# Patient Record
Sex: FEMALE | Race: BLACK OR AFRICAN AMERICAN | NOT HISPANIC OR LATINO | URBAN - METROPOLITAN AREA
[De-identification: names, ages, dates, MRNs, and addresses within clinical notes are randomized per-mention and may not be internally consistent; named-entity substitution may affect disease eponyms.]

---

## 2023-10-06 ENCOUNTER — INPATIENT (INPATIENT)
Facility: HOSPITAL | Age: 53
LOS: 11 days | Discharge: HOME | End: 2023-10-18
Attending: INTERNAL MEDICINE | Admitting: INTERNAL MEDICINE
Payer: MEDICAID

## 2023-10-06 VITALS
OXYGEN SATURATION: 69 % | DIASTOLIC BLOOD PRESSURE: 90 MMHG | HEIGHT: 59.84 IN | TEMPERATURE: 99 F | WEIGHT: 108.91 LBS | RESPIRATION RATE: 28 BRPM | SYSTOLIC BLOOD PRESSURE: 146 MMHG | HEART RATE: 127 BPM

## 2023-10-06 DIAGNOSIS — R79.89 OTHER SPECIFIED ABNORMAL FINDINGS OF BLOOD CHEMISTRY: ICD-10-CM

## 2023-10-06 DIAGNOSIS — J96.01 ACUTE RESPIRATORY FAILURE WITH HYPOXIA: ICD-10-CM

## 2023-10-06 DIAGNOSIS — J18.9 PNEUMONIA, UNSPECIFIED ORGANISM: ICD-10-CM

## 2023-10-06 DIAGNOSIS — E87.6 HYPOKALEMIA: ICD-10-CM

## 2023-10-06 LAB
ALBUMIN SERPL ELPH-MCNC: 3 G/DL — LOW (ref 3.3–5)
ALBUMIN SERPL ELPH-MCNC: 3.6 G/DL — SIGNIFICANT CHANGE UP (ref 3.3–5)
ALP SERPL-CCNC: 83 U/L — SIGNIFICANT CHANGE UP (ref 40–120)
ALP SERPL-CCNC: 97 U/L — SIGNIFICANT CHANGE UP (ref 40–120)
ALT FLD-CCNC: 47 U/L — SIGNIFICANT CHANGE UP (ref 12–78)
ALT FLD-CCNC: 47 U/L — SIGNIFICANT CHANGE UP (ref 12–78)
ANION GAP SERPL CALC-SCNC: 7 MMOL/L — SIGNIFICANT CHANGE UP (ref 5–17)
ANION GAP SERPL CALC-SCNC: 7 MMOL/L — SIGNIFICANT CHANGE UP (ref 5–17)
APPEARANCE UR: CLEAR — SIGNIFICANT CHANGE UP
APTT BLD: 30.6 SEC — SIGNIFICANT CHANGE UP (ref 24.5–35.6)
AST SERPL-CCNC: 36 U/L — SIGNIFICANT CHANGE UP (ref 15–37)
AST SERPL-CCNC: 43 U/L — HIGH (ref 15–37)
BACTERIA # UR AUTO: ABNORMAL
BASE EXCESS BLDA CALC-SCNC: -1.4 MMOL/L — SIGNIFICANT CHANGE UP (ref -2–3)
BASE EXCESS BLDA CALC-SCNC: 3.2 MMOL/L — HIGH (ref -2–3)
BASOPHILS # BLD AUTO: 0.04 K/UL — SIGNIFICANT CHANGE UP (ref 0–0.2)
BASOPHILS NFR BLD AUTO: 0.2 % — SIGNIFICANT CHANGE UP (ref 0–2)
BILIRUB SERPL-MCNC: 1.3 MG/DL — HIGH (ref 0.2–1.2)
BILIRUB SERPL-MCNC: 1.8 MG/DL — HIGH (ref 0.2–1.2)
BILIRUB UR-MCNC: NEGATIVE — SIGNIFICANT CHANGE UP
BLD GP AB SCN SERPL QL: SIGNIFICANT CHANGE UP
BLOOD GAS COMMENTS ARTERIAL: SIGNIFICANT CHANGE UP
BLOOD GAS COMMENTS ARTERIAL: SIGNIFICANT CHANGE UP
BUN SERPL-MCNC: 6 MG/DL — LOW (ref 7–23)
BUN SERPL-MCNC: 9 MG/DL — SIGNIFICANT CHANGE UP (ref 7–23)
CALCIUM SERPL-MCNC: 8.7 MG/DL — SIGNIFICANT CHANGE UP (ref 8.5–10.1)
CALCIUM SERPL-MCNC: 8.9 MG/DL — SIGNIFICANT CHANGE UP (ref 8.5–10.1)
CHLORIDE SERPL-SCNC: 103 MMOL/L — SIGNIFICANT CHANGE UP (ref 96–108)
CHLORIDE SERPL-SCNC: 107 MMOL/L — SIGNIFICANT CHANGE UP (ref 96–108)
CK MB BLD-MCNC: 2.1 % — SIGNIFICANT CHANGE UP (ref 0–3.5)
CK MB CFR SERPL CALC: 3.8 NG/ML — HIGH (ref 0.5–3.6)
CK SERPL-CCNC: 177 U/L — SIGNIFICANT CHANGE UP (ref 26–192)
CO2 BLDA-SCNC: 28 MMOL/L — HIGH (ref 19–24)
CO2 BLDA-SCNC: 29 MMOL/L — HIGH (ref 19–24)
CO2 SERPL-SCNC: 24 MMOL/L — SIGNIFICANT CHANGE UP (ref 22–31)
CO2 SERPL-SCNC: 28 MMOL/L — SIGNIFICANT CHANGE UP (ref 22–31)
COLOR SPEC: YELLOW — SIGNIFICANT CHANGE UP
CREAT SERPL-MCNC: 0.69 MG/DL — SIGNIFICANT CHANGE UP (ref 0.5–1.3)
CREAT SERPL-MCNC: 0.85 MG/DL — SIGNIFICANT CHANGE UP (ref 0.5–1.3)
D DIMER BLD IA.RAPID-MCNC: 282 NG/ML DDU — HIGH
DIFF PNL FLD: ABNORMAL
EGFR: 103 ML/MIN/1.73M2 — SIGNIFICANT CHANGE UP
EGFR: 81 ML/MIN/1.73M2 — SIGNIFICANT CHANGE UP
EOSINOPHIL # BLD AUTO: 0 K/UL — SIGNIFICANT CHANGE UP (ref 0–0.5)
EOSINOPHIL NFR BLD AUTO: 0 % — SIGNIFICANT CHANGE UP (ref 0–6)
EPI CELLS # UR: SIGNIFICANT CHANGE UP
GAS PNL BLDA: SIGNIFICANT CHANGE UP
GLUCOSE BLDC GLUCOMTR-MCNC: 179 MG/DL — HIGH (ref 70–99)
GLUCOSE SERPL-MCNC: 144 MG/DL — HIGH (ref 70–99)
GLUCOSE SERPL-MCNC: 170 MG/DL — HIGH (ref 70–99)
GLUCOSE UR QL: 250 MG/DL
HCO3 BLDA-SCNC: 27 MMOL/L — SIGNIFICANT CHANGE UP (ref 21–28)
HCO3 BLDA-SCNC: 28 MMOL/L — SIGNIFICANT CHANGE UP (ref 21–28)
HCT VFR BLD CALC: 36.3 % — SIGNIFICANT CHANGE UP (ref 34.5–45)
HGB BLD-MCNC: 11.5 G/DL — SIGNIFICANT CHANGE UP (ref 11.5–15.5)
HOROWITZ INDEX BLDA+IHG-RTO: 100 — SIGNIFICANT CHANGE UP
HOROWITZ INDEX BLDA+IHG-RTO: 100 — SIGNIFICANT CHANGE UP
IMM GRANULOCYTES NFR BLD AUTO: 0.4 % — SIGNIFICANT CHANGE UP (ref 0–0.9)
INR BLD: 1.08 RATIO — SIGNIFICANT CHANGE UP (ref 0.85–1.18)
KETONES UR-MCNC: NEGATIVE — SIGNIFICANT CHANGE UP
LACTATE SERPL-SCNC: 3 MMOL/L — HIGH (ref 0.7–2)
LACTATE SERPL-SCNC: 5.9 MMOL/L — CRITICAL HIGH (ref 0.7–2)
LACTATE SERPL-SCNC: 6.8 MMOL/L — CRITICAL HIGH (ref 0.7–2)
LEGIONELLA AG UR QL: NEGATIVE — SIGNIFICANT CHANGE UP
LEUKOCYTE ESTERASE UR-ACNC: NEGATIVE — SIGNIFICANT CHANGE UP
LYMPHOCYTES # BLD AUTO: 1.5 K/UL — SIGNIFICANT CHANGE UP (ref 1–3.3)
LYMPHOCYTES # BLD AUTO: 8.9 % — LOW (ref 13–44)
MAGNESIUM SERPL-MCNC: 1.7 MG/DL — SIGNIFICANT CHANGE UP (ref 1.6–2.6)
MCHC RBC-ENTMCNC: 27.8 PG — SIGNIFICANT CHANGE UP (ref 27–34)
MCHC RBC-ENTMCNC: 31.7 G/DL — LOW (ref 32–36)
MCV RBC AUTO: 87.9 FL — SIGNIFICANT CHANGE UP (ref 80–100)
MONOCYTES # BLD AUTO: 0.49 K/UL — SIGNIFICANT CHANGE UP (ref 0–0.9)
MONOCYTES NFR BLD AUTO: 2.9 % — SIGNIFICANT CHANGE UP (ref 2–14)
NEUTROPHILS # BLD AUTO: 14.72 K/UL — HIGH (ref 1.8–7.4)
NEUTROPHILS NFR BLD AUTO: 87.6 % — HIGH (ref 43–77)
NITRITE UR-MCNC: NEGATIVE — SIGNIFICANT CHANGE UP
NRBC # BLD: 0 /100 WBCS — SIGNIFICANT CHANGE UP (ref 0–0)
PCO2 BLDA: 42 MMHG — SIGNIFICANT CHANGE UP (ref 32–46)
PCO2 BLDA: 58 MMHG — HIGH (ref 32–46)
PH BLDA: 7.27 — LOW (ref 7.35–7.45)
PH BLDA: 7.43 — SIGNIFICANT CHANGE UP (ref 7.35–7.45)
PH UR: 6 — SIGNIFICANT CHANGE UP (ref 5–8)
PHOSPHATE SERPL-MCNC: 3.2 MG/DL — SIGNIFICANT CHANGE UP (ref 2.5–4.5)
PLATELET # BLD AUTO: 218 K/UL — SIGNIFICANT CHANGE UP (ref 150–400)
PO2 BLDA: 76 MMHG — LOW (ref 83–108)
PO2 BLDA: 85 MMHG — SIGNIFICANT CHANGE UP (ref 83–108)
POTASSIUM SERPL-MCNC: 3.4 MMOL/L — LOW (ref 3.5–5.3)
POTASSIUM SERPL-MCNC: 3.5 MMOL/L — SIGNIFICANT CHANGE UP (ref 3.5–5.3)
POTASSIUM SERPL-SCNC: 3.4 MMOL/L — LOW (ref 3.5–5.3)
POTASSIUM SERPL-SCNC: 3.5 MMOL/L — SIGNIFICANT CHANGE UP (ref 3.5–5.3)
PROT SERPL-MCNC: 7.2 GM/DL — SIGNIFICANT CHANGE UP (ref 6–8.3)
PROT SERPL-MCNC: 7.9 GM/DL — SIGNIFICANT CHANGE UP (ref 6–8.3)
PROT UR-MCNC: 15 MG/DL
PROTHROM AB SERPL-ACNC: 12.9 SEC — SIGNIFICANT CHANGE UP (ref 9.5–13)
RAPID RVP RESULT: SIGNIFICANT CHANGE UP
RBC # BLD: 4.13 M/UL — SIGNIFICANT CHANGE UP (ref 3.8–5.2)
RBC # FLD: 13.5 % — SIGNIFICANT CHANGE UP (ref 10.3–14.5)
RBC CASTS # UR COMP ASSIST: SIGNIFICANT CHANGE UP /HPF (ref 0–4)
S PNEUM AG UR QL: NEGATIVE — SIGNIFICANT CHANGE UP
SAO2 % BLDA: 95.3 % — SIGNIFICANT CHANGE UP (ref 94–98)
SAO2 % BLDA: 97.8 % — SIGNIFICANT CHANGE UP (ref 94–98)
SARS-COV-2 RNA SPEC QL NAA+PROBE: SIGNIFICANT CHANGE UP
SODIUM SERPL-SCNC: 138 MMOL/L — SIGNIFICANT CHANGE UP (ref 135–145)
SODIUM SERPL-SCNC: 138 MMOL/L — SIGNIFICANT CHANGE UP (ref 135–145)
SP GR SPEC: 1.01 — SIGNIFICANT CHANGE UP (ref 1.01–1.02)
TROPONIN I, HIGH SENSITIVITY RESULT: 107.4 NG/L — HIGH
TROPONIN I, HIGH SENSITIVITY RESULT: 278.6 NG/L — HIGH
UROBILINOGEN FLD QL: NEGATIVE MG/DL — SIGNIFICANT CHANGE UP
WBC # BLD: 16.81 K/UL — HIGH (ref 3.8–10.5)
WBC # FLD AUTO: 16.81 K/UL — HIGH (ref 3.8–10.5)
WBC UR QL: SIGNIFICANT CHANGE UP

## 2023-10-06 PROCEDURE — 99223 1ST HOSP IP/OBS HIGH 75: CPT

## 2023-10-06 PROCEDURE — 31500 INSERT EMERGENCY AIRWAY: CPT

## 2023-10-06 PROCEDURE — 99053 MED SERV 10PM-8AM 24 HR FAC: CPT

## 2023-10-06 PROCEDURE — 71045 X-RAY EXAM CHEST 1 VIEW: CPT | Mod: 26,77

## 2023-10-06 PROCEDURE — 99291 CRITICAL CARE FIRST HOUR: CPT

## 2023-10-06 PROCEDURE — 71045 X-RAY EXAM CHEST 1 VIEW: CPT | Mod: 26

## 2023-10-06 PROCEDURE — 43752 NASAL/OROGASTRIC W/TUBE PLMT: CPT

## 2023-10-06 PROCEDURE — 71275 CT ANGIOGRAPHY CHEST: CPT | Mod: 26,MA

## 2023-10-06 PROCEDURE — 99291 CRITICAL CARE FIRST HOUR: CPT | Mod: 25

## 2023-10-06 RX ORDER — MAGNESIUM SULFATE 500 MG/ML
2 VIAL (ML) INJECTION ONCE
Refills: 0 | Status: COMPLETED | OUTPATIENT
Start: 2023-10-06 | End: 2023-10-06

## 2023-10-06 RX ORDER — NOREPINEPHRINE BITARTRATE/D5W 8 MG/250ML
0.05 PLASTIC BAG, INJECTION (ML) INTRAVENOUS
Qty: 8 | Refills: 0 | Status: DISCONTINUED | OUTPATIENT
Start: 2023-10-06 | End: 2023-10-09

## 2023-10-06 RX ORDER — MIDAZOLAM HYDROCHLORIDE 1 MG/ML
4 INJECTION, SOLUTION INTRAMUSCULAR; INTRAVENOUS ONCE
Refills: 0 | Status: DISCONTINUED | OUTPATIENT
Start: 2023-10-06 | End: 2023-10-06

## 2023-10-06 RX ORDER — PANTOPRAZOLE SODIUM 20 MG/1
40 TABLET, DELAYED RELEASE ORAL DAILY
Refills: 0 | Status: DISCONTINUED | OUTPATIENT
Start: 2023-10-06 | End: 2023-10-10

## 2023-10-06 RX ORDER — IPRATROPIUM/ALBUTEROL SULFATE 18-103MCG
3 AEROSOL WITH ADAPTER (GRAM) INHALATION
Refills: 0 | Status: COMPLETED | OUTPATIENT
Start: 2023-10-06 | End: 2023-10-06

## 2023-10-06 RX ORDER — ONDANSETRON 8 MG/1
4 TABLET, FILM COATED ORAL EVERY 8 HOURS
Refills: 0 | Status: DISCONTINUED | OUTPATIENT
Start: 2023-10-06 | End: 2023-10-18

## 2023-10-06 RX ORDER — CISATRACURIUM BESYLATE 2 MG/ML
3 INJECTION INTRAVENOUS
Qty: 200 | Refills: 0 | Status: DISCONTINUED | OUTPATIENT
Start: 2023-10-06 | End: 2023-10-08

## 2023-10-06 RX ORDER — CEFTRIAXONE 500 MG/1
1000 INJECTION, POWDER, FOR SOLUTION INTRAMUSCULAR; INTRAVENOUS ONCE
Refills: 0 | Status: COMPLETED | OUTPATIENT
Start: 2023-10-06 | End: 2023-10-06

## 2023-10-06 RX ORDER — SODIUM CHLORIDE 9 MG/ML
1000 INJECTION, SOLUTION INTRAVENOUS
Refills: 0 | Status: COMPLETED | OUTPATIENT
Start: 2023-10-06 | End: 2023-10-06

## 2023-10-06 RX ORDER — IPRATROPIUM/ALBUTEROL SULFATE 18-103MCG
3 AEROSOL WITH ADAPTER (GRAM) INHALATION ONCE
Refills: 0 | Status: COMPLETED | OUTPATIENT
Start: 2023-10-06 | End: 2023-10-06

## 2023-10-06 RX ORDER — SODIUM CHLORIDE 9 MG/ML
1550 INJECTION INTRAMUSCULAR; INTRAVENOUS; SUBCUTANEOUS ONCE
Refills: 0 | Status: COMPLETED | OUTPATIENT
Start: 2023-10-06 | End: 2023-10-06

## 2023-10-06 RX ORDER — CHLORHEXIDINE GLUCONATE 213 G/1000ML
1 SOLUTION TOPICAL
Refills: 0 | Status: DISCONTINUED | OUTPATIENT
Start: 2023-10-06 | End: 2023-10-18

## 2023-10-06 RX ORDER — DEXAMETHASONE 0.5 MG/5ML
10 ELIXIR ORAL DAILY
Refills: 0 | Status: DISCONTINUED | OUTPATIENT
Start: 2023-10-06 | End: 2023-10-09

## 2023-10-06 RX ORDER — IPRATROPIUM/ALBUTEROL SULFATE 18-103MCG
3 AEROSOL WITH ADAPTER (GRAM) INHALATION EVERY 6 HOURS
Refills: 0 | Status: DISCONTINUED | OUTPATIENT
Start: 2023-10-06 | End: 2023-10-08

## 2023-10-06 RX ORDER — HEPARIN SODIUM 5000 [USP'U]/ML
5000 INJECTION INTRAVENOUS; SUBCUTANEOUS EVERY 8 HOURS
Refills: 0 | Status: DISCONTINUED | OUTPATIENT
Start: 2023-10-06 | End: 2023-10-11

## 2023-10-06 RX ORDER — FENTANYL CITRATE 50 UG/ML
0.5 INJECTION INTRAVENOUS
Qty: 2500 | Refills: 0 | Status: DISCONTINUED | OUTPATIENT
Start: 2023-10-06 | End: 2023-10-09

## 2023-10-06 RX ORDER — PIPERACILLIN AND TAZOBACTAM 4; .5 G/20ML; G/20ML
3.38 INJECTION, POWDER, LYOPHILIZED, FOR SOLUTION INTRAVENOUS ONCE
Refills: 0 | Status: COMPLETED | OUTPATIENT
Start: 2023-10-06 | End: 2023-10-06

## 2023-10-06 RX ORDER — SODIUM CHLORIDE 9 MG/ML
1000 INJECTION, SOLUTION INTRAVENOUS
Refills: 0 | Status: DISCONTINUED | OUTPATIENT
Start: 2023-10-06 | End: 2023-10-06

## 2023-10-06 RX ORDER — INSULIN LISPRO 100/ML
VIAL (ML) SUBCUTANEOUS EVERY 6 HOURS
Refills: 0 | Status: DISCONTINUED | OUTPATIENT
Start: 2023-10-06 | End: 2023-10-10

## 2023-10-06 RX ORDER — ACETAMINOPHEN 500 MG
650 TABLET ORAL EVERY 6 HOURS
Refills: 0 | Status: DISCONTINUED | OUTPATIENT
Start: 2023-10-06 | End: 2023-10-18

## 2023-10-06 RX ORDER — AZITHROMYCIN 500 MG/1
500 TABLET, FILM COATED ORAL EVERY 24 HOURS
Refills: 0 | Status: COMPLETED | OUTPATIENT
Start: 2023-10-07 | End: 2023-10-08

## 2023-10-06 RX ORDER — CHLORHEXIDINE GLUCONATE 213 G/1000ML
15 SOLUTION TOPICAL EVERY 12 HOURS
Refills: 0 | Status: DISCONTINUED | OUTPATIENT
Start: 2023-10-06 | End: 2023-10-09

## 2023-10-06 RX ORDER — ACETAMINOPHEN 500 MG
975 TABLET ORAL ONCE
Refills: 0 | Status: COMPLETED | OUTPATIENT
Start: 2023-10-06 | End: 2023-10-06

## 2023-10-06 RX ORDER — FENTANYL CITRATE 50 UG/ML
50 INJECTION INTRAVENOUS ONCE
Refills: 0 | Status: DISCONTINUED | OUTPATIENT
Start: 2023-10-06 | End: 2023-10-06

## 2023-10-06 RX ORDER — AZITHROMYCIN 500 MG/1
500 TABLET, FILM COATED ORAL ONCE
Refills: 0 | Status: COMPLETED | OUTPATIENT
Start: 2023-10-06 | End: 2023-10-06

## 2023-10-06 RX ORDER — PROPOFOL 10 MG/ML
20 INJECTION, EMULSION INTRAVENOUS
Qty: 1000 | Refills: 0 | Status: DISCONTINUED | OUTPATIENT
Start: 2023-10-06 | End: 2023-10-09

## 2023-10-06 RX ORDER — PIPERACILLIN AND TAZOBACTAM 4; .5 G/20ML; G/20ML
3.38 INJECTION, POWDER, LYOPHILIZED, FOR SOLUTION INTRAVENOUS EVERY 8 HOURS
Refills: 0 | Status: COMPLETED | OUTPATIENT
Start: 2023-10-07 | End: 2023-10-13

## 2023-10-06 RX ORDER — POTASSIUM CHLORIDE 20 MEQ
20 PACKET (EA) ORAL
Refills: 0 | Status: COMPLETED | OUTPATIENT
Start: 2023-10-06 | End: 2023-10-06

## 2023-10-06 RX ORDER — LANOLIN ALCOHOL/MO/W.PET/CERES
3 CREAM (GRAM) TOPICAL AT BEDTIME
Refills: 0 | Status: DISCONTINUED | OUTPATIENT
Start: 2023-10-06 | End: 2023-10-07

## 2023-10-06 RX ORDER — DEXAMETHASONE 0.5 MG/5ML
20 ELIXIR ORAL DAILY
Refills: 0 | Status: DISCONTINUED | OUTPATIENT
Start: 2023-10-06 | End: 2023-10-06

## 2023-10-06 RX ADMIN — Medication 3 MILLILITER(S): at 15:36

## 2023-10-06 RX ADMIN — PIPERACILLIN AND TAZOBACTAM 200 GRAM(S): 4; .5 INJECTION, POWDER, LYOPHILIZED, FOR SOLUTION INTRAVENOUS at 16:57

## 2023-10-06 RX ADMIN — Medication 20 MILLIEQUIVALENT(S): at 13:25

## 2023-10-06 RX ADMIN — HEPARIN SODIUM 5000 UNIT(S): 5000 INJECTION INTRAVENOUS; SUBCUTANEOUS at 22:47

## 2023-10-06 RX ADMIN — PROPOFOL 5.93 MICROGRAM(S)/KG/MIN: 10 INJECTION, EMULSION INTRAVENOUS at 15:54

## 2023-10-06 RX ADMIN — Medication 25 GRAM(S): at 22:47

## 2023-10-06 RX ADMIN — MIDAZOLAM HYDROCHLORIDE 4 MILLIGRAM(S): 1 INJECTION, SOLUTION INTRAMUSCULAR; INTRAVENOUS at 15:25

## 2023-10-06 RX ADMIN — Medication 3 MILLILITER(S): at 15:13

## 2023-10-06 RX ADMIN — Medication 3 MILLILITER(S): at 13:07

## 2023-10-06 RX ADMIN — AZITHROMYCIN 255 MILLIGRAM(S): 500 TABLET, FILM COATED ORAL at 06:09

## 2023-10-06 RX ADMIN — Medication 4.63 MICROGRAM(S)/KG/MIN: at 15:55

## 2023-10-06 RX ADMIN — FENTANYL CITRATE 2.47 MICROGRAM(S)/KG/HR: 50 INJECTION INTRAVENOUS at 19:59

## 2023-10-06 RX ADMIN — FENTANYL CITRATE 50 MICROGRAM(S): 50 INJECTION INTRAVENOUS at 15:35

## 2023-10-06 RX ADMIN — Medication 1: at 18:37

## 2023-10-06 RX ADMIN — Medication 3 MILLILITER(S): at 13:48

## 2023-10-06 RX ADMIN — Medication 3 MILLILITER(S): at 05:17

## 2023-10-06 RX ADMIN — SODIUM CHLORIDE 100 MILLILITER(S): 9 INJECTION, SOLUTION INTRAVENOUS at 18:25

## 2023-10-06 RX ADMIN — Medication 3 MILLILITER(S): at 05:28

## 2023-10-06 RX ADMIN — PIPERACILLIN AND TAZOBACTAM 25 GRAM(S): 4; .5 INJECTION, POWDER, LYOPHILIZED, FOR SOLUTION INTRAVENOUS at 22:46

## 2023-10-06 RX ADMIN — SODIUM CHLORIDE 1000 MILLILITER(S): 9 INJECTION, SOLUTION INTRAVENOUS at 13:04

## 2023-10-06 RX ADMIN — Medication 975 MILLIGRAM(S): at 05:13

## 2023-10-06 RX ADMIN — Medication 3 MILLILITER(S): at 05:43

## 2023-10-06 RX ADMIN — CEFTRIAXONE 100 MILLIGRAM(S): 500 INJECTION, POWDER, FOR SOLUTION INTRAMUSCULAR; INTRAVENOUS at 05:17

## 2023-10-06 RX ADMIN — CISATRACURIUM BESYLATE 8.89 MICROGRAM(S)/KG/MIN: 2 INJECTION INTRAVENOUS at 23:04

## 2023-10-06 RX ADMIN — SODIUM CHLORIDE 1550 MILLILITER(S): 9 INJECTION INTRAMUSCULAR; INTRAVENOUS; SUBCUTANEOUS at 05:17

## 2023-10-06 RX ADMIN — Medication 40 MILLIGRAM(S): at 14:34

## 2023-10-06 RX ADMIN — PROPOFOL 5.93 MICROGRAM(S)/KG/MIN: 10 INJECTION, EMULSION INTRAVENOUS at 22:51

## 2023-10-06 NOTE — H&P ADULT - ASSESSMENT
53 years old female with no significant PMH present to ED with complain of worsening SOB for 1 day. Patietn reported cough for 3-4 days, fever for 1 day and worsening SOB for 1 day. No nausea, vomiting or diarrhea. No known sick exposure.  Hypoxic to 69 % at RA, require nonrebreather, tachycardic. WBC 16.81, plt 218, ddimer 282, K 3.4, Cr 0.85, lactate 3, hsTnT 107.4, proBNP 344. CTA chest with no PE. Bilateral lung opacities. Bronchiectasis.

## 2023-10-06 NOTE — ED ADULT NURSE NOTE - CHIEF COMPLAINT QUOTE
complaining of "fever" started at 3am "I start breathing hard" around 9 pm. able to speak in full sentence. denies Pmhx

## 2023-10-06 NOTE — ED ADULT TRIAGE NOTE - CHIEF COMPLAINT QUOTE
complaining of "fever" started at 3am "I start breathing hard" around 9 pm. complaining of "fever" started at 3am "I start breathing hard" around 9 pm. able to speak in full sentence. denies Pmhx

## 2023-10-06 NOTE — ED ADULT NURSE REASSESSMENT NOTE - NS ED NURSE REASSESS COMMENT FT1
Patient awake and alert with O2 saturation of 77% on HFNC. Patient placed backed on non rebreather with immediate increase in Oxygen Saturation to 99%. Patient speaking in full sentences, states she feels more comfortable on non rebreather, MD paged to notify.
14:22:   RRT called. Patient appears air hungry, despite non rebreather. B/L wheeze audible. Albuterol/Atrovent administered along with solumedrol as per critical care response team. Bipap trailed, however patient becoming fatigued. Patient intubated 1t 15:02 with Etomidate 20mg, Versed 4mg, and Rocuronium 100mg, with 7.0 ET tube and 22 @ L lip. Propofol up and infusing for post intubation sedation. CXR done,  at bedside speaking to critical care.
Patient received on stretcher s/p CTA. Patient awake and alert. 100% non re breather tolerated, with satuartion od 100%. HR STach at 130. Comfort measures provided.  at bedside. Updated on POC.

## 2023-10-06 NOTE — H&P ADULT - PROBLEM SELECTOR PLAN 3
no chest pain  elevated trop likely due to hypoxia, multifocal pneumonia  serial trop/CK/CKMB  ECHO, telemetry monitoring  lipid panel, A1c

## 2023-10-06 NOTE — RAPID RESPONSE TEAM SUMMARY - NSSITUATIONBACKGROUNDRRT_GEN_ALL_CORE
53F no PMH presents for SOB, fever, dry cough. RRT called for hypoxia, respiratory distress, tachypnea on NRB. Desaturating on high flow O2 and placed on NRB. Desatting on NRB to the 70s. RR 40s with accessory muscle use.

## 2023-10-06 NOTE — ED PROVIDER NOTE - OBJECTIVE STATEMENT
54 yo F with sob, fever, malaise, starting this afternoon while pt. at work.  Pt. took Tylenol with relief, but about 2 am pt. started to have increased work of breathing, feeling like she can't catch her breath.  No associated cp. 52 yo F with sob, fever, malaise, generally weak, starting this afternoon while pt. at work.  Pt. took Tylenol with relief, but about 2 am pt. started to have increased work of breathing, feeling like she can't catch her breath.  No associated cp.  Pt. denies inciting event.  She was well before this.  Never had lung/breathing problems before.   ROS: negative for cough, headache, chest pain, abd pain, nausea, vomiting, diarrhea, rash, paresthesia, and focal weakness--all other systems reviewed are negative.   PMH: Denies; Meds: Denies; SH: Denies smoking/drinking/drug use

## 2023-10-06 NOTE — CONSULT NOTE ADULT - SUBJECTIVE AND OBJECTIVE BOX
CHIEF COMPLAINT: Fever, SOB    HPI: 53 year old female denies PHX C/O fever (102 TMAX), fatigue and SOB worsening x1 day. Denies Cough, HA, CP, ABD pain, N/v/D, dysuria, known sick contacts, recent travel.           PAST MEDICAL & SURGICAL HISTORY:  No pertinent past medical history      No significant past surgical history          FAMILY HISTORY:      SOCIAL HISTORY:  Smoking: [X] Never Smoked [ ] Former Smoker (__ packs x ___ years) [ ] Current Smoker  (__ packs x ___ years)  Substance Use: [X] Never Used [ ] Used ____  EtOH Use: Denies  Marital Status: [ ] Single [ ]  [ ]  [ ]   Sexual History:   Occupation:    Recent Travel: Denies   Country of Birth: [facility] (arrived to  1991)  Advance Directives:    Allergies    No Known Allergies    Intolerances        HOME MEDICATIONS:    REVIEW OF SYSTEMS:  Constitutional: [ ] negative [X ] fevers [X ] chills [ ] weight loss [ ] weight gain  HEENT: [ X] negative [ ] dry eyes [ ] eye irritation [ ] postnasal drip [ ] nasal congestion  CV: [ X] negative  [ ] chest pain [ ] orthopnea [ ] palpitations [ ] murmur  Resp: [ ] negative [ ] cough [ X] shortness of breath [ X] dyspnea [ ] wheezing [ ] sputum [ ] hemoptysis  GI: [X] negative [ ] nausea [ ] vomiting [ ] diarrhea [ ] constipation [ ] abd pain [ ] dysphagia   : [X ] negative [ ] dysuria [ ] nocturia [ ] hematuria [ ] increased urinary frequency  Musculoskeletal: [X ] negative [ ] back pain [ ] myalgias [ ] arthralgias [ ] fracture  Skin: [ X] negative [ ] rash [ ] itch  Neurological: [X ] negative [ ] headache [ ] dizziness [ ] syncope [ ] weakness [ ] numbness  Psychiatric: [ X] negative [ ] anxiety [ ] depression  Endocrine: [ X] negative [ ] diabetes [ ] thyroid problem  Hematologic/Lymphatic: [X ] negative [ ] anemia [ ] bleeding problem  Allergic/Immunologic: [ X] negative [ ] itchy eyes [ ] nasal discharge [ ] hives [ ] angioedema  [ ] All other systems negative  [ ] Unable to assess ROS because ________    OBJECTIVE:   Vital Signs Last 24 Hrs  T(C): 37.2 (06 Oct 2023 07:23), Max: 37.8 (06 Oct 2023 05:12)  T(F): 98.9 (06 Oct 2023 07:23), Max: 100 (06 Oct 2023 05:12)  HR: 133 (06 Oct 2023 07:23) (114 - 147)  BP: 147/92 (06 Oct 2023 07:23) (141/101 - 198/118)  BP(mean): --  ABP: --  ABP(mean): --  RR: 32 (06 Oct 2023 07:23) (21 - 45)  SpO2: 100% (06 Oct 2023 07:23) (69% - 100%)    O2 Parameters below as of 06 Oct 2023 07:23  Patient On (Oxygen Delivery Method): mask, nonrebreather              CAPILLARY BLOOD GLUCOSE          PHYSICAL EXAM:  General: Sitting upright in stretcher on non- rebreather, appears dyspneic  HEENT: NC/AT, PERRL, MMM  Neck: supple, No JVD, trachea midline  Respiratory: Diffuse Rhonchi B/L, No wheeze, tachypneic  Cardiovascular: Tachycardic rate, reg rhythm, no m/r/g  Abdomen: Soft, NTTP, ND, + BS  Extremities: No swelling, erythema, cyanosis or clubbing  Skin: no rashes, lesions  Neurological: A&Ox3, non focal  Psychiatry: appropriate mood and affect     HOSPITAL MEDICATIONS:            acetaminophen     Tablet .. 650 milliGRAM(s) Oral every 6 hours PRN  melatonin 3 milliGRAM(s) Oral at bedtime PRN  ondansetron Injectable 4 milliGRAM(s) IV Push every 8 hours PRN    aluminum hydroxide/magnesium hydroxide/simethicone Suspension 30 milliLiter(s) Oral every 4 hours PRN        potassium chloride    Tablet ER 20 milliEquivalent(s) Oral every 2 hours            LABS:                        11.5   16.81 )-----------( 218      ( 06 Oct 2023 05:30 )             36.3     Hgb Trend: 11.5<--  10-06    138  |  103  |  9   ----------------------------<  144<H>  3.4<L>   |  28  |  0.85    Ca    8.9      06 Oct 2023 05:30    TPro  7.9  /  Alb  3.6  /  TBili  1.8<H>  /  DBili  x   /  AST  36  /  ALT  47  /  AlkPhos  97  10-06    Creatinine Trend: 0.85<--  PT/INR - ( 06 Oct 2023 05:30 )   PT: 12.9 sec;   INR: 1.08 ratio         PTT - ( 06 Oct 2023 05:30 )  PTT:30.6 sec  Urinalysis Basic - ( 06 Oct 2023 05:30 )    Color: x / Appearance: x / SG: x / pH: x  Gluc: 144 mg/dL / Ketone: x  / Bili: x / Urobili: x   Blood: x / Protein: x / Nitrite: x   Leuk Esterase: x / RBC: x / WBC x   Sq Epi: x / Non Sq Epi: x / Bacteria: x      Arterial Blood Gas:  10-06 @ 05:13  7.43/42/85/28/97.8/3.2  ABG lactate: --            MICROBIOLOGY:     RVP pending    Cultures pending       RADIOLOGY:    < from: CT Angio Chest PE Protocol w/ IV Cont (10.06.23 @ 07:02) >  FINDINGS:    LUNGS AND AIRWAYS: Patent central airways.  Bilateral groundglass and   consolidative opacities. Bronchiectasis.  PLEURA: No pleural effusion or pneumothorax.  MEDIASTINUM AND EMILIA: No lymphadenopathy.  VESSELS: No thoracic aortic aneurysm or dissection. No pulmonary arterial   filling defects identified.  HEART: Heart size is normal. No pericardial effusion.  CHEST WALL AND LOWER NECK: Within normal limits.  VISUALIZED UPPER ABDOMEN: Reflux of intravenous contrast into the IVC and   hepatic veins.  BONES: Within normal limits.    IMPRESSION:  Negative for pulmonary embolism.  Bilateral lung opacities, which may represent multifocal pneumonia or   pulmonary edema.      --- End of Report ---    < end of copied text >

## 2023-10-06 NOTE — CONSULT NOTE ADULT - NS ATTEND AMEND GEN_ALL_CORE FT
53F w/ no prior medical hx. Presents w/ SOB and fever x1 day. Patient states that prior she was in usual state of health. She works as a  so she does have some exposure to chemicals and different people but no known sick contacts. Of note, she had possible dx of RA several years ago when she was having joint pain for which she received steroids and then it resolved and since then she has had no issues. She denies joint pain and morning stiffness. No weight loss, recent travel. She has no limitation on her ADLs and no limitation on her physical activities. She was born in [facility] and immigrated in [place]. Imaging reveals multifocal pneumonia w/ bronchiectasis. pt was hypoxic and tachypneic in the ED, initially evaluated by ICU team and felt not to require ICU level of care. On exam, she is tachypneic but able to speak in full sentences. No join pain or swelling on exam, no skin changes.     - acute hypoxemic respiratory failure in setting of pneumonia and bronchiectasis exacerbation  - transitioned to HFNC 53F w/ no prior medical hx. Presents w/ SOB and fever x1 day. Patient states that prior she was in usual state of health. She works as a  so she does have some exposure to chemicals and different people but no known sick contacts. Of note, she had possible dx of RA several years ago when she was having joint pain for which she received steroids and then it resolved and since then she has had no issues. She denies joint pain and morning stiffness. No weight loss, recent travel. She has no limitation on her ADLs and no limitation on her physical activities. She was born in [facility] and immigrated in [place]. Imaging reveals multifocal pneumonia w/ bronchiectasis. pt was hypoxic and tachypneic in the ED, initially evaluated by ICU team and felt not to require ICU level of care. On exam, she is tachypneic but able to speak in full sentences. No join pain or swelling on exam, no skin changes.     - acute hypoxemic respiratory failure in setting of pneumonia and bronchiectasis exacerbation  - transitioned to HFNC, satting well on 60%, 40L; maintain sats > 90-92%  - suggest switching to levofloxacin for CAP and pseudomonal coverage given her bronchiectasis  - start duonebs and aerobika for airway clearance  - get sputum cx  - would perform workup for bronchiectasis and for possible hx of RA - check LORA, RF, anti-CCP ab, quanterferon, alpha1-antitrypsin, ESR, CRP  - will need oupt pulmonary follow-up upon d/c  - will continue to follow

## 2023-10-06 NOTE — H&P ADULT - HISTORY OF PRESENT ILLNESS
53 years old female with no significant PMH present to ED with complain of worsening SOB for 1 day. Patietn reported cough for 3-4 days, fever for 1 day and worsening SOB for 1 day. No nausea, vomiting or diarrhea. No known sick exposure.  Hypoxic to 69 % at RA, require nonrebreather, tachycardic. WBC 16.81, plt 218, ddimer 282, K 3.4, Cr 0.85, lactate 3, hsTnT 107.4, proBNP 344. CTA chest with no PE. Bilateral lung opacities. Bronchiectasis.     SH: no toxic habits  FH: HTN

## 2023-10-06 NOTE — CONSULT NOTE ADULT - ASSESSMENT
53 y.o F with no known PMHX presented to the ED with worsening SOB x1 day. Pt reported cough for 3-4 days associated with fever and malaise for one day. Pt states at approx. 2 am she developed increased work of breathing with no associated chest pain.  Pt denied N/V/D. No reported sick exposure. Denies history of smoking. Upon arrival to the ED pt was hypoxic to 69% on RA, required a nonrebreather associated with tachycardia in the 130s. Leukocytosis noted, elevated d-dimer 282, lactate 3, and proBNP 344. Sepsis workup was initiated, pending results. CTA of chest revealed no PE however revealed multifocal pneumonia. ICU was consulted for tachycardia. Pt at this time does not require ICU admission, pt is hemodynamically stable for medical floor.     -      53 y.o F with no known PMHX presented to the ED with worsening SOB x1 day. Pt reported cough for 3-4 days associated with fever and malaise for one day. Pt states at approx. 2 am she developed increased work of breathing with no associated chest pain.  Pt denied N/V/D. No reported sick exposure. Denies history of smoking. Upon arrival to the ED pt was hypoxic to 69% on RA, required a nonrebreather associated with tachycardia in the 130s. Leukocytosis noted, elevated d-dimer 282, lactate 3, and proBNP 344. Sepsis workup was initiated, pending results. CTA of chest revealed no PE however revealed multifocal pneumonia. ICU was consulted for tachycardia. At this time there is no indication for ICU admission, pts SaO2 is 98% on a nonrebreather and is hemodynamically stable for medical floor.     - Consult pulmonary for further management, consider adding Duonebs for wheezing  - Continue to wean off FiO2 as tolerated consider transition of high flow, while maintaining a SaO2 of >92%  - Recommend antibiotics for multifocal pneumonia for CAP coverage, F/U cultures     Continue aggressive management as per primary team

## 2023-10-06 NOTE — CONSULT NOTE ADULT - SUBJECTIVE AND OBJECTIVE BOX
CHIEF COMPLAINT:    HPI:  53 y.o F with no known PMHX presented to the ED with worsening SOB x1 day. Pt reported cough for 3-4 days associated with fever and malaise for one day. Pt states at approx. 2 am she developed increased work of breathing with no associated chest pain.  Pt denied N/V/D. No reported sick exposure. Denies history of smoking. Upon arrival to the ED pt was hypoxic to 69% on RA, required a nonrebreather associated with tachycardia in the 130s. Leukocytosis noted, elevated d-dimer 282, lactate 3, and proBNP 344. Sepsis workup was initiated, pending results. CTA of chest revealed no PE however revealed multifocal pneumonia. ICU was consulted for tachycardia.    At bedside patient was on a nonrebreather at 10L with a SaO2 OF 98%. Pt does not have increased work of breathing, was sitting upright in bed comfortably speaking in full sentences with no difficulties. At this time patient is hemodynamically stable for the floor.       PAST MEDICAL  No pertinent past medical history    SOCIAL HISTORY:  Smoking: denies  EtOH Use: denies  Marital Status: ,  at bedside     Allergies  No Known Allergies  	  REVIEW OF SYSTEMS:  Constitutional:   Eyes:  ENT:  CV:  Resp:  GI:  :  MSK:  Integumentary:  Neurological:  Psychiatric:  Endocrine:  Hematologic/Lymphatic:  Allergic/Immunologic:  [ ] All other systems negative  [ ] Unable to assess ROS because ________    OBJECTIVE:  ICU Vital Signs Last 24 Hrs  T(C): 37.2 (06 Oct 2023 07:23), Max: 37.8 (06 Oct 2023 05:12)  T(F): 98.9 (06 Oct 2023 07:23), Max: 100 (06 Oct 2023 05:12)  HR: 130 (06 Oct 2023 11:30) (114 - 147)  BP: 141/87 (06 Oct 2023 11:30) (139/89 - 198/118)  BP(mean): --  ABP: --  ABP(mean): --  RR: 33 (06 Oct 2023 11:30) (21 - 45)  SpO2: 99% (06 Oct 2023 11:30) (69% - 100%)    O2 Parameters below as of 06 Oct 2023 11:30  Patient On (Oxygen Delivery Method): mask, nonrebreather        CAPILLARY BLOOD GLUCOSE      PHYSICAL EXAM:  General: Alert and oriented, sitting upright comfortably in bed, speaking in full sentences  HEENT: PERRL, no scleral icterus  Respiratory: Equal breath sounds B/L with mild expiratory wheezing   Cardiovascular: Normal S1 and S2. Regular rate and rhythms. No Pedal edema. Peripheral pulses intact  Abdomen:  Soft, nondistended, nontender. No guarding or rebound tenderness. Bowel sound normal.  EXT: no edema or cyanosis   Neurological: No gross motor or sensory deficits  Psychiatry: A&O x3    HOSPITAL MEDICATIONS:  MEDICATIONS  (STANDING):  albuterol/ipratropium for Nebulization 3 milliLiter(s) Nebulizer every 6 hours  levoFLOXacin IVPB 750 milliGRAM(s) IV Intermittent every 24 hours  potassium chloride    Tablet ER 20 milliEquivalent(s) Oral every 2 hours    MEDICATIONS  (PRN):  acetaminophen     Tablet .. 650 milliGRAM(s) Oral every 6 hours PRN Temp greater or equal to 38C (100.4F), Mild Pain (1 - 3), Moderate Pain (4 - 6)  aluminum hydroxide/magnesium hydroxide/simethicone Suspension 30 milliLiter(s) Oral every 4 hours PRN Dyspepsia  melatonin 3 milliGRAM(s) Oral at bedtime PRN Insomnia  ondansetron Injectable 4 milliGRAM(s) IV Push every 8 hours PRN Nausea and/or Vomiting      LABS:                        11.5   16.81 )-----------( 218      ( 06 Oct 2023 05:30 )             36.3     10-    138  |  103  |  9   ----------------------------<  144<H>  3.4<L>   |  28  |  0.85    Ca    8.9      06 Oct 2023 05:30    TPro  7.9  /  Alb  3.6  /  TBili  1.8<H>  /  DBili  x   /  AST  36  /  ALT  47  /  AlkPhos  97  10-06    PT/INR - ( 06 Oct 2023 05:30 )   PT: 12.9 sec;   INR: 1.08 ratio         PTT - ( 06 Oct 2023 05:30 )  PTT:30.6 sec  Urinalysis Basic - ( 06 Oct 2023 10:25 )    Color: Yellow / Appearance: Clear / S.010 / pH: x  Gluc: x / Ketone: Negative  / Bili: Negative / Urobili: Negative mg/dL   Blood: x / Protein: 15 mg/dL / Nitrite: Negative   Leuk Esterase: Negative / RBC: 0-2 /HPF / WBC 3-5   Sq Epi: x / Non Sq Epi: x / Bacteria: Few      Arterial Blood Gas:  10-06 @ 05:13  7.43/42/85/28/97.8/3.2  ABG lactate: --        MICROBIOLOGY:     RADIOLOGY:      EKG:   CHIEF COMPLAINT:    HPI:  53 y.o F with no known PMHX presented to the ED with worsening SOB x1 day. Pt reported cough for 3-4 days associated with fever and malaise for one day. Pt states at approx. 2 am she developed increased work of breathing with no associated chest pain.  Pt denied N/V/D. No reported sick exposure. Denies history of smoking. Upon arrival to the ED pt was hypoxic to 69% on RA, required a nonrebreather associated with tachycardia in the 130s. Leukocytosis noted, elevated d-dimer 282, lactate 3, and proBNP 344. Sepsis workup was initiated, pending results. CTA of chest revealed no PE however revealed multifocal pneumonia. ICU was consulted for tachycardia.    At bedside patient was on a NRB at 10L with a SaO2 OF 98%. Pt does not have increased work of breathing, was sitting upright in bed comfortably speaking in full sentences with no difficulties. At this time patient is hemodynamically stable for the floor.       PAST MEDICAL  No pertinent past medical history    SOCIAL HISTORY:  Smoking: denies  EtOH Use: denies  Marital Status: ,  at bedside     Allergies  No Known Allergies  	  REVIEW OF SYSTEMS:  positive for fever, cough, and weakness, All other systems negative    OBJECTIVE:  ICU Vital Signs Last 24 Hrs  T(C): 37.2 (06 Oct 2023 07:23), Max: 37.8 (06 Oct 2023 05:12)  T(F): 98.9 (06 Oct 2023 07:23), Max: 100 (06 Oct 2023 05:12)  HR: 130 (06 Oct 2023 11:30) (114 - 147)  BP: 141/87 (06 Oct 2023 11:30) (139/89 - 198/118)  BP(mean): --  ABP: --  ABP(mean): --  RR: 33 (06 Oct 2023 11:30) (21 - 45)  SpO2: 99% (06 Oct 2023 11:30) (69% - 100%)    O2 Parameters below as of 06 Oct 2023 11:30  Patient On (Oxygen Delivery Method): mask, nonrebreather        CAPILLARY BLOOD GLUCOSE      PHYSICAL EXAM:  General: Alert and oriented, sitting upright comfortably in bed, speaking in full sentences  HEENT: PERRL, no scleral icterus  Respiratory: Equal breath sounds B/L with mild diffused expiratory wheezing   Cardiovascular: Normal S1 and S2. Regular rate and rhythms. No Pedal edema. Peripheral pulses intact  Abdomen:  Soft, nondistended, nontender. No guarding or rebound tenderness. Bowel sound normal.  EXT: no edema or cyanosis   Neurological: No gross motor or sensory deficits  Psychiatry: A&O x3    HOSPITAL MEDICATIONS:  MEDICATIONS  (STANDING):  albuterol/ipratropium for Nebulization 3 milliLiter(s) Nebulizer every 6 hours  levoFLOXacin IVPB 750 milliGRAM(s) IV Intermittent every 24 hours  potassium chloride    Tablet ER 20 milliEquivalent(s) Oral every 2 hours    MEDICATIONS  (PRN):  acetaminophen     Tablet .. 650 milliGRAM(s) Oral every 6 hours PRN Temp greater or equal to 38C (100.4F), Mild Pain (1 - 3), Moderate Pain (4 - 6)  aluminum hydroxide/magnesium hydroxide/simethicone Suspension 30 milliLiter(s) Oral every 4 hours PRN Dyspepsia  melatonin 3 milliGRAM(s) Oral at bedtime PRN Insomnia  ondansetron Injectable 4 milliGRAM(s) IV Push every 8 hours PRN Nausea and/or Vomiting      LABS:                        11.5   16.81 )-----------( 218      ( 06 Oct 2023 05:30 )             36.3     10-06    138  |  103  |  9   ----------------------------<  144<H>  3.4<L>   |  28  |  0.85    Ca    8.9      06 Oct 2023 05:30    TPro  7.9  /  Alb  3.6  /  TBili  1.8<H>  /  DBili  x   /  AST  36  /  ALT  47  /  AlkPhos  97  10-06    PT/INR - ( 06 Oct 2023 05:30 )   PT: 12.9 sec;   INR: 1.08 ratio         PTT - ( 06 Oct 2023 05:30 )  PTT:30.6 sec  Urinalysis Basic - ( 06 Oct 2023 10:25 )    Color: Yellow / Appearance: Clear / S.010 / pH: x  Gluc: x / Ketone: Negative  / Bili: Negative / Urobili: Negative mg/dL   Blood: x / Protein: 15 mg/dL / Nitrite: Negative   Leuk Esterase: Negative / RBC: 0-2 /HPF / WBC 3-5   Sq Epi: x / Non Sq Epi: x / Bacteria: Few      Arterial Blood Gas:  10-06 @ 05:13  7.43/42/85/28/97.8/3.2  ABG lactate: --      RADIOLOGY:  reviewed

## 2023-10-06 NOTE — ED PROVIDER NOTE - PROGRESS NOTE DETAILS
Dichter: Pt care signed out to me at change of shift, pending CT read & ICU consult. CT negative for PE, + BL lung opacities may represent pulm edema v multifocal PNA. ICU evaluated pt in ED, recommend Tele admission. Pt placed on BiPAP d/t increased WOB. Rpt lactate pending. Admit to Tele (d/w Dr Hedrick). Pt,  updated to results, admission. They understand / agree w/ this plan.

## 2023-10-06 NOTE — H&P ADULT - PROBLEM SELECTOR PLAN 2
Hypoxic to 69 % at RA, require nonrebreather, tachycardic  Cough, SOB, fever  Leukocytosis, elevated lactate  CTA chest  ( I personally review) with no PE. Bilateral lung opacities. Bronchiectasis  Received ceftriaxone and azithromycin  ICU consulted, ok for floor  Continue levofloxacin  Will change to high flow for hypoxia  follow up blood culture, check urine legionella Ag, strep pneumon Ag, mycoplasma IgM, sputum culture  RVP pending

## 2023-10-06 NOTE — H&P ADULT - PROBLEM SELECTOR PLAN 1
Hypoxic to 69 % at RA, require nonrebreather, tachycardic  Cough, SOB, fever  Leukocytosis, elevated lactate  CTA chest  ( I personally review) with no PE. Bilateral lung opacities. Bronchiectasis  Received ceftriaxone and azithromycin  ICU consulted, ok for floor  Continue levofloxacin  Will change to high flow for hypoxia  follow up blood culture, check urine legionella Ag, strep pneumon Ag, mycoplasma IgM, sputum culture  RVP pending  Closely monitor respiratory status, wean off oxygen as tolerate  Autoimmune work up

## 2023-10-06 NOTE — H&P ADULT - NSHPPHYSICALEXAM_GEN_ALL_CORE
CONSTITUTIONAL: alert and cooperative, moderate respiratory distress  EYES: PERRL, no scleral icterus  ENT: Mucosa moist, tongue normal  NECK: Neck supple, trachea midline, non-tender  CARDIAC: Normal S1 and S2. Regular rate and rhythms. No Pedal edema. Peripheral pulses intact  LUNGS: Equal air entry both lungs. Bilateral rales+. Increase respiratory effort.   ABDOMEN: Soft, nondistended, nontender. No guarding or rebound tenderness. No hepatomegaly or splenomegaly. Bowel sound normal.  MUSCULOSKELETAL: Normocephalic, atraumatic. No significant deformity or joint abnormality  NEUROLOGICAL: No gross motor or sensory deficits  PSYCHIATRIC: A&O x 3, appropriate mood and affect

## 2023-10-06 NOTE — ED PROVIDER NOTE - CLINICAL SUMMARY MEDICAL DECISION MAKING FREE TEXT BOX
54 yo F with sob, hypoxia, fever, concerning for covid, pna, PE, acs less likely, doubt pregnancy   -cbc, cmp, type and scren, abg, blood cx, dimer, hcg, lactate, bnp, trop, CXR, ekg, iv, CXR, ekg, azithro/cef coverage, hydration bolus, tylenol, albuterol trial for sob, monitor  -f/u results, reeval

## 2023-10-06 NOTE — ED PROVIDER NOTE - CARE PLAN
1 Principal Discharge DX:	Shortness of breath  Secondary Diagnosis:	Fever  Secondary Diagnosis:	Hypoxemia   Principal Discharge DX:	Shortness of breath  Secondary Diagnosis:	Fever  Secondary Diagnosis:	Hypoxia

## 2023-10-06 NOTE — ED ADULT NURSE NOTE - OBJECTIVE STATEMENT
Pt alert and oriented x4, complaining of "fever" started at 3am "I start breathing hard" around 9 pm. able to speak in full sentence. denies Pmhx. Sating in 70s on room air at home, 99% on NRB

## 2023-10-06 NOTE — ED PROVIDER NOTE - PHYSICAL EXAMINATION
Vitals: HTN at 146/90, tachy at 127, hypoxic at 69 % on RA  Gen: AAOx3, no distress after non-rebreather added, sitting uncomfortably in stretcher  Head: ncat, perrla, eomi b/l  Neck: supple, no lymphadenopathy, no midline deviation  Heart: rrr, no m/r/g  Lungs: CTA b/l, no rales/ronchi/wheezes  Abd: soft, nontender, non-distended, no rebound or guarding  Ext: no clubbing/cyanosis/edema  Neuro: sensation and muscle strength intact b/l

## 2023-10-06 NOTE — CONSULT NOTE ADULT - CRITICAL CARE ATTENDING COMMENT
I have personally seen and evaluated the patient. I agree with the findings and the plan of care  as documented above and made appropriate changes to reflect the assessment and plan for the patient.    Patient re-eval in ER. Noted to have worsening respiratory distress. increased work of breathing. Transitioned from RB this morning to HFNC to NIPPV. remains hypoxic to low 90's with tracheal retractions. Intubated in ER for hypoxic respiratory failure.    Pulmonary service at bedside.  updated. Evening Intensivist at bedside. ER staff at bedside.

## 2023-10-06 NOTE — CHART NOTE - NSCHARTNOTEFT_GEN_A_CORE
52 yo F hx poss RA p/w AHRF secondary to PNA. Deteriorated throughout the day and had RRT. Intubated during RRT and now pending transfer to ICU once bed available.  Pt CT showing severe GGO/consolidations as well as bronchiectasis. Unclear etiology of bronchiectasis poss due to RA vs prior infection.  Now meets ARDS criteria from PNA.    - cont analgosedation, maintain to goal rass  - also will need paralytics for vent dyssynchrony (and severe ARDS) so will keep more deep sedation  - titrate paralytics to TOF  - cont vent support and cont titrate fio2/peep to maintain sat >90%  - peak pressure very high after intubation (peak 58 and plat 49) indicating very poor compliance and P/F ratio indicating severe ARDS  - some improvement in plat now w/ adjustment in settings  - will cont paralytics and plan for proning if p/f does not improve soon  - if fails paralysis, vent support and proning vv ecmo would be an option but not considered until at least 24hrs of aggressive medical management  - cont pressors to maintain map>65  - echo to eval for LV and RV fxn and phtn (ct suggestive of severe phtn)  - cont monitor uo and lytes  - check sputum cx, bcx, mrsa pcr, afb  - cont zosyn and azithro  - no hx of immunosuppression or risk factor of fungal disease  - f/u serologies for autoimmune dz   - dvt ppx  - gi ppx    DVT ppx- hsq  [facility]  POCUS:    prognosis quite guarded   CCT 60 minutes 52 yo F hx poss RA p/w AHRF secondary to PNA. Deteriorated throughout the day and had RRT. Intubated during RRT and now pending transfer to ICU once bed available.  Pt CT showing severe GGO/consolidations as well as bronchiectasis. Unclear etiology of bronchiectasis poss due to RA vs prior infection.  Now meets ARDS criteria from PNA.    - cont analgosedation, maintain to goal rass  - also will need paralytics for vent dyssynchrony (and severe ARDS) so will keep more deep sedation  - titrate paralytics to TOF  - cont vent support and cont titrate fio2/peep to maintain sat >90%  - peak pressure very high after intubation (peak 58 and plat 49) indicating very poor compliance and P/F ratio indicating severe ARDS  - some improvement in plat now w/ adjustment in settings  - will cont paralytics and plan for proning if p/f does not improve soon  - if fails paralysis, vent support and proning vv ecmo would be an option but not considered until at least 24hrs of aggressive medical management  - cont pressors to maintain map>65  - echo to eval for LV and RV fxn and phtn (ct suggestive of severe phtn)  - cont monitor uo and lytes  - check sputum cx, bcx, mrsa pcr, afb  - cont zosyn and azithro  - no hx of immunosuppression or risk factor of fungal disease  - f/u serologies for autoimmune dz   - dvt ppx  - gi ppx    DVT ppx- hsq  [facility]  POCUS: LV fxn normal, RV size <LV, no TR jet, no pericardial effusion, IVC 2 cm, +lung sliding, b lines and consolidaiton at base    prognosis quite guarded   CCT 60 minutes 52 yo F hx poss RA p/w AHRF secondary to PNA. Deteriorated throughout the day and had RRT. Intubated during RRT and now pending transfer to ICU once bed available.  Pt CT showing severe GGO/consolidations as well as bronchiectasis. Unclear etiology of bronchiectasis poss due to RA vs prior infection.  Now meets ARDS criteria from PNA.      sedated, intubated  jasmin  mild exp wheezing, rhonchi  s1s2 reg tachy no murmurs  soft nontender +BS  no edema, no ulcer                        11.5   16.81 )-----------( 218      ( 06 Oct 2023 05:30 )             36.3   ABG - ( 06 Oct 2023 17:36 )  pH, Arterial: 7.31  pH, Blood: x     /  pCO2: 48    /  pO2: 71    / HCO3: 24    / Base Excess: -2.3  /  SaO2: 95.4          Plan:      - cont analgosedation, maintain to goal rass  - also will need paralytics for vent dyssynchrony (and severe ARDS) so will keep more deep sedation  - titrate paralytics to TOF  - cont vent support and cont titrate fio2/peep to maintain sat >90%  - peak pressure very high after intubation (peak 58 and plat 49) indicating very poor compliance and P/F ratio indicating severe ARDS  - some improvement in plat now w/ adjustment in settings  - will cont paralytics and plan for proning if p/f does not improve soon  - if fails paralysis, vent support and proning vv ecmo would be an option but not considered until at least 24hrs of aggressive medical management  - cont pressors to maintain map>65  - echo to eval for LV and RV fxn and phtn (ct suggestive of severe phtn)  - cont monitor uo and lytes  - check sputum cx, bcx, mrsa pcr, afb  - cont zosyn and azithro  - no hx of immunosuppression or risk factor of fungal disease  - f/u serologies for autoimmune dz   - dvt ppx  - gi ppx    DVT ppx- hsq  [facility] FC  POCUS: LV fxn normal, RV size <LV, no TR jet, no pericardial effusion, IVC 2 cm, +lung sliding, b lines and consolidaiton at base    prognosis quite guarded   CCT 60 minutes

## 2023-10-06 NOTE — CONSULT NOTE ADULT - ASSESSMENT
53 year old female presents with SOB, dyspnea and fever progressively worsening x 1 day. Negative for PE, CT with Multifocal PNA possible underlying Rheumatological disease causing bronchiectasis.     DX: Acute hypoxic respiratory failure; Bronchiectasis; Multifocal PNA.     RECCS:    - Acute Hypoxic resp failure likely 2/2 Multifocal PNA +/- chronic lung parenchymal changes consistent with Bronchectasis.   - Send sputum culture  - Would switch ABX to levaquin providing CAP, atypical and pseudomonal coverage   - Aerobika for secretion mobilization  - Duonebs  - Previous reported history of Rheumotological W/U without diagnosis and course of corticosteroids. Bronchiectasis and chronic lung changes on CT imaging. Would pursue Rheum W/U for possible source: ESR, CRP, LORA, RF, CCP antibody, Alpha 1 anti trypsin ordered  - No sign/ symptoms of joint manifestations of RA on physical exam/ ROS.   - QuantiFeron incase needs bilogic tx  -Plan of care discussed with Pulmonology MD Mcclain. Patient and family at bedside.

## 2023-10-06 NOTE — ED PROVIDER NOTE - INTERPRETATION
EKG performed in ED, sinus tach at 120, ST depression in II, III, aVF, sub-mm ELVIS in aVR and V1, normal intervals

## 2023-10-07 LAB
A1AT SERPL-MCNC: 206 MG/DL — HIGH (ref 90–200)
A1C WITH ESTIMATED AVERAGE GLUCOSE RESULT: 5.2 % — SIGNIFICANT CHANGE UP (ref 4–5.6)
ALBUMIN SERPL ELPH-MCNC: 2.7 G/DL — LOW (ref 3.3–5)
ALP SERPL-CCNC: 70 U/L — SIGNIFICANT CHANGE UP (ref 40–120)
ALT FLD-CCNC: 39 U/L — SIGNIFICANT CHANGE UP (ref 12–78)
ANION GAP SERPL CALC-SCNC: 6 MMOL/L — SIGNIFICANT CHANGE UP (ref 5–17)
AST SERPL-CCNC: 41 U/L — HIGH (ref 15–37)
BASE EXCESS BLDA CALC-SCNC: 1.3 MMOL/L — SIGNIFICANT CHANGE UP (ref -2–3)
BASE EXCESS BLDA CALC-SCNC: 1.3 MMOL/L — SIGNIFICANT CHANGE UP (ref -2–3)
BILIRUB SERPL-MCNC: 0.9 MG/DL — SIGNIFICANT CHANGE UP (ref 0.2–1.2)
BLOOD GAS COMMENTS ARTERIAL: SIGNIFICANT CHANGE UP
BLOOD GAS COMMENTS ARTERIAL: SIGNIFICANT CHANGE UP
BUN SERPL-MCNC: 9 MG/DL — SIGNIFICANT CHANGE UP (ref 7–23)
CALCIUM SERPL-MCNC: 8.6 MG/DL — SIGNIFICANT CHANGE UP (ref 8.5–10.1)
CHLORIDE SERPL-SCNC: 109 MMOL/L — HIGH (ref 96–108)
CHOLEST SERPL-MCNC: 175 MG/DL — SIGNIFICANT CHANGE UP
CK MB BLD-MCNC: 2.6 % — SIGNIFICANT CHANGE UP (ref 0–3.5)
CK MB BLD-MCNC: 2.9 % — SIGNIFICANT CHANGE UP (ref 0–3.5)
CK MB CFR SERPL CALC: 7.3 NG/ML — HIGH (ref 0.5–3.6)
CK MB CFR SERPL CALC: 7.8 NG/ML — HIGH (ref 0.5–3.6)
CK SERPL-CCNC: 255 U/L — HIGH (ref 26–192)
CK SERPL-CCNC: 302 U/L — HIGH (ref 26–192)
CO2 BLDA-SCNC: 27 MMOL/L — HIGH (ref 19–24)
CO2 BLDA-SCNC: 29 MMOL/L — HIGH (ref 19–24)
CO2 SERPL-SCNC: 25 MMOL/L — SIGNIFICANT CHANGE UP (ref 22–31)
CREAT SERPL-MCNC: 0.73 MG/DL — SIGNIFICANT CHANGE UP (ref 0.5–1.3)
CRP SERPL-MCNC: 169 MG/L — HIGH
CULTURE RESULTS: SIGNIFICANT CHANGE UP
EGFR: 98 ML/MIN/1.73M2 — SIGNIFICANT CHANGE UP
ERYTHROCYTE [SEDIMENTATION RATE] IN BLOOD: 74 MM/HR — HIGH (ref 0–20)
ESTIMATED AVERAGE GLUCOSE: 103 MG/DL — SIGNIFICANT CHANGE UP (ref 68–114)
GAS PNL BLDA: SIGNIFICANT CHANGE UP
GLUCOSE BLDC GLUCOMTR-MCNC: 126 MG/DL — HIGH (ref 70–99)
GLUCOSE BLDC GLUCOMTR-MCNC: 132 MG/DL — HIGH (ref 70–99)
GLUCOSE BLDC GLUCOMTR-MCNC: 135 MG/DL — HIGH (ref 70–99)
GLUCOSE BLDC GLUCOMTR-MCNC: 168 MG/DL — HIGH (ref 70–99)
GLUCOSE SERPL-MCNC: 148 MG/DL — HIGH (ref 70–99)
GRAM STN FLD: SIGNIFICANT CHANGE UP
HCO3 BLDA-SCNC: 26 MMOL/L — SIGNIFICANT CHANGE UP (ref 21–28)
HCO3 BLDA-SCNC: 27 MMOL/L — SIGNIFICANT CHANGE UP (ref 21–28)
HCT VFR BLD CALC: 31 % — LOW (ref 34.5–45)
HDLC SERPL-MCNC: 94 MG/DL — SIGNIFICANT CHANGE UP
HGB BLD-MCNC: 9.9 G/DL — LOW (ref 11.5–15.5)
HIV 1+2 AB+HIV1 P24 AG SERPL QL IA: SIGNIFICANT CHANGE UP
HOROWITZ INDEX BLDA+IHG-RTO: 60 — SIGNIFICANT CHANGE UP
HOROWITZ INDEX BLDA+IHG-RTO: 76 — SIGNIFICANT CHANGE UP
LACTATE SERPL-SCNC: 1.3 MMOL/L — SIGNIFICANT CHANGE UP (ref 0.7–2)
LIPID PNL WITH DIRECT LDL SERPL: 68 MG/DL — SIGNIFICANT CHANGE UP
MAGNESIUM SERPL-MCNC: 2.7 MG/DL — HIGH (ref 1.6–2.6)
MCHC RBC-ENTMCNC: 28 PG — SIGNIFICANT CHANGE UP (ref 27–34)
MCHC RBC-ENTMCNC: 31.9 G/DL — LOW (ref 32–36)
MCV RBC AUTO: 87.8 FL — SIGNIFICANT CHANGE UP (ref 80–100)
MRSA PCR RESULT.: SIGNIFICANT CHANGE UP
NIGHT BLUE STAIN TISS: SIGNIFICANT CHANGE UP
NIGHT BLUE STAIN TISS: SIGNIFICANT CHANGE UP
NON HDL CHOLESTEROL: 81 MG/DL — SIGNIFICANT CHANGE UP
NRBC # BLD: 0 /100 WBCS — SIGNIFICANT CHANGE UP (ref 0–0)
PCO2 BLDA: 40 MMHG — SIGNIFICANT CHANGE UP (ref 32–46)
PCO2 BLDA: 47 MMHG — HIGH (ref 32–46)
PH BLDA: 7.37 — SIGNIFICANT CHANGE UP (ref 7.35–7.45)
PH BLDA: 7.42 — SIGNIFICANT CHANGE UP (ref 7.35–7.45)
PHOSPHATE SERPL-MCNC: 1.9 MG/DL — LOW (ref 2.5–4.5)
PLATELET # BLD AUTO: 170 K/UL — SIGNIFICANT CHANGE UP (ref 150–400)
PO2 BLDA: 103 MMHG — SIGNIFICANT CHANGE UP (ref 83–108)
PO2 BLDA: 80 MMHG — LOW (ref 83–108)
POTASSIUM SERPL-MCNC: 4.3 MMOL/L — SIGNIFICANT CHANGE UP (ref 3.5–5.3)
POTASSIUM SERPL-SCNC: 4.3 MMOL/L — SIGNIFICANT CHANGE UP (ref 3.5–5.3)
PROT SERPL-MCNC: 6.6 GM/DL — SIGNIFICANT CHANGE UP (ref 6–8.3)
RBC # BLD: 3.53 M/UL — LOW (ref 3.8–5.2)
RBC # FLD: 14.1 % — SIGNIFICANT CHANGE UP (ref 10.3–14.5)
RHEUMATOID FACT SERPL-ACNC: 53 IU/ML — HIGH (ref 0–13)
S AUREUS DNA NOSE QL NAA+PROBE: SIGNIFICANT CHANGE UP
SAO2 % BLDA: 97 % — SIGNIFICANT CHANGE UP (ref 94–98)
SAO2 % BLDA: 98.3 % — HIGH (ref 94–98)
SODIUM SERPL-SCNC: 140 MMOL/L — SIGNIFICANT CHANGE UP (ref 135–145)
SPECIMEN SOURCE: SIGNIFICANT CHANGE UP
TRIGL SERPL-MCNC: 67 MG/DL — SIGNIFICANT CHANGE UP
TROPONIN I, HIGH SENSITIVITY RESULT: 441 NG/L — HIGH
TROPONIN I, HIGH SENSITIVITY RESULT: 726 NG/L — HIGH
TSH SERPL-MCNC: 0.18 UU/ML — LOW (ref 0.36–3.74)
WBC # BLD: 17.22 K/UL — HIGH (ref 3.8–10.5)
WBC # FLD AUTO: 17.22 K/UL — HIGH (ref 3.8–10.5)

## 2023-10-07 PROCEDURE — 93306 TTE W/DOPPLER COMPLETE: CPT | Mod: 26

## 2023-10-07 PROCEDURE — 71045 X-RAY EXAM CHEST 1 VIEW: CPT | Mod: 26,77

## 2023-10-07 PROCEDURE — 36620 INSERTION CATHETER ARTERY: CPT

## 2023-10-07 PROCEDURE — 93010 ELECTROCARDIOGRAM REPORT: CPT

## 2023-10-07 PROCEDURE — 71045 X-RAY EXAM CHEST 1 VIEW: CPT | Mod: 26

## 2023-10-07 PROCEDURE — 99291 CRITICAL CARE FIRST HOUR: CPT

## 2023-10-07 RX ORDER — SODIUM,POTASSIUM PHOSPHATES 278-250MG
1 POWDER IN PACKET (EA) ORAL ONCE
Refills: 0 | Status: COMPLETED | OUTPATIENT
Start: 2023-10-07 | End: 2023-10-07

## 2023-10-07 RX ADMIN — HEPARIN SODIUM 5000 UNIT(S): 5000 INJECTION INTRAVENOUS; SUBCUTANEOUS at 05:21

## 2023-10-07 RX ADMIN — PIPERACILLIN AND TAZOBACTAM 25 GRAM(S): 4; .5 INJECTION, POWDER, LYOPHILIZED, FOR SOLUTION INTRAVENOUS at 21:20

## 2023-10-07 RX ADMIN — PANTOPRAZOLE SODIUM 40 MILLIGRAM(S): 20 TABLET, DELAYED RELEASE ORAL at 11:55

## 2023-10-07 RX ADMIN — CHLORHEXIDINE GLUCONATE 15 MILLILITER(S): 213 SOLUTION TOPICAL at 05:39

## 2023-10-07 RX ADMIN — Medication 102 MILLIGRAM(S): at 05:20

## 2023-10-07 RX ADMIN — HEPARIN SODIUM 5000 UNIT(S): 5000 INJECTION INTRAVENOUS; SUBCUTANEOUS at 21:21

## 2023-10-07 RX ADMIN — Medication 3 MILLILITER(S): at 17:10

## 2023-10-07 RX ADMIN — CHLORHEXIDINE GLUCONATE 15 MILLILITER(S): 213 SOLUTION TOPICAL at 17:46

## 2023-10-07 RX ADMIN — Medication 3 MILLILITER(S): at 11:33

## 2023-10-07 RX ADMIN — CISATRACURIUM BESYLATE 8.89 MICROGRAM(S)/KG/MIN: 2 INJECTION INTRAVENOUS at 17:47

## 2023-10-07 RX ADMIN — Medication 4.63 MICROGRAM(S)/KG/MIN: at 22:40

## 2023-10-07 RX ADMIN — AZITHROMYCIN 255 MILLIGRAM(S): 500 TABLET, FILM COATED ORAL at 05:47

## 2023-10-07 RX ADMIN — PROPOFOL 5.93 MICROGRAM(S)/KG/MIN: 10 INJECTION, EMULSION INTRAVENOUS at 11:55

## 2023-10-07 RX ADMIN — PIPERACILLIN AND TAZOBACTAM 25 GRAM(S): 4; .5 INJECTION, POWDER, LYOPHILIZED, FOR SOLUTION INTRAVENOUS at 14:18

## 2023-10-07 RX ADMIN — PROPOFOL 5.93 MICROGRAM(S)/KG/MIN: 10 INJECTION, EMULSION INTRAVENOUS at 18:36

## 2023-10-07 RX ADMIN — Medication 1 PACKET(S): at 06:28

## 2023-10-07 RX ADMIN — PROPOFOL 5.93 MICROGRAM(S)/KG/MIN: 10 INJECTION, EMULSION INTRAVENOUS at 05:17

## 2023-10-07 RX ADMIN — Medication 3 MILLILITER(S): at 00:12

## 2023-10-07 RX ADMIN — Medication 3 MILLILITER(S): at 05:03

## 2023-10-07 RX ADMIN — HEPARIN SODIUM 5000 UNIT(S): 5000 INJECTION INTRAVENOUS; SUBCUTANEOUS at 14:18

## 2023-10-07 RX ADMIN — FENTANYL CITRATE 2.47 MICROGRAM(S)/KG/HR: 50 INJECTION INTRAVENOUS at 11:55

## 2023-10-07 RX ADMIN — PIPERACILLIN AND TAZOBACTAM 25 GRAM(S): 4; .5 INJECTION, POWDER, LYOPHILIZED, FOR SOLUTION INTRAVENOUS at 06:20

## 2023-10-07 RX ADMIN — Medication 4.63 MICROGRAM(S)/KG/MIN: at 08:13

## 2023-10-07 RX ADMIN — CHLORHEXIDINE GLUCONATE 1 APPLICATION(S): 213 SOLUTION TOPICAL at 06:22

## 2023-10-07 NOTE — PROGRESS NOTE ADULT - ASSESSMENT
53 year old female with acute hypoxic respiratory failure secondary to PNA now in ARDS     acute hypoxic respiratory failure   -Patient currently on Full vent support  -titrate settings to maintain SaO2 >90%, or pH >7.25  -consider low titdal volume ventilation strategy w/ goal Tv 4-6 cc/kg of ideal body weight  -plateu pressure goal <30  -Peridex oral care and VAP prophylaxis with HOB 30 degrees   -aggressive chest PT and suctioning   -daily sedation vacation with spontaneous breathing trial if clinical condition warrants, discuss with respiratory therapy     ARDS  -vent strategy follow ardsnet 4-8 mlkg predicted body weight tidal volume consider high peep with lowset Fio2 possible to oxygenate  -wean with breathing trial when peep less than 8 and FiO2 less 50  -if PaO2/FiO2 less than 100 inact proning protocol   - continue paralysis with nimbex infusion  and titrate to train of four 2/4  - use paralytics for vent desynchrony and for 48hrs then reassess  - plateau pressure checks Q 4 hrs goal less than 30 cmH20  -consider cuff leak evaluation if intubated greated than 10 days and if so use   -hold enteric feeding when paralysed or pronned     multifocal PNA   -Initial broad spectrum coverage for MDR orgamsims including staph aureus and gram negatives.   -Follow up sputum cultre the narrow specrtum based on culture  sensitivities     Elevated Troponin   -likely elevated due to hypoxia from the multifocal pneumonia  -consider echocardiogram  -consider anticoagulation if trending upward         53 year old female with acute hypoxic respiratory failure secondary to PNA now in ARDS     acute hypoxic respiratory failure   -Patient currently on Full vent support  -titrate settings to maintain SaO2 >90%, or pH >7.25  -consider low titdal volume ventilation strategy w/ goal Tv 4-6 cc/kg of ideal body weight  -plateu pressure goal <30  -Peridex oral care and VAP prophylaxis with HOB 30 degrees   -aggressive chest PT and suctioning   -daily sedation vacation with spontaneous breathing trial if clinical condition warrants, discuss with respiratory therapy     ARDS  -vent strategy follow ardsnet 4-8 mlkg predicted body weight tidal volume consider high peep with lowset Fio2 possible to oxygenate  -wean with breathing trial when peep less than 8 and FiO2 less 50  -if PaO2/FiO2 less than 100 inact proning protocol   - continue paralysis with nimbex infusion  and titrate to train of four 2/4  - use paralytics for vent desynchrony and for 48hrs then reassess  - plateau pressure checks Q 4 hrs goal less than 30 cmH20  -consider cuff leak evaluation if intubated greated than 10 days and if so use   -hold enteric feeding when paralysed or pronned     multifocal PNA   -Initial broad spectrum coverage for MDR orgamsims including staph aureus and gram negatives.   -Follow up sputum cultre the narrow specrtum based on culture  sensitivities     Elevated Troponin   -likely elevated due to hypoxia from the multifocal pneumonia  -consider echocardiogram  -consider anticoagulation if trending upward      Glycemic control   -tight control while on steroids   - Regular Insulin Slide Scale  - Finger sticks Q 6 hours           53 year old female with acute hypoxic respiratory failure secondary to PNA now in ARDS     acute hypoxic respiratory failure   -Patient currently on Full vent support  -titrate settings to maintain SaO2 >90%, or pH >7.25  -consider low titdal volume ventilation strategy w/ goal Tv 4-6 cc/kg of ideal body weight  -plateu pressure goal <30  -Peridex oral care and VAP prophylaxis with HOB 30 degrees   -aggressive chest PT and suctioning   -daily sedation vacation with spontaneous breathing trial if clinical condition warrants, discuss with respiratory therapy     ARDS  -vent strategy follow ardsnet 4-8 mlkg predicted body weight tidal volume consider high peep with lowset Fio2 possible to oxygenate  -wean with breathing trial when peep less than 8 and FiO2 less 50  -if PaO2/FiO2 less than 100 inact proning protocol  -current PaO2/FiO2 ratio is 135 with abg showing Pao2 of 103 on 76% Fio2 which is an improvement from start of shift    - continue paralysis with nimbex infusion  and titrate to train of four 2/4  - use paralytics for vent desynchrony and for 48hrs then reassess  - plateau pressure checks Q 4 hrs goal less than 30 cmH20  -consider cuff leak evaluation if intubated greated than 10 days and if so use   -hold enteric feeding when paralysed or pronned     multifocal PNA   -Initial broad spectrum coverage for MDR orgamsims including staph aureus and gram negatives.   -Follow up sputum cultre the narrow specrtum based on culture  sensitivities     Elevated Troponin   -likely elevated due to hypoxia from the multifocal pneumonia  -consider echocardiogram  -consider anticoagulation if trending upward      Glycemic control   -tight control while on steroids   - Regular Insulin Slide Scale  - Finger sticks Q 6 hours           53 year old female with acute hypoxic respiratory failure secondary to PNA now in ARDS and septic shock requiring IV pressors     acute hypoxic respiratory failure   -Patient currently on Full vent support  -titrate settings to maintain SaO2 >90%, or pH >7.25  -consider low titdal volume ventilation strategy w/ goal Tv 4-6 cc/kg of ideal body weight  -plateu pressure goal <30  -Peridex oral care and VAP prophylaxis with HOB 30 degrees   -aggressive chest PT and suctioning   -daily sedation vacation with spontaneous breathing trial if clinical condition warrants, discuss with respiratory therapy     ARDS  -vent strategy follow ardsnet 4-8 mlkg predicted body weight tidal volume consider high peep with lowset Fio2 possible to oxygenate  -wean with breathing trial when peep less than 8 and FiO2 less 50  -if PaO2/FiO2 less than 100 inact proning protocol  -current PaO2/FiO2 ratio is 135 with abg showing Pao2 of 103 on 76% Fio2 which is an improvement from start of shift    - continue paralysis with nimbex infusion  and titrate to train of four 2/4  - use paralytics for vent desynchrony and for 48hrs then reassess  - plateau pressure checks Q 4 hrs goal less than 30 cmH20  -consider cuff leak evaluation if intubated greater than 10 days and if so use   -hold enteric feeding when paralysed or prone     multifocal PNA   -Initial broad spectrum coverage for MDR organisms including staph aureus and gram negatives. ( currently on Zosyn and Azithromycin)   -Follow up sputum culture the narrow spectrum based on culture sensitivities     septic shock  -30 cc/kg fluid challenge without improvement in blood pressure  -patient currently on Levophed, will titrate for MAP 65-70  -repeat lactate ( last lactate uptrend at 6.8)  -blood/urine/sputum cultures, then initiate broad spectrum antibiotics  -follow cultures and narrow antibiotics as able  -goal UOP >0.5 cc/kg/hr  -continue steroids     Elevated Troponin   -likely elevated due to hypoxia from the multifocal pneumonia  -consider echocardiogram  -consider anticoagulation if trending upward      Glycemic control   -tight control while on steroids   - Regular Insulin Slide Scale  - Finger sticks Q 6 hours    prophylactic measures  -continue  heparin SQ for DVT with SD'S  -protonix for GI  - aspiration precautions by keeping head of bed at 30 degrees

## 2023-10-07 NOTE — PROGRESS NOTE ADULT - ASSESSMENT
53 year old female with acute hypoxic respiratory failure secondary to PNA now in ARDS and septic shock requiring IV pressors.    Paralytics.  LTVV. ARDSnet  Decadron 10mg for severe ARDS  levophed titration for shock. Trend Cardiac enzymes. repeat EKG.  GI ppx. TF  Strict I/O's. Diuresis prn.  dvt ppx.  abx for pneumonia.  Trend ABG  critically ill.

## 2023-10-07 NOTE — PATIENT PROFILE ADULT - FUNCTIONAL ASSESSMENT - BASIC MOBILITY 6.
1-calculated by average/Not able to assess (calculate score using [employer] averaging method)

## 2023-10-07 NOTE — PATIENT PROFILE ADULT - FALL HARM RISK - RISK INTERVENTIONS

## 2023-10-07 NOTE — PROGRESS NOTE ADULT - SUBJECTIVE AND OBJECTIVE BOX
53y  Female  No Known Allergies    CC; Patient is a 53y old  Female who presents with a chief complaint of hypoxic respiratory failure, multifocal pneumonia    HPI:  53 years old female with no significant PMH present to ED with complain of worsening SOB for 1 day. Patient reported cough for 3-4 days, fever for 1 day and worsening SOB for 1 day. No nausea, vomiting or diarrhea. Initially seen buy ICU team and was deemed stable for tele admission but later in the afternoon she decompensated from a respiratory perspective.  Rapid response was called for hypoxia and respiratory distress with tachypnea. She was desatting on NRB to the 70s with respiratory rate in the RR 40s and increased work of breathing with accessory muscle use then subsequently Intubated and placed on mechanical ventilation . CTA chest with no PE. Bilateral lung opacities with bronchiectasis.    Admitted to ICU with respiratory status worsening over the next few hours with high FI02 requirements high airway pressures and desynchrony with the vent requiring sedation and neuromuscular blockade to control respiratory drive for optimizing ventilation and oxygenation. Overnight the peak airway pressures have come down, oxygen sats have been steady at % and have made come progress with decrease of FIo2 to to 75%.       SH: no toxic habits  FH: HTN (06 Oct 2023 11:25)    PAST MEDICAL & SURGICAL HISTORY:  No pertinent past medical history  No significant past surgical history    Vital Signs Last 24 Hrs  T(C): 35.8 (07 Oct 2023 04:00), Max: 38.5 (06 Oct 2023 19:30)  T(F): 96.4 (07 Oct 2023 04:00), Max: 101.3 (06 Oct 2023 19:30)  HR: 82 (07 Oct 2023 04:00) (82 - 153)  BP: 93/71 (07 Oct 2023 04:00) (74/52 - 198/118)  BP(mean): 80 (07 Oct 2023 04:00) (60 - 103)  RR: 30 (07 Oct 2023 04:00) (21 - 48)  SpO2: 100% (07 Oct 2023 04:00) (77% - 100%)    Parameters below as of 07 Oct 2023 00:12  Patient On (Oxygen Delivery Method): ventilator    ABG - ( 06 Oct 2023 17:36 )  pH, Arterial: 7.31  pH, Blood: x     /  pCO2: 48    /  pO2: 71    / HCO3: 24    / Base Excess: -2.3  /  SaO2: 95.4      I&O's Summary  06 Oct 2023 07:01  -  07 Oct 2023 04:52  --------------------------------------------------------  IN: 490.8 mL / OUT: 0 mL / NET: 490.8 mL      LABS  10-06  138  |  107  |  6<L>  ----------------------------<  170<H>  3.5   |  24  |  0.69    Ca    8.7      06 Oct 2023 16:00  Phos  3.2     10-06  Mg     1.7     10-06  TPro  7.2  /  Alb  3.0<L>  /  TBili  1.3<H>  /  DBili  x   /  AST  43<H>  /  ALT  47  /  AlkPhos  83  10-06                        9.9    17.22 )-----------( 170      ( 07 Oct 2023 04:00 )             31.0     PT/INR - ( 06 Oct 2023 05:30 )   PT: 12.9 sec;   INR: 1.08 ratio     PTT - ( 06 Oct 2023 05:30 )  PTT:30.6 sec  CARDIAC MARKERS ( 07 Oct 2023 04:00 )  x     / x     / 255 U/L / x     / 7.3 ng/mL  CARDIAC MARKERS ( 06 Oct 2023 10:50 )  x     / x     / 177 U/L / x     / 3.8 ng/mL    LIVER FUNCTIONS - ( 06 Oct 2023 16:00 )  Alb: 3.0 g/dL / Pro: 7.2 gm/dL / ALK PHOS: 83 U/L / ALT: 47 U/L / AST: 43 U/L / GGT: x           CAPILLARY BLOOD GLUCOSE  POCT Blood Glucose.: 168 mg/dL (07 Oct 2023 00:00)    Urinalysis Basic - ( 06 Oct 2023 16:00 )  Color: x / Appearance: x / SG: x / pH: x  Gluc: 170 mg/dL / Ketone: x  / Bili: x / Urobili: x   Blood: x / Protein: x / Nitrite: x   Leuk Esterase: x / RBC: x / WBC x   Sq Epi: x / Non Sq Epi: x / Bacteria: x    VENT SETTINGS   Mode: AC/ CMV (Assist Control/ Continuous Mandatory Ventilation)  RR (machine): 30  TV (machine): 300  FiO2: 80  PEEP: 5  ITime: 0.8  MAP: 14  PIP: 37    MEDICATIONS  (STANDING):  albuterol/ipratropium for Nebulization 3 milliLiter(s) Nebulizer every 6 hours  azithromycin  IVPB 500 milliGRAM(s) IV Intermittent every 24 hours  chlorhexidine 0.12% Liquid 15 milliLiter(s) Oral Mucosa every 12 hours  chlorhexidine 2% Cloths 1 Application(s) Topical <User Schedule>  cisatracurium Infusion 3 MICROgram(s)/kG/Min (8.89 mL/Hr) IV Continuous <Continuous>  dexAMETHasone  IVPB 10 milliGRAM(s) IV Intermittent daily  fentaNYL   Infusion. 0.5 MICROgram(s)/kG/Hr (2.47 mL/Hr) IV Continuous <Continuous>  heparin   Injectable 5000 Unit(s) SubCutaneous every 8 hours  insulin lispro (ADMELOG) corrective regimen sliding scale   SubCutaneous every 6 hours  norepinephrine Infusion 0.05 MICROgram(s)/kG/Min (4.63 mL/Hr) IV Continuous <Continuous>  pantoprazole  Injectable 40 milliGRAM(s) IV Push daily  piperacillin/tazobactam IVPB.. 3.375 Gram(s) IV Intermittent every 8 hours  propofol Infusion 20 MICROgram(s)/kG/Min (5.93 mL/Hr) IV Continuous <Continuous>    REVIEW OF SYSTEMS:    Unable to obtain due to mechnical ventilation, sedation     Physicial Exam:     Constitutional: intubated and sedated   HEENT: PERRLA  Neck:  No JVD  Respiratory: Breath Sounds equal rhonchi via auscultation, no accessory muscle use  Cardiovascular: Regular rate & rhythm, normal S1, S2;  no S3, S4  Gastrointestinal: Soft, non-tender, non distended no hepatosplenomegaly, normal bowel sounds  Extremities: No peripheral edema, No cyanosis, clubbing   Vascular: Equal and normal pulses: 2+ peripheral pulses throughout  Neurological: sedated and paralysed with neuromuscular blockade for vent desynchrony   Musculoskeletal: No joint pain, swelling or deformity  Skin: No rashes             53y  Female  No Known Allergies    CC; Patient is a 53y old  Female who presents with a chief complaint of hypoxic respiratory failure, multifocal pneumonia    HPI:  53 years old female with no significant PMH present to ED with complain of worsening SOB for 1 day. Patient reported cough for 3-4 days, fever for 1 day and worsening SOB for 1 day. No nausea, vomiting or diarrhea. Initially seen buy ICU team and was deemed stable for tele admission but later in the afternoon she decompensated from a respiratory perspective.  Rapid response was called for hypoxia and respiratory distress with tachypnea. She was desatting on NRB to the 70s with respiratory rate in the RR 40s and increased work of breathing with accessory muscle use then subsequently Intubated and placed on mechanical ventilation . CTA chest with no PE. Bilateral lung opacities with bronchiectasis.    Admitted to ICU with respiratory status worsening over the next few hours with high FI02 requirements high airway pressures and desynchrony with the vent requiring sedation and neuromuscular blockade to control respiratory drive for optimizing ventilation and oxygenation. Overnight the peak airway pressures have come down, oxygen sats have been steady at % and have made come progress with decrease of FIo2 to to 76% and an increase in PaO2/FiO2 ratio now at 135 from 56 at the start of shift. Note the previous ratio was without neuromuscular blockade and vent desynchrony at present. The  Cisatracurium seems to be assisting in the control of ventilatory drive and improving ventilation and oxygenation, that said will hold off on prone position at this time         SH: no toxic habits  FH: HTN (06 Oct 2023 11:25)    PAST MEDICAL & SURGICAL HISTORY:  No pertinent past medical history  No significant past surgical history    Vital Signs Last 24 Hrs  T(C): 35.8 (07 Oct 2023 04:00), Max: 38.5 (06 Oct 2023 19:30)  T(F): 96.4 (07 Oct 2023 04:00), Max: 101.3 (06 Oct 2023 19:30)  HR: 82 (07 Oct 2023 04:00) (82 - 153)  BP: 93/71 (07 Oct 2023 04:00) (74/52 - 198/118)  BP(mean): 80 (07 Oct 2023 04:00) (60 - 103)  RR: 30 (07 Oct 2023 04:00) (21 - 48)  SpO2: 100% (07 Oct 2023 04:00) (77% - 100%)    Parameters below as of 07 Oct 2023 00:12  Patient On (Oxygen Delivery Method): ventilator    ABG - ( 06 Oct 2023 17:36 )  pH, Arterial: 7.31  pH, Blood: x     /  pCO2: 48    /  pO2: 71    / HCO3: 24    / Base Excess: -2.3  /  SaO2: 95.4      I&O's Summary  06 Oct 2023 07:01  -  07 Oct 2023 04:52  --------------------------------------------------------  IN: 490.8 mL / OUT: 0 mL / NET: 490.8 mL      LABS  10-06  138  |  107  |  6<L>  ----------------------------<  170<H>  3.5   |  24  |  0.69    Ca    8.7      06 Oct 2023 16:00  Phos  3.2     10-06  Mg     1.7     10-06  TPro  7.2  /  Alb  3.0<L>  /  TBili  1.3<H>  /  DBili  x   /  AST  43<H>  /  ALT  47  /  AlkPhos  83  10-06                        9.9    17.22 )-----------( 170      ( 07 Oct 2023 04:00 )             31.0     PT/INR - ( 06 Oct 2023 05:30 )   PT: 12.9 sec;   INR: 1.08 ratio     PTT - ( 06 Oct 2023 05:30 )  PTT:30.6 sec  CARDIAC MARKERS ( 07 Oct 2023 04:00 )  x     / x     / 255 U/L / x     / 7.3 ng/mL  CARDIAC MARKERS ( 06 Oct 2023 10:50 )  x     / x     / 177 U/L / x     / 3.8 ng/mL    LIVER FUNCTIONS - ( 06 Oct 2023 16:00 )  Alb: 3.0 g/dL / Pro: 7.2 gm/dL / ALK PHOS: 83 U/L / ALT: 47 U/L / AST: 43 U/L / GGT: x           CAPILLARY BLOOD GLUCOSE  POCT Blood Glucose.: 168 mg/dL (07 Oct 2023 00:00)    Urinalysis Basic - ( 06 Oct 2023 16:00 )  Color: x / Appearance: x / SG: x / pH: x  Gluc: 170 mg/dL / Ketone: x  / Bili: x / Urobili: x   Blood: x / Protein: x / Nitrite: x   Leuk Esterase: x / RBC: x / WBC x   Sq Epi: x / Non Sq Epi: x / Bacteria: x    VENT SETTINGS   Mode: AC/ CMV (Assist Control/ Continuous Mandatory Ventilation)  RR (machine): 30  TV (machine): 300  FiO2: 80  PEEP: 5  ITime: 0.8  MAP: 14  PIP: 37    MEDICATIONS  (STANDING):  albuterol/ipratropium for Nebulization 3 milliLiter(s) Nebulizer every 6 hours  azithromycin  IVPB 500 milliGRAM(s) IV Intermittent every 24 hours  chlorhexidine 0.12% Liquid 15 milliLiter(s) Oral Mucosa every 12 hours  chlorhexidine 2% Cloths 1 Application(s) Topical <User Schedule>  cisatracurium Infusion 3 MICROgram(s)/kG/Min (8.89 mL/Hr) IV Continuous <Continuous>  dexAMETHasone  IVPB 10 milliGRAM(s) IV Intermittent daily  fentaNYL   Infusion. 0.5 MICROgram(s)/kG/Hr (2.47 mL/Hr) IV Continuous <Continuous>  heparin   Injectable 5000 Unit(s) SubCutaneous every 8 hours  insulin lispro (ADMELOG) corrective regimen sliding scale   SubCutaneous every 6 hours  norepinephrine Infusion 0.05 MICROgram(s)/kG/Min (4.63 mL/Hr) IV Continuous <Continuous>  pantoprazole  Injectable 40 milliGRAM(s) IV Push daily  piperacillin/tazobactam IVPB.. 3.375 Gram(s) IV Intermittent every 8 hours  propofol Infusion 20 MICROgram(s)/kG/Min (5.93 mL/Hr) IV Continuous <Continuous>    REVIEW OF SYSTEMS:    Unable to obtain due to mechnical ventilation, sedation     Physicial Exam:     Constitutional: intubated and sedated   HEENT: PERRLA  Neck:  No JVD  Respiratory: Breath Sounds equal rhonchi via auscultation, no accessory muscle use  Cardiovascular: Regular rate & rhythm, normal S1, S2;  no S3, S4  Gastrointestinal: Soft, non-tender, non distended no hepatosplenomegaly, normal bowel sounds  Extremities: No peripheral edema, No cyanosis, clubbing   Vascular: Equal and normal pulses: 2+ peripheral pulses throughout  Neurological: sedated and paralysed with neuromuscular blockade for vent desynchrony   Musculoskeletal: No joint pain, swelling or deformity  Skin: No rashes

## 2023-10-07 NOTE — PATIENT PROFILE ADULT - NSTRANSFERBELONGINGSDISPO_GEN_A_NUR
given to family Methotrexate Pregnancy And Lactation Text: This medication is Pregnancy Category X and is known to cause fetal harm. This medication is excreted in breast milk.

## 2023-10-07 NOTE — PROCEDURE NOTE - ADDITIONAL PROCEDURE DETAILS
This line was established in the setting of acute hypoxic respiratory failure, severe sepsis with ARDS and poor peripheral access

## 2023-10-07 NOTE — PROCEDURE NOTE - NSINFORMCONSENT_GEN_A_CORE
decompress abd for better ventilation/This was an emergent procedure.
Benefits, risks, and possible complications of procedure explained to patient/caregiver who verbalized understanding and gave written consent.
This was an emergent procedure.
hypoxemic on bipap/This was an emergent procedure.

## 2023-10-07 NOTE — PROGRESS NOTE ADULT - SUBJECTIVE AND OBJECTIVE BOX
INTERVAL HPI/OVERNIGHT EVENTS:   HPI:  53 years old female with no significant PMH present to ED with complain of worsening SOB for 1 day. Patietn reported cough for 3-4 days, fever for 1 day and worsening SOB for 1 day. No nausea, vomiting or diarrhea. No known sick exposure.  Hypoxic to 69 % at RA, require nonrebreather, tachycardic. WBC 16.81, plt 218, ddimer 282, K 3.4, Cr 0.85, lactate 3, hsTnT 107.4, proBNP 344. CTA chest with no PE. Bilateral lung opacities. Bronchiectasis.       Admitted to ICU with respiratory status worsening over the next few hours with high FI02 requirements high airway pressures and desynchrony with the vent requiring sedation and neuromuscular blockade to control respiratory drive for optimizing ventilation and oxygenation. Overnight the peak airway pressures have come down, oxygen sats have been steady at % and have made come progress with decrease of FIo2 to to 76% and an increase in PaO2/FiO2 ratio now at 135 from 56 at the start of shift. Note the previous ratio was without neuromuscular blockade and vent desynchrony at present. The  Cisatracurium seems to be assisting in the control of ventilatory drive and improving ventilation and oxygenation.    SH: no toxic habits  FH: HTN (06 Oct 2023 11:25)      CENTRAL LINE: [ ] YES [ ] NO  LOCATION:   DATE INSERTED:  REMOVE: [ ] YES [ ] NO  EXPLAIN:    CARRIZALES: [ ] YES [ ] NO    DATE INSERTED:  REMOVE:  [ ] YES [ ] NO  EXPLAIN:    A-LINE:  [ ] YES [ ] NO  LOCATION:   DATE INSERTED:  REMOVE:  [ ] YES [ ] NO  EXPLAIN:    PAST MEDICAL & SURGICAL HISTORY:  No pertinent past medical history      No significant past surgical history      REVIEW OF SYSTEMS:    Negative ROS aside from HPI/Interval events above.    ICU Vital Signs Last 24 Hrs  T(C): 35.4 (07 Oct 2023 07:31), Max: 38.5 (06 Oct 2023 19:30)  T(F): 95.7 (07 Oct 2023 07:31), Max: 101.3 (06 Oct 2023 19:30)  HR: 81 (07 Oct 2023 08:05) (77 - 153)  BP: 88/67 (07 Oct 2023 07:31) (74/52 - 175/102)  BP(mean): 75 (07 Oct 2023 07:31) (60 - 103)  ABP: --  ABP(mean): --  RR: 24 (07 Oct 2023 07:31) (24 - 48)  SpO2: 96% (07 Oct 2023 08:05) (77% - 100%)    O2 Parameters below as of 07 Oct 2023 06:00  Patient On (Oxygen Delivery Method): ventilator    O2 Concentration (%): 76        ABG - ( 07 Oct 2023 05:34 )  pH, Arterial: 7.42  pH, Blood: x     /  pCO2: 40    /  pO2: 103   / HCO3: 26    / Base Excess: 1.3   /  SaO2: 98.3          I&O's Detail    06 Oct 2023 07:01  -  07 Oct 2023 07:00  --------------------------------------------------------  IN:    Cisatracurium: 76.8 mL    FentaNYL: 98 mL    IV PiggyBack: 450 mL    IV PiggyBack: 200 mL    Norepinephrine: 145.1 mL    Propofol: 107.1 mL  Total IN: 1077 mL    OUT:    Voided (mL): 400 mL  Total OUT: 400 mL    Total NET: 677 mL      07 Oct 2023 07:01  -  07 Oct 2023 09:49  --------------------------------------------------------  IN:    Cisatracurium: 17.8 mL    FentaNYL: 28 mL    Norepinephrine: 16.7 mL    Propofol: 33.6 mL  Total IN: 96.1 mL    OUT:    Indwelling Catheter - Urethral (mL): 800 mL    Voided (mL): 200 mL  Total OUT: 1000 mL    Total NET: -903.9 mL      Mode: AC/ CMV (Assist Control/ Continuous Mandatory Ventilation)  RR (machine): 24  TV (machine): 300  FiO2: 60  PEEP: 5  ITime: 0.8  MAP: 13  PIP: 41    CAPILLARY BLOOD GLUCOSE      POCT Blood Glucose.: 135 mg/dL (07 Oct 2023 05:04)  POCT Blood Glucose.: 168 mg/dL (07 Oct 2023 00:00)  POCT Blood Glucose.: 179 mg/dL (06 Oct 2023 17:43)        PHYSICAL EXAM:    *******      LABS:                        9.9    17.22 )-----------( 170      ( 07 Oct 2023 04:00 )             31.0      10-07    140  |  109<H>  |  9   ----------------------------<  148<H>  4.3   |  25  |  0.73    Ca    8.6      07 Oct 2023 04:00  Phos  1.9     10-07  Mg     2.7     10-07    TPro  6.6  /  Alb  2.7<L>  /  TBili  0.9  /  DBili  x   /  AST  41<H>  /  ALT  39  /  AlkPhos  70  10-07    PT/INR - ( 06 Oct 2023 05:30 )   PT: 12.9 sec;   INR: 1.08 ratio         PTT - ( 06 Oct 2023 05:30 )  PTT:30.6 sec  Urinalysis Basic - ( 07 Oct 2023 04:00 )    Color: x / Appearance: x / SG: x / pH: x  Gluc: 148 mg/dL / Ketone: x  / Bili: x / Urobili: x   Blood: x / Protein: x / Nitrite: x   Leuk Esterase: x / RBC: x / WBC x   Sq Epi: x / Non Sq Epi: x / Bacteria: x      Culture Results:   No growth at 24 hours (10-06 @ 05:30)  Culture Results:   No growth at 24 hours (10-06 @ 05:30)   INTERVAL HPI/OVERNIGHT EVENTS:   HPI:  53 years old female with no significant PMH present to ED with complain of worsening SOB for 1 day. Patietn reported cough for 3-4 days, fever for 1 day and worsening SOB for 1 day. No nausea, vomiting or diarrhea. No known sick exposure.  Hypoxic to 69 % at RA, require nonrebreather, tachycardic. WBC 16.81, plt 218, ddimer 282, K 3.4, Cr 0.85, lactate 3, hsTnT 107.4, proBNP 344. CTA chest with no PE. Bilateral lung opacities. Bronchiectasis.       Admitted to ICU with respiratory status worsening over the next few hours with high FI02 requirements high airway pressures and desynchrony with the vent requiring sedation and neuromuscular blockade to control respiratory drive for optimizing ventilation and oxygenation. Overnight the peak airway pressures have come down, oxygen sats have been steady at % and have made come progress with decrease of FIo2 to to 76% and an increase in PaO2/FiO2 ratio now at 135 from 56 at the start of shift. Note the previous ratio was without neuromuscular blockade and vent desynchrony at present. The  Cisatracurium seems to be assisting in the control of ventilatory drive and improving ventilation and oxygenation.    SH: no toxic habits  FH: HTN (06 Oct 2023 11:25)      CENTRAL LINE: [ ] YES [ ] NO  LOCATION:   DATE INSERTED:  REMOVE: [ ] YES [ ] NO  EXPLAIN:    CARRIZALES: [ ] YES [ ] NO    DATE INSERTED:  REMOVE:  [ ] YES [ ] NO  EXPLAIN:    A-LINE:  [ ] YES [ ] NO  LOCATION:   DATE INSERTED:  REMOVE:  [ ] YES [ ] NO  EXPLAIN:    PAST MEDICAL & SURGICAL HISTORY:  No pertinent past medical history      No significant past surgical history      REVIEW OF SYSTEMS:    Negative ROS aside from HPI/Interval events above.    ICU Vital Signs Last 24 Hrs  T(C): 35.4 (07 Oct 2023 07:31), Max: 38.5 (06 Oct 2023 19:30)  T(F): 95.7 (07 Oct 2023 07:31), Max: 101.3 (06 Oct 2023 19:30)  HR: 81 (07 Oct 2023 08:05) (77 - 153)  BP: 88/67 (07 Oct 2023 07:31) (74/52 - 175/102)  BP(mean): 75 (07 Oct 2023 07:31) (60 - 103)  ABP: --  ABP(mean): --  RR: 24 (07 Oct 2023 07:31) (24 - 48)  SpO2: 96% (07 Oct 2023 08:05) (77% - 100%)    O2 Parameters below as of 07 Oct 2023 06:00  Patient On (Oxygen Delivery Method): ventilator    O2 Concentration (%): 76        ABG - ( 07 Oct 2023 05:34 )  pH, Arterial: 7.42  pH, Blood: x     /  pCO2: 40    /  pO2: 103   / HCO3: 26    / Base Excess: 1.3   /  SaO2: 98.3          I&O's Detail    06 Oct 2023 07:01  -  07 Oct 2023 07:00  --------------------------------------------------------  IN:    Cisatracurium: 76.8 mL    FentaNYL: 98 mL    IV PiggyBack: 450 mL    IV PiggyBack: 200 mL    Norepinephrine: 145.1 mL    Propofol: 107.1 mL  Total IN: 1077 mL    OUT:    Voided (mL): 400 mL  Total OUT: 400 mL    Total NET: 677 mL      07 Oct 2023 07:01  -  07 Oct 2023 09:49  --------------------------------------------------------  IN:    Cisatracurium: 17.8 mL    FentaNYL: 28 mL    Norepinephrine: 16.7 mL    Propofol: 33.6 mL  Total IN: 96.1 mL    OUT:    Indwelling Catheter - Urethral (mL): 800 mL    Voided (mL): 200 mL  Total OUT: 1000 mL    Total NET: -903.9 mL      Mode: AC/ CMV (Assist Control/ Continuous Mandatory Ventilation)  RR (machine): 24  TV (machine): 300  FiO2: 60  PEEP: 5  ITime: 0.8  MAP: 13  PIP: 41    CAPILLARY BLOOD GLUCOSE      POCT Blood Glucose.: 135 mg/dL (07 Oct 2023 05:04)  POCT Blood Glucose.: 168 mg/dL (07 Oct 2023 00:00)  POCT Blood Glucose.: 179 mg/dL (06 Oct 2023 17:43)        PHYSICAL EXAM:    sedated. paralyzed.  coarse BS b/l. Vent.  RRR  soft nt nd  no edema  warm perfused skin.  Reilly. NGT.      LABS:                        9.9    17.22 )-----------( 170      ( 07 Oct 2023 04:00 )             31.0      10-07    140  |  109<H>  |  9   ----------------------------<  148<H>  4.3   |  25  |  0.73    Ca    8.6      07 Oct 2023 04:00  Phos  1.9     10-07  Mg     2.7     10-07    TPro  6.6  /  Alb  2.7<L>  /  TBili  0.9  /  DBili  x   /  AST  41<H>  /  ALT  39  /  AlkPhos  70  10-07    PT/INR - ( 06 Oct 2023 05:30 )   PT: 12.9 sec;   INR: 1.08 ratio         PTT - ( 06 Oct 2023 05:30 )  PTT:30.6 sec  Urinalysis Basic - ( 07 Oct 2023 04:00 )    Color: x / Appearance: x / SG: x / pH: x  Gluc: 148 mg/dL / Ketone: x  / Bili: x / Urobili: x   Blood: x / Protein: x / Nitrite: x   Leuk Esterase: x / RBC: x / WBC x   Sq Epi: x / Non Sq Epi: x / Bacteria: x      Culture Results:   No growth at 24 hours (10-06 @ 05:30)  Culture Results:   No growth at 24 hours (10-06 @ 05:30)

## 2023-10-07 NOTE — CHART NOTE - NSCHARTNOTEFT_GEN_A_CORE
After PM ABG, PF ratio of 133 on 60% and paralyzed. Decision made to prone patient overnight for ARDs PNA. ACP/Attending/ RNs/Respiratory at bedside. Successfully proned patient at 1540. PF ration improved to 266. Will drop Fi02 to 50% and trend serial ABGs. Plan to supinate in AM. After PM ABG, PF ratio of 133 on 60% and paralyzed. Decision made to prone patient overnight for ARDs PNA. ACP/Attending/ RNs/Respiratory at bedside. Successfully proned patient at 1540. PF ration improved to 266. Will drop Fi02 to 50% and trend serial ABGs. Plan to supinate in AM.    Daughter and  updated. Collateral received from husbanding stating she was getting worked up for TB in home country in [place] years ago when she had a cough and CT showing pulmonary nodules but no diagnosis was given and never received meds. Has no symptoms since episode years ago. Denies weight loss, cough, or night sweats. 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

## 2023-10-07 NOTE — PROCEDURE NOTE - NSINDICATIONS_GEN_A_CORE
hypoxic to 80s on bipap 100% and breathing 40s/respiratory distress/respiratory failure
drainage
arterial puncture to obtain ABG's/blood sampling/cannulation purposes/critical patient/monitoring purposes
critical illness/venous access

## 2023-10-07 NOTE — PROCEDURE NOTE - NSPROCDETAILS_GEN_ALL_CORE
audible air bolus/placement confirmed by auscultation/orogastric/connected to suction
patient pre-oxygenated, tube inserted, placement confirmed
location identified, draped/prepped, sterile technique used, needle inserted/introduced/positive blood return obtained via catheter/connected to a pressurized flush line/sutured in place/hemostasis with direct pressure, dressing applied/Seldinger technique/all materials/supplies accounted for at end of procedure
guidewire recovered/lumen(s) aspirated and flushed/sterile dressing applied/sterile technique, catheter placed/ultrasound guidance with use of sterile gel and probe cove

## 2023-10-07 NOTE — PROGRESS NOTE ADULT - NSPROGADDITIONALINFOA_GEN_ALL_CORE
Critical Care time: 45 mins assessing presenting problems of acute illness that poses high probability of life threatening deterioration or end organ damage/dysfunction.  Medical decision making inculding Initiating plan of care, reviewing data, reviewing radiology,direct patient bedside evaluation and interpretation of vital signs, any necessary ventilator management , discussion with multidisciplinary team, discussing goals of care with patient/family, all non inclusive of procedures, COVID 19 specific considerations and therapeutic  options based on the available and rapidly changing literature

## 2023-10-08 LAB
ALBUMIN SERPL ELPH-MCNC: 2.6 G/DL — LOW (ref 3.3–5)
ALP SERPL-CCNC: 68 U/L — SIGNIFICANT CHANGE UP (ref 40–120)
ALT FLD-CCNC: 39 U/L — SIGNIFICANT CHANGE UP (ref 12–78)
ANION GAP SERPL CALC-SCNC: 3 MMOL/L — LOW (ref 5–17)
AST SERPL-CCNC: 24 U/L — SIGNIFICANT CHANGE UP (ref 15–37)
BASE EXCESS BLDA CALC-SCNC: 3.5 MMOL/L — HIGH (ref -2–3)
BASOPHILS # BLD AUTO: 0.02 K/UL — SIGNIFICANT CHANGE UP (ref 0–0.2)
BASOPHILS NFR BLD AUTO: 0.1 % — SIGNIFICANT CHANGE UP (ref 0–2)
BILIRUB SERPL-MCNC: 0.6 MG/DL — SIGNIFICANT CHANGE UP (ref 0.2–1.2)
BLOOD GAS COMMENTS ARTERIAL: SIGNIFICANT CHANGE UP
BUN SERPL-MCNC: 14 MG/DL — SIGNIFICANT CHANGE UP (ref 7–23)
CALCIUM SERPL-MCNC: 8.7 MG/DL — SIGNIFICANT CHANGE UP (ref 8.5–10.1)
CHLORIDE SERPL-SCNC: 108 MMOL/L — SIGNIFICANT CHANGE UP (ref 96–108)
CO2 BLDA-SCNC: 29 MMOL/L — HIGH (ref 19–24)
CO2 SERPL-SCNC: 29 MMOL/L — SIGNIFICANT CHANGE UP (ref 22–31)
CREAT SERPL-MCNC: 0.74 MG/DL — SIGNIFICANT CHANGE UP (ref 0.5–1.3)
CULTURE RESULTS: NO GROWTH — SIGNIFICANT CHANGE UP
EGFR: 96 ML/MIN/1.73M2 — SIGNIFICANT CHANGE UP
EOSINOPHIL # BLD AUTO: 0 K/UL — SIGNIFICANT CHANGE UP (ref 0–0.5)
EOSINOPHIL NFR BLD AUTO: 0 % — SIGNIFICANT CHANGE UP (ref 0–6)
GAS PNL BLDA: SIGNIFICANT CHANGE UP
GAS PNL BLDA: SIGNIFICANT CHANGE UP
GLUCOSE BLDC GLUCOMTR-MCNC: 102 MG/DL — HIGH (ref 70–99)
GLUCOSE BLDC GLUCOMTR-MCNC: 128 MG/DL — HIGH (ref 70–99)
GLUCOSE BLDC GLUCOMTR-MCNC: 129 MG/DL — HIGH (ref 70–99)
GLUCOSE BLDC GLUCOMTR-MCNC: 133 MG/DL — HIGH (ref 70–99)
GLUCOSE BLDC GLUCOMTR-MCNC: 138 MG/DL — HIGH (ref 70–99)
GLUCOSE SERPL-MCNC: 128 MG/DL — HIGH (ref 70–99)
HCO3 BLDA-SCNC: 28 MMOL/L — SIGNIFICANT CHANGE UP (ref 21–28)
HCT VFR BLD CALC: 30.7 % — LOW (ref 34.5–45)
HGB BLD-MCNC: 10.1 G/DL — LOW (ref 11.5–15.5)
HOROWITZ INDEX BLDA+IHG-RTO: 40 — SIGNIFICANT CHANGE UP
IMM GRANULOCYTES NFR BLD AUTO: 0.5 % — SIGNIFICANT CHANGE UP (ref 0–0.9)
LACTATE SERPL-SCNC: 1 MMOL/L — SIGNIFICANT CHANGE UP (ref 0.7–2)
LYMPHOCYTES # BLD AUTO: 1.04 K/UL — SIGNIFICANT CHANGE UP (ref 1–3.3)
LYMPHOCYTES # BLD AUTO: 5.4 % — LOW (ref 13–44)
MAGNESIUM SERPL-MCNC: 2.6 MG/DL — SIGNIFICANT CHANGE UP (ref 1.6–2.6)
MCHC RBC-ENTMCNC: 28.6 PG — SIGNIFICANT CHANGE UP (ref 27–34)
MCHC RBC-ENTMCNC: 32.9 G/DL — SIGNIFICANT CHANGE UP (ref 32–36)
MCV RBC AUTO: 87 FL — SIGNIFICANT CHANGE UP (ref 80–100)
MONOCYTES # BLD AUTO: 1.02 K/UL — HIGH (ref 0–0.9)
MONOCYTES NFR BLD AUTO: 5.3 % — SIGNIFICANT CHANGE UP (ref 2–14)
NEUTROPHILS # BLD AUTO: 17.16 K/UL — HIGH (ref 1.8–7.4)
NEUTROPHILS NFR BLD AUTO: 88.7 % — HIGH (ref 43–77)
NRBC # BLD: 0 /100 WBCS — SIGNIFICANT CHANGE UP (ref 0–0)
PCO2 BLDA: 40 MMHG — SIGNIFICANT CHANGE UP (ref 32–46)
PH BLDA: 7.45 — SIGNIFICANT CHANGE UP (ref 7.35–7.45)
PHOSPHATE SERPL-MCNC: 3 MG/DL — SIGNIFICANT CHANGE UP (ref 2.5–4.5)
PLATELET # BLD AUTO: 162 K/UL — SIGNIFICANT CHANGE UP (ref 150–400)
PO2 BLDA: 109 MMHG — HIGH (ref 83–108)
POTASSIUM SERPL-MCNC: 4.7 MMOL/L — SIGNIFICANT CHANGE UP (ref 3.5–5.3)
POTASSIUM SERPL-SCNC: 4.7 MMOL/L — SIGNIFICANT CHANGE UP (ref 3.5–5.3)
PROT SERPL-MCNC: 6.7 GM/DL — SIGNIFICANT CHANGE UP (ref 6–8.3)
RBC # BLD: 3.53 M/UL — LOW (ref 3.8–5.2)
RBC # FLD: 14.3 % — SIGNIFICANT CHANGE UP (ref 10.3–14.5)
SAO2 % BLDA: 98.6 % — HIGH (ref 94–98)
SODIUM SERPL-SCNC: 140 MMOL/L — SIGNIFICANT CHANGE UP (ref 135–145)
WBC # BLD: 19.34 K/UL — HIGH (ref 3.8–10.5)
WBC # FLD AUTO: 19.34 K/UL — HIGH (ref 3.8–10.5)

## 2023-10-08 PROCEDURE — 99291 CRITICAL CARE FIRST HOUR: CPT

## 2023-10-08 RX ORDER — IPRATROPIUM/ALBUTEROL SULFATE 18-103MCG
3 AEROSOL WITH ADAPTER (GRAM) INHALATION EVERY 6 HOURS
Refills: 0 | Status: DISCONTINUED | OUTPATIENT
Start: 2023-10-08 | End: 2023-10-18

## 2023-10-08 RX ADMIN — FENTANYL CITRATE 2.47 MICROGRAM(S)/KG/HR: 50 INJECTION INTRAVENOUS at 05:03

## 2023-10-08 RX ADMIN — PROPOFOL 5.93 MICROGRAM(S)/KG/MIN: 10 INJECTION, EMULSION INTRAVENOUS at 13:22

## 2023-10-08 RX ADMIN — PIPERACILLIN AND TAZOBACTAM 25 GRAM(S): 4; .5 INJECTION, POWDER, LYOPHILIZED, FOR SOLUTION INTRAVENOUS at 21:28

## 2023-10-08 RX ADMIN — PROPOFOL 5.93 MICROGRAM(S)/KG/MIN: 10 INJECTION, EMULSION INTRAVENOUS at 20:28

## 2023-10-08 RX ADMIN — PIPERACILLIN AND TAZOBACTAM 25 GRAM(S): 4; .5 INJECTION, POWDER, LYOPHILIZED, FOR SOLUTION INTRAVENOUS at 13:20

## 2023-10-08 RX ADMIN — Medication 3 MILLILITER(S): at 05:26

## 2023-10-08 RX ADMIN — Medication 3 MILLILITER(S): at 11:17

## 2023-10-08 RX ADMIN — AZITHROMYCIN 255 MILLIGRAM(S): 500 TABLET, FILM COATED ORAL at 05:05

## 2023-10-08 RX ADMIN — CHLORHEXIDINE GLUCONATE 1 APPLICATION(S): 213 SOLUTION TOPICAL at 05:06

## 2023-10-08 RX ADMIN — CHLORHEXIDINE GLUCONATE 15 MILLILITER(S): 213 SOLUTION TOPICAL at 05:05

## 2023-10-08 RX ADMIN — PANTOPRAZOLE SODIUM 40 MILLIGRAM(S): 20 TABLET, DELAYED RELEASE ORAL at 11:31

## 2023-10-08 RX ADMIN — PROPOFOL 5.93 MICROGRAM(S)/KG/MIN: 10 INJECTION, EMULSION INTRAVENOUS at 05:03

## 2023-10-08 RX ADMIN — PIPERACILLIN AND TAZOBACTAM 25 GRAM(S): 4; .5 INJECTION, POWDER, LYOPHILIZED, FOR SOLUTION INTRAVENOUS at 05:05

## 2023-10-08 RX ADMIN — FENTANYL CITRATE 2.47 MICROGRAM(S)/KG/HR: 50 INJECTION INTRAVENOUS at 07:45

## 2023-10-08 RX ADMIN — Medication 4.63 MICROGRAM(S)/KG/MIN: at 17:00

## 2023-10-08 RX ADMIN — CHLORHEXIDINE GLUCONATE 15 MILLILITER(S): 213 SOLUTION TOPICAL at 17:17

## 2023-10-08 RX ADMIN — Medication 3 MILLILITER(S): at 00:59

## 2023-10-08 RX ADMIN — HEPARIN SODIUM 5000 UNIT(S): 5000 INJECTION INTRAVENOUS; SUBCUTANEOUS at 21:31

## 2023-10-08 RX ADMIN — HEPARIN SODIUM 5000 UNIT(S): 5000 INJECTION INTRAVENOUS; SUBCUTANEOUS at 05:06

## 2023-10-08 RX ADMIN — Medication 102 MILLIGRAM(S): at 05:05

## 2023-10-08 RX ADMIN — HEPARIN SODIUM 5000 UNIT(S): 5000 INJECTION INTRAVENOUS; SUBCUTANEOUS at 13:22

## 2023-10-08 NOTE — PROGRESS NOTE ADULT - ASSESSMENT
53 year old female with acute hypoxic respiratory failure secondary to PNA now in ARDS and septic shock requiring IV pressors s/p prone.     [employer].  LTVV. ARDSnet  supine at 9:30am. would consider reproning later this afternoon.  Decadron 10mg for severe ARDS  levophed titration for shock. Trend Cardiac enzymes. trend EKG.  GI ppx. TF  Strict I/O's. Diuresis prn.  dvt ppx.  abx for pneumonia.  Trend ABG  critically ill.

## 2023-10-08 NOTE — PROGRESS NOTE ADULT - SUBJECTIVE AND OBJECTIVE BOX
INTERVAL HPI/OVERNIGHT EVENTS:   HPI:  53 years old female with no significant PMH present to ED with complain of worsening SOB for 1 day. Patietn reported cough for 3-4 days, fever for 1 day and worsening SOB for 1 day. No nausea, vomiting or diarrhea. No known sick exposure.  Hypoxic to 69 % at RA, require nonrebreather, tachycardic. WBC 16.81, plt 218, ddimer 282, K 3.4, Cr 0.85, lactate 3, hsTnT 107.4, proBNP 344. CTA chest with no PE. Bilateral lung opacities. Bronchiectasis.     SH: no toxic habits  FH: HTN (06 Oct 2023 11:25)      CENTRAL LINE: [ ] YES [ ] NO  LOCATION:   DATE INSERTED:  REMOVE: [ ] YES [ ] NO  EXPLAIN:    CARRIZALES: [ ] YES [ ] NO    DATE INSERTED:  REMOVE:  [ ] YES [ ] NO  EXPLAIN:    A-LINE:  [ ] YES [ ] NO  LOCATION:   DATE INSERTED:  REMOVE:  [ ] YES [ ] NO  EXPLAIN:    PAST MEDICAL & SURGICAL HISTORY:  No pertinent past medical history      No significant past surgical history          REVIEW OF SYSTEMS:    Negative ROS aside from HPI/Interval events above.    ICU Vital Signs Last 24 Hrs  T(C): 36.9 (08 Oct 2023 07:00), Max: 37.5 (07 Oct 2023 15:00)  T(F): 98.4 (08 Oct 2023 07:00), Max: 99.5 (07 Oct 2023 15:00)  HR: 67 (08 Oct 2023 08:20) (54 - 87)  BP: 94/65 (07 Oct 2023 15:00) (80/58 - 96/72)  BP(mean): 75 (07 Oct 2023 15:00) (64 - 82)  ABP: 128/74 (08 Oct 2023 08:00) (98/63 - 151/83)  ABP(mean): 94 (08 Oct 2023 08:00) (77 - 109)  RR: 24 (08 Oct 2023 08:00) (19 - 26)  SpO2: 99% (08 Oct 2023 08:20) (96% - 100%)    O2 Parameters below as of 08 Oct 2023 08:00  Patient On (Oxygen Delivery Method): ventilator    O2 Concentration (%): 40        ABG - ( 08 Oct 2023 08:10 )  pH, Arterial: 7.41  pH, Blood: x     /  pCO2: 43    /  pO2: 85    / HCO3: 27    / Base Excess: 2.3   /  SaO2: 97.8                I&O's Detail    07 Oct 2023 07:01  -  08 Oct 2023 07:00  --------------------------------------------------------  IN:    Cisatracurium: 179.2 mL    FentaNYL: 313.8 mL    IV PiggyBack: 300 mL    IV PiggyBack: 300 mL    Norepinephrine: 437.1 mL    Propofol: 257.7 mL    Vital1.0: 120 mL  Total IN: 1907.8 mL    OUT:    Indwelling Catheter - Urethral (mL): 2070 mL    Voided (mL): 350 mL  Total OUT: 2420 mL    Total NET: -512.2 mL      08 Oct 2023 07:01  -  08 Oct 2023 09:48  --------------------------------------------------------  IN:    Cisatracurium: 7.4 mL    FentaNYL: 12.3 mL    Norepinephrine: 18.5 mL    Propofol: 10.3 mL    Vital1.0: 10 mL  Total IN: 58.5 mL    OUT:  Total OUT: 0 mL    Total NET: 58.5 mL      Mode: AC/ CMV (Assist Control/ Continuous Mandatory Ventilation)  RR (machine): 24  TV (machine): 300  FiO2: 40  PEEP: 5  ITime: 1  MAP: 12  PIP: 33    CAPILLARY BLOOD GLUCOSE      POCT Blood Glucose.: 138 mg/dL (08 Oct 2023 06:01)  POCT Blood Glucose.: 133 mg/dL (08 Oct 2023 00:18)  POCT Blood Glucose.: 132 mg/dL (07 Oct 2023 17:45)  POCT Blood Glucose.: 126 mg/dL (07 Oct 2023 12:10)        PHYSICAL EXAM:    sedated. paralyzed.  currently proned.  no edema.  skin warm perfused.  coarse vent BS b/l.      LABS:                        10.1   19.34 )-----------( 162      ( 08 Oct 2023 03:10 )             30.7      10-08    140  |  108  |  14  ----------------------------<  128<H>  4.7   |  29  |  0.74    Ca    8.7      08 Oct 2023 03:10  Phos  3.0     10-08  Mg     2.6     10-08    TPro  6.7  /  Alb  2.6<L>  /  TBili  0.6  /  DBili  x   /  AST  24  /  ALT  39  /  AlkPhos  68  10-08      Urinalysis Basic - ( 08 Oct 2023 03:10 )    Color: x / Appearance: x / SG: x / pH: x  Gluc: 128 mg/dL / Ketone: x  / Bili: x / Urobili: x   Blood: x / Protein: x / Nitrite: x   Leuk Esterase: x / RBC: x / WBC x   Sq Epi: x / Non Sq Epi: x / Bacteria: x      Culture Results:   <10,000 CFU/mL Normal Urogenital Lolis (10-06 @ 10:25)  Culture Results:   No growth at 48 Hours (10-06 @ 05:30)  Culture Results:   No growth at 48 Hours (10-06 @ 05:30)

## 2023-10-09 LAB
ALBUMIN SERPL ELPH-MCNC: 2.4 G/DL — LOW (ref 3.3–5)
ALP SERPL-CCNC: 70 U/L — SIGNIFICANT CHANGE UP (ref 40–120)
ALT FLD-CCNC: 53 U/L — SIGNIFICANT CHANGE UP (ref 12–78)
ANA PAT FLD IF-IMP: ABNORMAL
ANA PAT FLD IF-IMP: ABNORMAL
ANA TITR SER: ABNORMAL
ANA TITR SER: ABNORMAL
ANION GAP SERPL CALC-SCNC: 6 MMOL/L — SIGNIFICANT CHANGE UP (ref 5–17)
AST SERPL-CCNC: 51 U/L — HIGH (ref 15–37)
BASE EXCESS BLDA CALC-SCNC: 3.8 MMOL/L — HIGH (ref -2–3)
BASOPHILS # BLD AUTO: 0.01 K/UL — SIGNIFICANT CHANGE UP (ref 0–0.2)
BASOPHILS NFR BLD AUTO: 0.1 % — SIGNIFICANT CHANGE UP (ref 0–2)
BILIRUB SERPL-MCNC: 0.5 MG/DL — SIGNIFICANT CHANGE UP (ref 0.2–1.2)
BLOOD GAS COMMENTS ARTERIAL: SIGNIFICANT CHANGE UP
BUN SERPL-MCNC: 18 MG/DL — SIGNIFICANT CHANGE UP (ref 7–23)
CALCIUM SERPL-MCNC: 8.5 MG/DL — SIGNIFICANT CHANGE UP (ref 8.5–10.1)
CHLORIDE SERPL-SCNC: 108 MMOL/L — SIGNIFICANT CHANGE UP (ref 96–108)
CO2 BLDA-SCNC: 31 MMOL/L — HIGH (ref 19–24)
CO2 SERPL-SCNC: 28 MMOL/L — SIGNIFICANT CHANGE UP (ref 22–31)
CREAT SERPL-MCNC: 0.65 MG/DL — SIGNIFICANT CHANGE UP (ref 0.5–1.3)
EGFR: 105 ML/MIN/1.73M2 — SIGNIFICANT CHANGE UP
EOSINOPHIL # BLD AUTO: 0 K/UL — SIGNIFICANT CHANGE UP (ref 0–0.5)
EOSINOPHIL NFR BLD AUTO: 0 % — SIGNIFICANT CHANGE UP (ref 0–6)
GAS PNL BLDA: SIGNIFICANT CHANGE UP
GLUCOSE BLDC GLUCOMTR-MCNC: 105 MG/DL — HIGH (ref 70–99)
GLUCOSE BLDC GLUCOMTR-MCNC: 109 MG/DL — HIGH (ref 70–99)
GLUCOSE BLDC GLUCOMTR-MCNC: 86 MG/DL — SIGNIFICANT CHANGE UP (ref 70–99)
GLUCOSE BLDC GLUCOMTR-MCNC: 98 MG/DL — SIGNIFICANT CHANGE UP (ref 70–99)
GLUCOSE SERPL-MCNC: 109 MG/DL — HIGH (ref 70–99)
HCO3 BLDA-SCNC: 29 MMOL/L — HIGH (ref 21–28)
HCT VFR BLD CALC: 30.4 % — LOW (ref 34.5–45)
HGB BLD-MCNC: 9.7 G/DL — LOW (ref 11.5–15.5)
HOROWITZ INDEX BLDA+IHG-RTO: 40 — SIGNIFICANT CHANGE UP
IMM GRANULOCYTES NFR BLD AUTO: 0.7 % — SIGNIFICANT CHANGE UP (ref 0–0.9)
LYMPHOCYTES # BLD AUTO: 12.5 % — LOW (ref 13–44)
LYMPHOCYTES # BLD AUTO: 2.1 K/UL — SIGNIFICANT CHANGE UP (ref 1–3.3)
MAGNESIUM SERPL-MCNC: 2.1 MG/DL — SIGNIFICANT CHANGE UP (ref 1.6–2.6)
MCHC RBC-ENTMCNC: 27.7 PG — SIGNIFICANT CHANGE UP (ref 27–34)
MCHC RBC-ENTMCNC: 31.9 G/DL — LOW (ref 32–36)
MCV RBC AUTO: 86.9 FL — SIGNIFICANT CHANGE UP (ref 80–100)
MONOCYTES # BLD AUTO: 0.85 K/UL — SIGNIFICANT CHANGE UP (ref 0–0.9)
MONOCYTES NFR BLD AUTO: 5 % — SIGNIFICANT CHANGE UP (ref 2–14)
NEUTROPHILS # BLD AUTO: 13.76 K/UL — HIGH (ref 1.8–7.4)
NEUTROPHILS NFR BLD AUTO: 81.7 % — HIGH (ref 43–77)
NRBC # BLD: 0 /100 WBCS — SIGNIFICANT CHANGE UP (ref 0–0)
PCO2 BLDA: 46 MMHG — SIGNIFICANT CHANGE UP (ref 32–46)
PH BLDA: 7.41 — SIGNIFICANT CHANGE UP (ref 7.35–7.45)
PHOSPHATE SERPL-MCNC: 3.1 MG/DL — SIGNIFICANT CHANGE UP (ref 2.5–4.5)
PLATELET # BLD AUTO: 179 K/UL — SIGNIFICANT CHANGE UP (ref 150–400)
PO2 BLDA: 79 MMHG — LOW (ref 83–108)
POTASSIUM SERPL-MCNC: 4.4 MMOL/L — SIGNIFICANT CHANGE UP (ref 3.5–5.3)
POTASSIUM SERPL-SCNC: 4.4 MMOL/L — SIGNIFICANT CHANGE UP (ref 3.5–5.3)
PROT SERPL-MCNC: 6.2 GM/DL — SIGNIFICANT CHANGE UP (ref 6–8.3)
RBC # BLD: 3.5 M/UL — LOW (ref 3.8–5.2)
RBC # FLD: 14.2 % — SIGNIFICANT CHANGE UP (ref 10.3–14.5)
SAO2 % BLDA: 96.4 % — SIGNIFICANT CHANGE UP (ref 94–98)
SODIUM SERPL-SCNC: 142 MMOL/L — SIGNIFICANT CHANGE UP (ref 135–145)
WBC # BLD: 16.84 K/UL — HIGH (ref 3.8–10.5)
WBC # FLD AUTO: 16.84 K/UL — HIGH (ref 3.8–10.5)

## 2023-10-09 PROCEDURE — 99291 CRITICAL CARE FIRST HOUR: CPT

## 2023-10-09 RX ORDER — FENTANYL CITRATE 50 UG/ML
25 INJECTION INTRAVENOUS EVERY 4 HOURS
Refills: 0 | Status: DISCONTINUED | OUTPATIENT
Start: 2023-10-09 | End: 2023-10-09

## 2023-10-09 RX ORDER — CHLORHEXIDINE GLUCONATE 213 G/1000ML
15 SOLUTION TOPICAL EVERY 12 HOURS
Refills: 0 | Status: DISCONTINUED | OUTPATIENT
Start: 2023-10-09 | End: 2023-10-09

## 2023-10-09 RX ORDER — FUROSEMIDE 40 MG
20 TABLET ORAL ONCE
Refills: 0 | Status: COMPLETED | OUTPATIENT
Start: 2023-10-09 | End: 2023-10-09

## 2023-10-09 RX ORDER — DEXMEDETOMIDINE HYDROCHLORIDE IN 0.9% SODIUM CHLORIDE 4 UG/ML
0.3 INJECTION INTRAVENOUS
Qty: 200 | Refills: 0 | Status: DISCONTINUED | OUTPATIENT
Start: 2023-10-09 | End: 2023-10-09

## 2023-10-09 RX ADMIN — CHLORHEXIDINE GLUCONATE 1 APPLICATION(S): 213 SOLUTION TOPICAL at 06:38

## 2023-10-09 RX ADMIN — FENTANYL CITRATE 2.47 MICROGRAM(S)/KG/HR: 50 INJECTION INTRAVENOUS at 03:01

## 2023-10-09 RX ADMIN — HEPARIN SODIUM 5000 UNIT(S): 5000 INJECTION INTRAVENOUS; SUBCUTANEOUS at 06:31

## 2023-10-09 RX ADMIN — FENTANYL CITRATE 2.47 MICROGRAM(S)/KG/HR: 50 INJECTION INTRAVENOUS at 07:36

## 2023-10-09 RX ADMIN — ONDANSETRON 4 MILLIGRAM(S): 8 TABLET, FILM COATED ORAL at 14:05

## 2023-10-09 RX ADMIN — Medication 102 MILLIGRAM(S): at 06:38

## 2023-10-09 RX ADMIN — CHLORHEXIDINE GLUCONATE 15 MILLILITER(S): 213 SOLUTION TOPICAL at 07:20

## 2023-10-09 RX ADMIN — DEXMEDETOMIDINE HYDROCHLORIDE IN 0.9% SODIUM CHLORIDE 3.71 MICROGRAM(S)/KG/HR: 4 INJECTION INTRAVENOUS at 11:11

## 2023-10-09 RX ADMIN — HEPARIN SODIUM 5000 UNIT(S): 5000 INJECTION INTRAVENOUS; SUBCUTANEOUS at 13:08

## 2023-10-09 RX ADMIN — PANTOPRAZOLE SODIUM 40 MILLIGRAM(S): 20 TABLET, DELAYED RELEASE ORAL at 11:11

## 2023-10-09 RX ADMIN — PIPERACILLIN AND TAZOBACTAM 25 GRAM(S): 4; .5 INJECTION, POWDER, LYOPHILIZED, FOR SOLUTION INTRAVENOUS at 21:21

## 2023-10-09 RX ADMIN — FENTANYL CITRATE 25 MICROGRAM(S): 50 INJECTION INTRAVENOUS at 13:30

## 2023-10-09 RX ADMIN — DEXMEDETOMIDINE HYDROCHLORIDE IN 0.9% SODIUM CHLORIDE 3.71 MICROGRAM(S)/KG/HR: 4 INJECTION INTRAVENOUS at 14:34

## 2023-10-09 RX ADMIN — HEPARIN SODIUM 5000 UNIT(S): 5000 INJECTION INTRAVENOUS; SUBCUTANEOUS at 21:21

## 2023-10-09 RX ADMIN — PIPERACILLIN AND TAZOBACTAM 25 GRAM(S): 4; .5 INJECTION, POWDER, LYOPHILIZED, FOR SOLUTION INTRAVENOUS at 13:07

## 2023-10-09 RX ADMIN — Medication 4.63 MICROGRAM(S)/KG/MIN: at 07:36

## 2023-10-09 RX ADMIN — PROPOFOL 5.93 MICROGRAM(S)/KG/MIN: 10 INJECTION, EMULSION INTRAVENOUS at 06:32

## 2023-10-09 RX ADMIN — PROPOFOL 5.93 MICROGRAM(S)/KG/MIN: 10 INJECTION, EMULSION INTRAVENOUS at 07:36

## 2023-10-09 RX ADMIN — DEXMEDETOMIDINE HYDROCHLORIDE IN 0.9% SODIUM CHLORIDE 3.71 MICROGRAM(S)/KG/HR: 4 INJECTION INTRAVENOUS at 08:07

## 2023-10-09 RX ADMIN — FENTANYL CITRATE 25 MICROGRAM(S): 50 INJECTION INTRAVENOUS at 13:07

## 2023-10-09 RX ADMIN — Medication 20 MILLIGRAM(S): at 15:02

## 2023-10-09 RX ADMIN — PIPERACILLIN AND TAZOBACTAM 25 GRAM(S): 4; .5 INJECTION, POWDER, LYOPHILIZED, FOR SOLUTION INTRAVENOUS at 06:31

## 2023-10-09 NOTE — DIETITIAN INITIAL EVALUATION ADULT - NSFNSGIIOFT_GEN_A_CORE
10-08-23 @ 07:01  -  10-09-23 @ 07:00  --------------------------------------------------------  OUT:  Total OUT: 0 mL    Total NET: 220 mL      10-09-23 @ 07:01  -  10-09-23 @ 15:16  --------------------------------------------------------  OUT:  Total OUT: 0 mL    Total NET: 50 mL

## 2023-10-09 NOTE — DIETITIAN INITIAL EVALUATION ADULT - DIET TYPE
beside swallow evaluation, if without altered swallow; advance diet when appropriate from clear fluid-> regular diet

## 2023-10-09 NOTE — PROGRESS NOTE ADULT - SUBJECTIVE AND OBJECTIVE BOX
HPI:  53 years old female with no significant PMH present to ED with complain of worsening SOB for 1 day. Patietn reported cough for 3-4 days, fever for 1 day and worsening SOB for 1 day. No nausea, vomiting or diarrhea. No known sick exposure.  Hypoxic to 69 % at RA, require nonrebreather, tachycardic. WBC 16.81, plt 218, ddimer 282, K 3.4, Cr 0.85, lactate 3, hsTnT 107.4, proBNP 344. CTA chest with no PE. Bilateral lung opacities. Bronchiectasis.     SH: no toxic habits  FH: HTN (06 Oct 2023 11:25)      24 hr events:      ## Labs:  CBC:                        9.7    16.84 )-----------( 179      ( 09 Oct 2023 04:55 )             30.4     Chem:  10-09    142  |  108  |  18  ----------------------------<  109<H>  4.4   |  28  |  0.65    Ca    8.5      09 Oct 2023 04:55  Phos  3.1     10-09  Mg     2.1     10-09    TPro  6.2  /  Alb  2.4<L>  /  TBili  0.5  /  DBili  x   /  AST  51<H>  /  ALT  53  /  AlkPhos  70  10-09    Coags:          ## Imaging:    ## Medications:  piperacillin/tazobactam IVPB.. 3.375 Gram(s) IV Intermittent every 8 hours      albuterol/ipratropium for Nebulization 3 milliLiter(s) Nebulizer every 6 hours PRN    insulin lispro (ADMELOG) corrective regimen sliding scale   SubCutaneous every 6 hours    heparin   Injectable 5000 Unit(s) SubCutaneous every 8 hours    pantoprazole  Injectable 40 milliGRAM(s) IV Push daily    acetaminophen     Tablet .. 650 milliGRAM(s) Oral every 6 hours PRN  dexMEDEtomidine Infusion 0.3 MICROgram(s)/kG/Hr IV Continuous <Continuous>  ondansetron Injectable 4 milliGRAM(s) IV Push every 8 hours PRN  propofol Infusion 20 MICROgram(s)/kG/Min IV Continuous <Continuous>      ## Vitals:  T(C): 37.5 (10-09-23 @ 10:30), Max: 37.5 (10-09-23 @ 10:00)  HR: 92 (10-09-23 @ 11:27) (50 - 121)  BP: --  BP(mean): --  RR: 17 (10-09-23 @ 10:30) (13 - 24)  SpO2: 97% (10-09-23 @ 11:27) (92% - 100%)  Wt(kg): --  Vent: Mode: CPAP with PS, RR (patient): 21, FiO2: 40, PEEP: 5, PS: 8, PIP: 13  ABG: ABG - ( 09 Oct 2023 09:50 )  pH, Arterial: 7.41  pH, Blood: x     /  pCO2: 46    /  pO2: 79    / HCO3: 29    / Base Excess: 3.8   /  SaO2: 96.4                  10-08 @ 07:01  -  10-09 @ 07:00  --------------------------------------------------------  IN: 1440.4 mL / OUT: 1390 mL / NET: 50.4 mL    10-09 @ 07:01  -  10-09 @ 11:38  --------------------------------------------------------  IN: 42.4 mL / OUT: 200 mL / NET: -157.6 mL          ## P/E:  Gen: lying comfortably in bed in no apparent distress  Mouth: (+) ETT/OGT  Neck: L IJ TLC  Lungs:   Heart:   Abd:  Ext:  Neuro:    CENTRAL LINE: [ ] YES [ ] NO  LOCATION:   DATE INSERTED:  REMOVE: [ ] YES [ ] NO      CARRIZALES: [ ] YES [ ] NO    DATE INSERTED:  REMOVE:  [ ] YES [ ] NO      A-LINE:  [ ] YES [ ] NO  LOCATION:   DATE INSERTED:  REMOVE:  [ ] YES [ ] NO  EXPLAIN:    GLOBAL ISSUE/BEST PRACTICE:  Analgesia:  Sedation:  HOB elevation: yes  Stress ulcer prophylaxis:  VTE prophylaxis:  Oral Care:  Glycemic control:  Nutrition:    CODE STATUS: [ ] full code  [ ] DNR  [ ] DNI  [ ] MOLST  Goals of care discussion: [ ] yes  HPI:  Pt is a 52 yo BF with h/o poss RA presented with AHRF 2 to PNA. On day of admission pt deteriorated and had RRT; pt intubated and pt transferred to ICU. CT chest showed severe GGO/consolidations and bronchiectasis. Pt meet criteria for ARDS; s/p proning. Currently oxygenation improving      ## Labs:  CBC:                        9.7    16.84 )-----------( 179      ( 09 Oct 2023 04:55 )             30.4     Chem:  10-09    142  |  108  |  18  ----------------------------<  109<H>  4.4   |  28  |  0.65    Ca    8.5      09 Oct 2023 04:55  Phos  3.1     10-09  Mg     2.1     10-09    TPro  6.2  /  Alb  2.4<L>  /  TBili  0.5  /  DBili  x   /  AST  51<H>  /  ALT  53  /  AlkPhos  70  10-09    Coags:          ## Imaging:    ## Medications:  piperacillin/tazobactam IVPB.. 3.375 Gram(s) IV Intermittent every 8 hours      albuterol/ipratropium for Nebulization 3 milliLiter(s) Nebulizer every 6 hours PRN    insulin lispro (ADMELOG) corrective regimen sliding scale   SubCutaneous every 6 hours    heparin   Injectable 5000 Unit(s) SubCutaneous every 8 hours    pantoprazole  Injectable 40 milliGRAM(s) IV Push daily    acetaminophen     Tablet .. 650 milliGRAM(s) Oral every 6 hours PRN  dexMEDEtomidine Infusion 0.3 MICROgram(s)/kG/Hr IV Continuous <Continuous>  ondansetron Injectable 4 milliGRAM(s) IV Push every 8 hours PRN  propofol Infusion 20 MICROgram(s)/kG/Min IV Continuous <Continuous>      ## Vitals:  T(C): 37.5 (10-09-23 @ 10:30), Max: 37.5 (10-09-23 @ 10:00)  HR: 92 (10-09-23 @ 11:27) (50 - 121)  BP: --  BP(mean): --  RR: 17 (10-09-23 @ 10:30) (13 - 24)  SpO2: 97% (10-09-23 @ 11:27) (92% - 100%)  Wt(kg): --  Vent: Mode: CPAP with PS, RR (patient): 21, FiO2: 40, PEEP: 5, PS: 8, PIP: 13  ABG: ABG - ( 09 Oct 2023 09:50 )  pH, Arterial: 7.41  pH, Blood: x     /  pCO2: 46    /  pO2: 79    / HCO3: 29    / Base Excess: 3.8   /  SaO2: 96.4                  10-08 @ 07:01  -  10-09 @ 07:00  --------------------------------------------------------  IN: 1440.4 mL / OUT: 1390 mL / NET: 50.4 mL    10-09 @ 07:01  -  10-09 @ 11:38  --------------------------------------------------------  IN: 42.4 mL / OUT: 200 mL / NET: -157.6 mL          ## P/E:  Gen: lying comfortably in bed in no apparent distress  Mouth: (+) ETT/OGT  Neck: L IJ TLC  Lungs: CTA  Heart: Tachy  Abd: Soft/+BS/ Non-tender  Ext: L hand edema/ R radialA-line  Neuro: Lightly sedated    CENTRAL LINE: [ ] YES [ ] NO  LOCATION:   DATE INSERTED:  REMOVE: [ ] YES [ ] NO      CARRIZALES: [ x] YES [ ] NO    DATE INSERTED:  REMOVE:  [ ] YES [ ] NO      A-LINE:  [ ] YES [ ] NO  LOCATION:   DATE INSERTED:  REMOVE:  [ ] YES [ ] NO  EXPLAIN:    GLOBAL ISSUE/BEST PRACTICE:  Analgesia:  Sedation:  HOB elevation: yes  Stress ulcer prophylaxis:  VTE prophylaxis:  Oral Care:  Glycemic control:  Nutrition:    CODE STATUS: [ ] full code  [ ] DNR  [ ] DNI  [ ] MOLST  Goals of care discussion: [ ] yes

## 2023-10-09 NOTE — DIETITIAN INITIAL EVALUATION ADULT - ORAL INTAKE PTA/DIET HISTORY
Pt s/p extubation, was unable to speak @ present, but answered some questions along c her daughter was @ bedside.  Pt lived c spouse, they both did the shopping/cooking.  Diet PTA: unrestricted, food allergies to preservatives, chips, skin of the apple, able to eat peeled apple, strawberries, tomatoes, chocolate reported c reaction of itchy throat and cough(allergies noted, Food and Nutrition Service made aware).  Pt without report of altered chew/swallow PTA. Pt s/p extubation, was unable to speak @ present, but answered some questions along c her daughter was @ bedside.  Pt lived c spouse, they both did the shopping/cooking.  Diet PTA: unrestricted, food allergies to preservatives, chips, skin of the apple, able to eat peeled apple, strawberries, tomatoes, chocolate & grainy consistency foods reported c reaction of itchy throat and cough(allergies noted, Food and Nutrition Service made aware).  Pt without report of altered chew/swallow PTA.

## 2023-10-09 NOTE — DIETITIAN INITIAL EVALUATION ADULT - OTHER INFO
Pt s/p trickle feeding enteral feeding regimen c Vital HP @ 10 ml/hr which was d/c'd after extubation today.

## 2023-10-09 NOTE — PROGRESS NOTE ADULT - ASSESSMENT
Pt is a 54 yo BF with h/o poss RA presented with AHRF 2 to PNA. On day of admission pt deteriorated and had RRT; pt intubated and pt transferred to ICU. CT chest showed severe GGO/consolidations and bronchiectasis. Pt meet criteria for ARDS; s/p proning. Currently oxygenation improved    Resp: Aggressive daily SBT  ID: Cx NTD; finish course of Zosyn  CVS: Off pressors  Heme: DVT prophylaxis with sq Heparin  FEN: Cont enteral feeds  Endo: Follow FS and cover with Lispro  Neuro: Cont light sedation with Precedex  Social:  at bedside and updated/ Pt is full code

## 2023-10-09 NOTE — DIETITIAN INITIAL EVALUATION ADULT - PERTINENT MEDS FT
MEDICATIONS  (STANDING):  chlorhexidine 2% Cloths 1 Application(s) Topical <User Schedule>  heparin   Injectable 5000 Unit(s) SubCutaneous every 8 hours  insulin lispro (ADMELOG) corrective regimen sliding scale   SubCutaneous every 6 hours  pantoprazole  Injectable 40 milliGRAM(s) IV Push daily  piperacillin/tazobactam IVPB.. 3.375 Gram(s) IV Intermittent every 8 hours    MEDICATIONS  (PRN):  acetaminophen     Tablet .. 650 milliGRAM(s) Oral every 6 hours PRN Temp greater or equal to 38C (100.4F), Mild Pain (1 - 3), Moderate Pain (4 - 6)  albuterol/ipratropium for Nebulization 3 milliLiter(s) Nebulizer every 6 hours PRN Shortness of Breath and/or Wheezing  ondansetron Injectable 4 milliGRAM(s) IV Push every 8 hours PRN Nausea and/or Vomiting

## 2023-10-09 NOTE — DIETITIAN INITIAL EVALUATION ADULT - PERTINENT LABORATORY DATA
10-09    142  |  108  |  18  ----------------------------<  109<H>  4.4   |  28  |  0.65    Ca    8.5      09 Oct 2023 04:55  Phos  3.1     10-09  Mg     2.1     10-09    TPro  6.2  /  Alb  2.4<L>  /  TBili  0.5  /  DBili  x   /  AST  51<H>  /  ALT  53  /  AlkPhos  70  10-09  POCT Blood Glucose.: 105 mg/dL (10-09-23 @ 11:11)  A1C with Estimated Average Glucose Result: 5.2 % (10-07-23 @ 04:00)  10/07, Thyroid stimulating Hormone 0.176 uU/ml <L>

## 2023-10-10 LAB
ALBUMIN SERPL ELPH-MCNC: 2.7 G/DL — LOW (ref 3.3–5)
ALP SERPL-CCNC: 91 U/L — SIGNIFICANT CHANGE UP (ref 40–120)
ALT FLD-CCNC: 53 U/L — SIGNIFICANT CHANGE UP (ref 12–78)
ANION GAP SERPL CALC-SCNC: 6 MMOL/L — SIGNIFICANT CHANGE UP (ref 5–17)
APTT 50/50 2HOUR INCUB: 38.9 SEC — HIGH (ref 24.5–36.6)
APTT BLD: 37.9 SEC — HIGH (ref 24.5–36.6)
APTT BLD: 48.1 SEC — HIGH (ref 24.5–35.6)
APTT BLD: 54.1 SEC — HIGH (ref 24.5–35.6)
AST SERPL-CCNC: 47 U/L — HIGH (ref 15–37)
AUTO DIFF PNL BLD: ABNORMAL
AUTO DIFF PNL BLD: ABNORMAL
BASOPHILS # BLD AUTO: 0.04 K/UL — SIGNIFICANT CHANGE UP (ref 0–0.2)
BASOPHILS NFR BLD AUTO: 0.3 % — SIGNIFICANT CHANGE UP (ref 0–2)
BILIRUB SERPL-MCNC: 0.9 MG/DL — SIGNIFICANT CHANGE UP (ref 0.2–1.2)
BUN SERPL-MCNC: 22 MG/DL — SIGNIFICANT CHANGE UP (ref 7–23)
C-ANCA SER-ACNC: NEGATIVE — SIGNIFICANT CHANGE UP
C-ANCA SER-ACNC: NEGATIVE — SIGNIFICANT CHANGE UP
CALCIUM SERPL-MCNC: 9 MG/DL — SIGNIFICANT CHANGE UP (ref 8.5–10.1)
CCP IGG SERPL-ACNC: 44 UNITS — HIGH
CHLORIDE SERPL-SCNC: 101 MMOL/L — SIGNIFICANT CHANGE UP (ref 96–108)
CO2 SERPL-SCNC: 33 MMOL/L — HIGH (ref 22–31)
CREAT SERPL-MCNC: 0.84 MG/DL — SIGNIFICANT CHANGE UP (ref 0.5–1.3)
DRVVT RATIO: 1.21 RATIO — SIGNIFICANT CHANGE UP (ref 0–1.21)
DRVVT SCREEN TO CONFIRM RATIO: SIGNIFICANT CHANGE UP
EGFR: 83 ML/MIN/1.73M2 — SIGNIFICANT CHANGE UP
EOSINOPHIL # BLD AUTO: 0 K/UL — SIGNIFICANT CHANGE UP (ref 0–0.5)
EOSINOPHIL NFR BLD AUTO: 0 % — SIGNIFICANT CHANGE UP (ref 0–6)
GAMMA INTERFERON BACKGROUND BLD IA-ACNC: 0.03 IU/ML — SIGNIFICANT CHANGE UP
GAMMA INTERFERON BACKGROUND BLD IA-ACNC: 0.03 IU/ML — SIGNIFICANT CHANGE UP
GLUCOSE BLDC GLUCOMTR-MCNC: 77 MG/DL — SIGNIFICANT CHANGE UP (ref 70–99)
GLUCOSE BLDC GLUCOMTR-MCNC: 80 MG/DL — SIGNIFICANT CHANGE UP (ref 70–99)
GLUCOSE SERPL-MCNC: 67 MG/DL — LOW (ref 70–99)
HCT VFR BLD CALC: 32.2 % — LOW (ref 34.5–45)
HGB BLD-MCNC: 10.5 G/DL — LOW (ref 11.5–15.5)
IMM GRANULOCYTES NFR BLD AUTO: 1.6 % — HIGH (ref 0–0.9)
LYMPHOCYTES # BLD AUTO: 15.1 % — SIGNIFICANT CHANGE UP (ref 13–44)
LYMPHOCYTES # BLD AUTO: 2.03 K/UL — SIGNIFICANT CHANGE UP (ref 1–3.3)
M PNEUMO IGM SER-ACNC: 0.82 INDEX — SIGNIFICANT CHANGE UP (ref 0–0.9)
M TB IFN-G BLD-IMP: ABNORMAL
M TB IFN-G BLD-IMP: ABNORMAL
M TB IFN-G CD4+ BCKGRND COR BLD-ACNC: -0.01 IU/ML — SIGNIFICANT CHANGE UP
M TB IFN-G CD4+ BCKGRND COR BLD-ACNC: -0.01 IU/ML — SIGNIFICANT CHANGE UP
M TB IFN-G CD4+CD8+ BCKGRND COR BLD-ACNC: -0.01 IU/ML — SIGNIFICANT CHANGE UP
M TB IFN-G CD4+CD8+ BCKGRND COR BLD-ACNC: -0.01 IU/ML — SIGNIFICANT CHANGE UP
MAGNESIUM SERPL-MCNC: 2.2 MG/DL — SIGNIFICANT CHANGE UP (ref 1.6–2.6)
MCHC RBC-ENTMCNC: 28.2 PG — SIGNIFICANT CHANGE UP (ref 27–34)
MCHC RBC-ENTMCNC: 32.6 G/DL — SIGNIFICANT CHANGE UP (ref 32–36)
MCV RBC AUTO: 86.6 FL — SIGNIFICANT CHANGE UP (ref 80–100)
MONOCYTES # BLD AUTO: 0.82 K/UL — SIGNIFICANT CHANGE UP (ref 0–0.9)
MONOCYTES NFR BLD AUTO: 6.1 % — SIGNIFICANT CHANGE UP (ref 2–14)
MPO AB + PR3 PNL SER: SIGNIFICANT CHANGE UP
MYCOPLASMA AG SPEC QL: NEGATIVE — SIGNIFICANT CHANGE UP
NEUTROPHILS # BLD AUTO: 10.34 K/UL — HIGH (ref 1.8–7.4)
NEUTROPHILS NFR BLD AUTO: 76.9 % — SIGNIFICANT CHANGE UP (ref 43–77)
NORMALIZED SCT PPP-RTO: 1.06 RATIO — SIGNIFICANT CHANGE UP (ref 0–1.16)
NORMALIZED SCT PPP-RTO: SIGNIFICANT CHANGE UP
NRBC # BLD: 2 /100 WBCS — HIGH (ref 0–0)
P-ANCA SER-ACNC: NEGATIVE — SIGNIFICANT CHANGE UP
P-ANCA SER-ACNC: NEGATIVE — SIGNIFICANT CHANGE UP
PAT CTL 2H: 40.6 SEC — HIGH (ref 24.5–36.6)
PHOSPHATE SERPL-MCNC: 4.2 MG/DL — SIGNIFICANT CHANGE UP (ref 2.5–4.5)
PLATELET # BLD AUTO: 176 K/UL — SIGNIFICANT CHANGE UP (ref 150–400)
POTASSIUM SERPL-MCNC: 3.7 MMOL/L — SIGNIFICANT CHANGE UP (ref 3.5–5.3)
POTASSIUM SERPL-SCNC: 3.7 MMOL/L — SIGNIFICANT CHANGE UP (ref 3.5–5.3)
PROT SERPL-MCNC: 7 GM/DL — SIGNIFICANT CHANGE UP (ref 6–8.3)
QUANT TB PLUS MITOGEN MINUS NIL: -0.01 IU/ML — SIGNIFICANT CHANGE UP
QUANT TB PLUS MITOGEN MINUS NIL: -0.01 IU/ML — SIGNIFICANT CHANGE UP
RBC # BLD: 3.72 M/UL — LOW (ref 3.8–5.2)
RBC # FLD: 14.2 % — SIGNIFICANT CHANGE UP (ref 10.3–14.5)
RF+CCP IGG SER-IMP: ABNORMAL
SODIUM SERPL-SCNC: 140 MMOL/L — SIGNIFICANT CHANGE UP (ref 135–145)
WBC # BLD: 13.45 K/UL — HIGH (ref 3.8–10.5)
WBC # FLD AUTO: 13.45 K/UL — HIGH (ref 3.8–10.5)

## 2023-10-10 PROCEDURE — 99233 SBSQ HOSP IP/OBS HIGH 50: CPT

## 2023-10-10 RX ORDER — DEXTROSE 50 % IN WATER 50 %
25 SYRINGE (ML) INTRAVENOUS ONCE
Refills: 0 | Status: COMPLETED | OUTPATIENT
Start: 2023-10-10 | End: 2023-10-10

## 2023-10-10 RX ADMIN — Medication 650 MILLIGRAM(S): at 11:24

## 2023-10-10 RX ADMIN — Medication 650 MILLIGRAM(S): at 12:00

## 2023-10-10 RX ADMIN — HEPARIN SODIUM 5000 UNIT(S): 5000 INJECTION INTRAVENOUS; SUBCUTANEOUS at 06:06

## 2023-10-10 RX ADMIN — HEPARIN SODIUM 5000 UNIT(S): 5000 INJECTION INTRAVENOUS; SUBCUTANEOUS at 13:59

## 2023-10-10 RX ADMIN — PIPERACILLIN AND TAZOBACTAM 25 GRAM(S): 4; .5 INJECTION, POWDER, LYOPHILIZED, FOR SOLUTION INTRAVENOUS at 06:04

## 2023-10-10 RX ADMIN — PIPERACILLIN AND TAZOBACTAM 25 GRAM(S): 4; .5 INJECTION, POWDER, LYOPHILIZED, FOR SOLUTION INTRAVENOUS at 13:59

## 2023-10-10 RX ADMIN — Medication 25 MILLILITER(S): at 06:38

## 2023-10-10 RX ADMIN — CHLORHEXIDINE GLUCONATE 1 APPLICATION(S): 213 SOLUTION TOPICAL at 06:10

## 2023-10-10 NOTE — SWALLOW BEDSIDE ASSESSMENT ADULT - SLP GENERAL OBSERVATIONS
pt seen sitting upright in the chair, in MICU on NC02 and alert. pt responded to questions for assessment, verbalized wants and was able to follow one step directions. speech intelligibility judged to be fair to good and noted high pitch voice. pt denied feeling weak, she stated "shaky". sp02 trending 95-97 during po trials pt seen sitting upright in the chair, in MICU on NC02 and alert. pt responded to questions for assessment, verbalized wants and was able to follow one step directions. speech intelligibility judged to be fair to good- reduced articulatory precision and noted high pitch voice. pt denied feeling weak, she stated "shaky". sp02 trending 95-97 during po trials

## 2023-10-10 NOTE — SWALLOW BEDSIDE ASSESSMENT ADULT - H & P REVIEW
" 53 years old female with no significant PMH present to ED with complain of worsening SOB for 1 day. Patietn reported cough for 3-4 days, fever for 1 day and worsening SOB for 1 day. No nausea, vomiting or diarrhea. No known sick exposure"/yes

## 2023-10-10 NOTE — SWALLOW BEDSIDE ASSESSMENT ADULT - SLP PERTINENT HISTORY OF CURRENT PROBLEM
c/o cough and worsening SOB  pt admitted to telemtery for hypoxic respiratory failure, multifocal pneumonia. hospital course RRT 10/6 for Abnormal respiratory rate, Airway concerns, Hypoxia- pt intubated and transferred to MICU. pt intubated 10/6 and extubated 10/9 c/o cough and worsening SOB  pt admitted to telemetry for hypoxic respiratory failure, multifocal pneumonia. hospital course RRT 10/6 for Abnormal respiratory rate, Airway concerns, Hypoxia- pt intubated and transferred to MICU. pt intubated 10/6 and extubated 10/9

## 2023-10-10 NOTE — SWALLOW BEDSIDE ASSESSMENT ADULT - SWALLOW EVAL: DIAGNOSIS
oropharyngeal swallow marked by adequate labial seal/oral containment, functional mastication, bolus manipulation/formation and transport posterior-noted small bolus size for solid/thin liquid, +pharyngeal swallow trigger and hyolaryngeal elevation to palpation. no overt clinical signs of airway penetration

## 2023-10-10 NOTE — PROGRESS NOTE ADULT - ASSESSMENT
Pt is a 54 yo BF with h/o poss RA presented with AHRF 2 to PNA. On day of admission pt deteriorated and had RRT; pt intubated and pt transferred to ICU. CT chest showed severe GGO/consolidations and bronchiectasis. Pt met criteria for ARDS; s/p proning. Currently oxygenation improved. s/p extubation 10/9    Resp: Supplemental O2 prn to maintain O2 sat >92%/ Eventual Pulm consult for work up of pt's lung dz   ID: Cx NTD; finish course of Zosyn for a total of 7 days  CVS: May need to start antiHTN med  Heme: DVT prophylaxis with sq Heparin  FEN: Po diet as per Speech/Swallow evaluation  Endo: Follow FS and cover with Lispro  PT/OT/ OOB->chair  Fransisco Grover and TLC  F/u Rheum labs  Social:  at bedside and updated/ Pt is full code

## 2023-10-10 NOTE — PROGRESS NOTE ADULT - SUBJECTIVE AND OBJECTIVE BOX
HPI:  Pt is a 52 yo BF with h/o poss RA presented with AHRF 2 to PNA. On day of admission pt deteriorated and had RRT; pt intubated and pt transferred to ICU. CT chest showed severe GGO/consolidations and bronchiectasis. Pt met criteria for ARDS; s/p proning. Currently oxygenation improved. s/p extubation 10/9      ## Labs:  CBC:                        10.5   13.45 )-----------( 176      ( 10 Oct 2023 02:58 )             32.2     Chem:  10-10    140  |  101  |  22  ----------------------------<  67<L>  3.7   |  33<H>  |  0.84    Ca    9.0      10 Oct 2023 02:58  Phos  4.2     10-10  Mg     2.2     10-10    TPro  7.0  /  Alb  2.7<L>  /  TBili  0.9  /  DBili  x   /  AST  47<H>  /  ALT  53  /  AlkPhos  91  10-10    Coags:  PTT - ( 10 Oct 2023 02:58 )  PTT:54.1 sec        ## Imaging:    ## Medications:  piperacillin/tazobactam IVPB.. 3.375 Gram(s) IV Intermittent every 8 hours      albuterol/ipratropium for Nebulization 3 milliLiter(s) Nebulizer every 6 hours PRN      heparin   Injectable 5000 Unit(s) SubCutaneous every 8 hours      acetaminophen     Tablet .. 650 milliGRAM(s) Oral every 6 hours PRN  ondansetron Injectable 4 milliGRAM(s) IV Push every 8 hours PRN      ## Vitals:  T(C): 37.5 (10-10-23 @ 12:00), Max: 38 (10-10-23 @ 11:00)  HR: 109 (10-10-23 @ 13:00) (79 - 137)  BP: 143/91 (10-10-23 @ 13:00) (130/83 - 176/114)  BP(mean): 107 (10-10-23 @ 13:00) (98 - 130)  RR: 18 (10-10-23 @ 13:00) (13 - 33)  SpO2: 99% (10-10-23 @ 13:00) (92% - 100%)  Wt(kg): --  Vent:   ABG: ABG - ( 09 Oct 2023 09:50 )  pH, Arterial: 7.41  pH, Blood: x     /  pCO2: 46    /  pO2: 79    / HCO3: 29    / Base Excess: 3.8   /  SaO2: 96.4                  10-09 @ 07:01  -  10-10 @ 07:00  --------------------------------------------------------  IN: 417.6 mL / OUT: 2950 mL / NET: -2532.4 mL    10-10 @ 07:01  -  10-10 @ 13:56  --------------------------------------------------------  IN: 0 mL / OUT: 530 mL / NET: -530 mL          ## P/E:  Gen: lying comfortably in bed in no apparent distress  Neck: L IJ TLC  Lungs: CTA  Heart: Tachy  Abd: Soft/+BS/ Non-tender  Ext: No edema  Neuro: AAO x3    CENTRAL LINE: [x ] YES [ ] NO  LOCATION: L IJ TLC  DATE INSERTED:  REMOVE: [ ] YES [ ] NO      CARRIZALES: [ x] YES [ ] NO    DATE INSERTED:  REMOVE:  [ ] YES [ ] NO      A-LINE:  [ ] YES [ ] NO  LOCATION:   DATE INSERTED:  REMOVE:  [ ] YES [ ] NO  EXPLAIN:    CODE STATUS: [x ] full code  [ ] DNR  [ ] DNI  [ ] [facility]  Goals of care discussion: [ ] yes

## 2023-10-10 NOTE — SWALLOW BEDSIDE ASSESSMENT ADULT - SWALLOW EVAL: PATIENT/FAMILY GOALS STATEMENT
pt stated "I may be a little scared" referring to eating and swallowing. she denied h/o dysphagia. pt reported voice/speech "it's getting better"

## 2023-10-11 LAB
ALBUMIN SERPL ELPH-MCNC: 2.8 G/DL — LOW (ref 3.3–5)
ALP SERPL-CCNC: 76 U/L — SIGNIFICANT CHANGE UP (ref 40–120)
ALT FLD-CCNC: 49 U/L — SIGNIFICANT CHANGE UP (ref 12–78)
ANION GAP SERPL CALC-SCNC: 3 MMOL/L — LOW (ref 5–17)
AST SERPL-CCNC: 44 U/L — HIGH (ref 15–37)
B2 GLYCOPROT1 AB SER QL: NEGATIVE — SIGNIFICANT CHANGE UP
BILIRUB SERPL-MCNC: 1.2 MG/DL — SIGNIFICANT CHANGE UP (ref 0.2–1.2)
BUN SERPL-MCNC: 14 MG/DL — SIGNIFICANT CHANGE UP (ref 7–23)
CALCIUM SERPL-MCNC: 9.2 MG/DL — SIGNIFICANT CHANGE UP (ref 8.5–10.1)
CHLORIDE SERPL-SCNC: 103 MMOL/L — SIGNIFICANT CHANGE UP (ref 96–108)
CO2 SERPL-SCNC: 32 MMOL/L — HIGH (ref 22–31)
CREAT SERPL-MCNC: 0.61 MG/DL — SIGNIFICANT CHANGE UP (ref 0.5–1.3)
CULTURE RESULTS: SIGNIFICANT CHANGE UP
CULTURE RESULTS: SIGNIFICANT CHANGE UP
EGFR: 106 ML/MIN/1.73M2 — SIGNIFICANT CHANGE UP
GLUCOSE SERPL-MCNC: 81 MG/DL — SIGNIFICANT CHANGE UP (ref 70–99)
HCT VFR BLD CALC: 35.3 % — SIGNIFICANT CHANGE UP (ref 34.5–45)
HGB BLD-MCNC: 11.4 G/DL — LOW (ref 11.5–15.5)
MAGNESIUM SERPL-MCNC: 2.2 MG/DL — SIGNIFICANT CHANGE UP (ref 1.6–2.6)
MCHC RBC-ENTMCNC: 28 PG — SIGNIFICANT CHANGE UP (ref 27–34)
MCHC RBC-ENTMCNC: 32.3 G/DL — SIGNIFICANT CHANGE UP (ref 32–36)
MCV RBC AUTO: 86.7 FL — SIGNIFICANT CHANGE UP (ref 80–100)
NRBC # BLD: 2 /100 WBCS — HIGH (ref 0–0)
PHOSPHATE SERPL-MCNC: 2.6 MG/DL — SIGNIFICANT CHANGE UP (ref 2.5–4.5)
PLATELET # BLD AUTO: 171 K/UL — SIGNIFICANT CHANGE UP (ref 150–400)
POTASSIUM SERPL-MCNC: 4 MMOL/L — SIGNIFICANT CHANGE UP (ref 3.5–5.3)
POTASSIUM SERPL-SCNC: 4 MMOL/L — SIGNIFICANT CHANGE UP (ref 3.5–5.3)
PROT SERPL-MCNC: 7.3 GM/DL — SIGNIFICANT CHANGE UP (ref 6–8.3)
RBC # BLD: 4.07 M/UL — SIGNIFICANT CHANGE UP (ref 3.8–5.2)
RBC # FLD: 14 % — SIGNIFICANT CHANGE UP (ref 10.3–14.5)
SODIUM SERPL-SCNC: 138 MMOL/L — SIGNIFICANT CHANGE UP (ref 135–145)
SPECIMEN SOURCE: SIGNIFICANT CHANGE UP
SPECIMEN SOURCE: SIGNIFICANT CHANGE UP
WBC # BLD: 11.86 K/UL — HIGH (ref 3.8–10.5)
WBC # FLD AUTO: 11.86 K/UL — HIGH (ref 3.8–10.5)

## 2023-10-11 PROCEDURE — 99233 SBSQ HOSP IP/OBS HIGH 50: CPT

## 2023-10-11 PROCEDURE — 93010 ELECTROCARDIOGRAM REPORT: CPT

## 2023-10-11 RX ORDER — ENOXAPARIN SODIUM 100 MG/ML
40 INJECTION SUBCUTANEOUS EVERY 24 HOURS
Refills: 0 | Status: DISCONTINUED | OUTPATIENT
Start: 2023-10-11 | End: 2023-10-18

## 2023-10-11 RX ADMIN — ENOXAPARIN SODIUM 40 MILLIGRAM(S): 100 INJECTION SUBCUTANEOUS at 11:46

## 2023-10-11 RX ADMIN — PIPERACILLIN AND TAZOBACTAM 25 GRAM(S): 4; .5 INJECTION, POWDER, LYOPHILIZED, FOR SOLUTION INTRAVENOUS at 05:21

## 2023-10-11 RX ADMIN — CHLORHEXIDINE GLUCONATE 1 APPLICATION(S): 213 SOLUTION TOPICAL at 03:21

## 2023-10-11 RX ADMIN — PIPERACILLIN AND TAZOBACTAM 25 GRAM(S): 4; .5 INJECTION, POWDER, LYOPHILIZED, FOR SOLUTION INTRAVENOUS at 21:29

## 2023-10-11 RX ADMIN — HEPARIN SODIUM 5000 UNIT(S): 5000 INJECTION INTRAVENOUS; SUBCUTANEOUS at 05:22

## 2023-10-11 RX ADMIN — Medication 1 DROP(S): at 05:23

## 2023-10-11 RX ADMIN — PIPERACILLIN AND TAZOBACTAM 25 GRAM(S): 4; .5 INJECTION, POWDER, LYOPHILIZED, FOR SOLUTION INTRAVENOUS at 13:12

## 2023-10-11 NOTE — PROGRESS NOTE ADULT - SUBJECTIVE AND OBJECTIVE BOX
HPI:  53 years old female with no significant PMH present to ED with complain of worsening SOB for 1 day. Patietn reported cough for 3-4 days, fever for 1 day and worsening SOB for 1 day. No nausea, vomiting or diarrhea. No known sick exposure.  Hypoxic to 69 % at RA, require nonrebreather, tachycardic. WBC 16.81, plt 218, ddimer 282, K 3.4, Cr 0.85, lactate 3, hsTnT 107.4, proBNP 344. CTA chest with no PE. Bilateral lung opacities. Bronchiectasis.     SH: no toxic habits  FH: HTN (06 Oct 2023 11:25)      24 hr events:      ## Labs:  CBC:                        11.4   11.86 )-----------( 171      ( 11 Oct 2023 03:00 )             35.3     Chem:  10-11    138  |  103  |  14  ----------------------------<  81  4.0   |  32<H>  |  0.61    Ca    9.2      11 Oct 2023 03:00  Phos  2.6     10-11  Mg     2.2     10-11    TPro  7.3  /  Alb  2.8<L>  /  TBili  1.2  /  DBili  x   /  AST  44<H>  /  ALT  49  /  AlkPhos  76  10-11    Coags:  PTT - ( 10 Oct 2023 02:58 )  PTT:54.1 sec        ## Imaging:    ## Medications:  piperacillin/tazobactam IVPB.. 3.375 Gram(s) IV Intermittent every 8 hours      albuterol/ipratropium for Nebulization 3 milliLiter(s) Nebulizer every 6 hours PRN      enoxaparin Injectable 40 milliGRAM(s) SubCutaneous every 24 hours      acetaminophen     Tablet .. 650 milliGRAM(s) Oral every 6 hours PRN  ondansetron Injectable 4 milliGRAM(s) IV Push every 8 hours PRN      ## Vitals:  T(C): 36.5 (10-11-23 @ 16:00), Max: 37.6 (10-11-23 @ 05:30)  HR: 102 (10-11-23 @ 20:00) (89 - 122)  BP: 153/89 (10-11-23 @ 20:00) (130/87 - 157/93)  BP(mean): 105 (10-11-23 @ 20:00) (99 - 128)  RR: 25 (10-11-23 @ 20:00) (15 - 29)  SpO2: 100% (10-11-23 @ 20:00) (90% - 100%)  Wt(kg): --  Vent:   ABG:       10-10 @ 07:01  -  10-11 @ 07:00  --------------------------------------------------------  IN: 200 mL / OUT: 1080 mL / NET: -880 mL    10-11 @ 07:01  -  10-11 @ 20:36  --------------------------------------------------------  IN: 100 mL / OUT: 0 mL / NET: 100 mL          ## P/E:  Gen: lying comfortably in bed in no apparent distress  Neck: L IJ TLC  Lungs: CTA  Heart: Tachy  Abd: Soft/+BS/ Non-tender  Ext: No edema  Neuro: AAO x3    CENTRAL LINE: [x ] YES [ ] NO  LOCATION: L IJ TLC  DATE INSERTED:  REMOVE: [ ] YES [ ] NO      CARRIZALES: [ x] YES [ ] NO    DATE INSERTED:  REMOVE:  [ ] YES [ ] NO      A-LINE:  [ ] YES [ ] NO  LOCATION:   DATE INSERTED:  REMOVE:  [ ] YES [ ] NO  EXPLAIN:    CODE STATUS: [x ] full code  [ ] DNR  [ ] DNI  [ ] [facility]  Goals of care discussion: [ ] yes      HPI:  Pt is a 54 yo BF with h/o poss RA presented with AHRF 2 to PNA. On day of admission pt deteriorated and had RRT; pt intubated and pt transferred to ICU. CT chest showed severe GGO/consolidations and bronchiectasis. Pt met criteria for ARDS; s/p proning. Currently oxygenation improved. s/p extubation 10/9          ## Labs:  CBC:                        11.4   11.86 )-----------( 171      ( 11 Oct 2023 03:00 )             35.3     Chem:  10-11    138  |  103  |  14  ----------------------------<  81  4.0   |  32<H>  |  0.61    Ca    9.2      11 Oct 2023 03:00  Phos  2.6     10-11  Mg     2.2     10-11    TPro  7.3  /  Alb  2.8<L>  /  TBili  1.2  /  DBili  x   /  AST  44<H>  /  ALT  49  /  AlkPhos  76  10-11    Coags:  PTT - ( 10 Oct 2023 02:58 )  PTT:54.1 sec        ## Imaging:    ## Medications:  piperacillin/tazobactam IVPB.. 3.375 Gram(s) IV Intermittent every 8 hours      albuterol/ipratropium for Nebulization 3 milliLiter(s) Nebulizer every 6 hours PRN      enoxaparin Injectable 40 milliGRAM(s) SubCutaneous every 24 hours      acetaminophen     Tablet .. 650 milliGRAM(s) Oral every 6 hours PRN  ondansetron Injectable 4 milliGRAM(s) IV Push every 8 hours PRN      ## Vitals:  T(C): 36.5 (10-11-23 @ 16:00), Max: 37.6 (10-11-23 @ 05:30)  HR: 102 (10-11-23 @ 20:00) (89 - 122)  BP: 153/89 (10-11-23 @ 20:00) (130/87 - 157/93)  BP(mean): 105 (10-11-23 @ 20:00) (99 - 128)  RR: 25 (10-11-23 @ 20:00) (15 - 29)  SpO2: 100% (10-11-23 @ 20:00) (90% - 100%)  Wt(kg): --  Vent:   ABG:       10-10 @ 07:01  -  10-11 @ 07:00  --------------------------------------------------------  IN: 200 mL / OUT: 1080 mL / NET: -880 mL    10-11 @ 07:01  -  10-11 @ 20:36  --------------------------------------------------------  IN: 100 mL / OUT: 0 mL / NET: 100 mL          ## P/E:  Gen: sitting comfortably in chair in no apparent distress  Face: (+) nc O2  Lungs: CTA  Heart: Tachy  Abd: Soft/+BS/ Non-tender  Ext: L hand edema  Neuro: AAO x3    CENTRAL LINE: [ ] YES [ ] NO  LOCATION:   DATE INSERTED:  REMOVE: [ ] YES [ ] NO      SOFIE: [  ] YES [ ] NO    DATE INSERTED:  REMOVE:  [ ] YES [ ] NO      A-LINE:  [ ] YES [ ] NO  LOCATION:   DATE INSERTED:  REMOVE:  [ ] YES [ ] NO  EXPLAIN:    CODE STATUS: [x ] full code  [ ] DNR  [ ] DNI  [ ] [facility]  Goals of care discussion: [ ] yes

## 2023-10-11 NOTE — OCCUPATIONAL THERAPY INITIAL EVALUATION ADULT - GENERAL OBSERVATIONS, REHAB EVAL
Therapists collaborated with ISSAC Arriaga prior to seeing pt due to concerns for elevated APPT and verbal consent was given to see pt for consult. Pt was encountered in bed in CCU on continous, cardiac, BP & oximetry monitoring. Pt is on 5L O2, IV heplokp and pnematic teds in place . Foam dressings are applied to both knees for skin protection. Pt was AA&Ox4, cooperative & followed commands. Pt denied pain, but c/o SOB with minimal exertion; this limits pt's activity tolerance ,balance, ADL management & fun sat decreased functional mobility.

## 2023-10-11 NOTE — PHYSICAL THERAPY INITIAL EVALUATION ADULT - STRENGTHENING, PT EVAL
Pt will improve muscle strength in all extremities to WFL in 1 to 2 weeks to perform Gait & ADL independently no AD, without c/o SOB/WILEY

## 2023-10-11 NOTE — OCCUPATIONAL THERAPY INITIAL EVALUATION ADULT - BALANCE TRAINING, PT EVAL
Pt will increased standing balance from fair to good in 30 days to prevent falls, optimize pt's ability for ADL management & safely navigate in all terrains

## 2023-10-11 NOTE — PHYSICAL THERAPY INITIAL EVALUATION ADULT - BED MOBILITY TRAINING, PT EVAL
Pt will be able to move in & out of bed by self independently in 2 to 3 weeks to prevent any pressure sores

## 2023-10-11 NOTE — PHYSICAL THERAPY INITIAL EVALUATION ADULT - PRECAUTIONS/LIMITATIONS, REHAB EVAL
no known precautions/limitations/oxygen therapy device and L/min Since pt baseline was no home O2, NC was taken off temporarily, at rest pt desaturates to 85, 86, hence NC connected to 5L/min. with 5L/min during activity pt desat to 85, with 6L/min with activity pt SO2 maintains at 91%/no known precautions/limitations/fall precautions/oxygen therapy device and L/min

## 2023-10-11 NOTE — PHYSICAL THERAPY INITIAL EVALUATION ADULT - TRANSFER TRAINING, PT EVAL
Pt will be able to perform sit to stand, stand pivot transfer using  no AD, without c/o SOB/WILEY  independently in 2 to 3 weeks

## 2023-10-11 NOTE — PHARMACOTHERAPY INTERVENTION NOTE - COMMENTS
Rapid response called for patient. Pharmacy at bedside for care participation.
Recommended discontinuation of insulin sliding scale and fingersticks at this time given stable glucose readings WNL and no history of diabetes.
Recommended discontinuation of pantoprazole 40 mg IVP daily since the patient no longer meets clinical criteria for stress ulcer prophylaxis. 
Recommended adjustment of stop date to limit duration of piperacillin-tazobactam therapy to 7 days
Recommended switching agent for venous thromboembolism prophylaxis from heparin SQ to enoxaparin SQ 40 mg daily due to ease of administration (reduced frequency) and lower incidence of thrombocytopenia including HIT.

## 2023-10-11 NOTE — PHYSICAL THERAPY INITIAL EVALUATION ADULT - BALANCE TRAINING, PT EVAL
Pt will improve static & dynamic standing balance to Good using  no AD, without c/o SOB/WILEY to perform ADL, Gait independently  in 2 weeks

## 2023-10-11 NOTE — CHART NOTE - NSCHARTNOTEFT_GEN_A_CORE
MICU DOWN GRADE NOTE      Patient is a 53y old  Female who presents with a chief complaint of hypoxic respiratory failure, multifocal pneumonia (11 Oct 2023 16:30)      HPI:  53 years old female with no significant PMH present to ED with complain of worsening SOB for 1 day. Patient reported cough for 3-4 days, fever for 1 day and worsening SOB for 1 day. No nausea, vomiting or diarrhea. No known sick exposure.  Hypoxic to 69 % at RA, require nonrebreather, tachycardic. WBC 16.81, plt 218, ddimer 282, K 3.4, Cr 0.85, lactate 3, hsTnT 107.4, proBNP 344. CTA chest with no PE. Bilateral lung opacities. Bronchiectasis. Initially seen by ICU team and was deemed stable for tele, however later that afternoon she decompensated. RRT called for hypoxia and respiratory distress with tachypnea. She was desatting on NRB to the 70s with respiratory rate in the RR 40s and increased work of breathing with accessory muscle use then subsequently Intubated and placed on mechanical ventilation . CTA chest with no PE. Bilateral lung opacities with bronchiectasis.  Admitted to ICU with respiratory status worsening with high FI02 requirements high airway pressures and desynchrony with the vent requiring sedation and neuromuscular blockade to control respiratory drive for optimizing ventilation and oxygenation. Pt meet criteria for ARDS; s/p proning and extubated on 10/9. Currently oxygenating well on 5L NC.     SH: no toxic habits  FH: HTN (06 Oct 2023 11:25)    INTERVAL HPI/OVERNIGHT EVENTS:  Patient examined at bedside. Patient sitting in bed comfortably, speaking in full sentences. Pt maintaining O2 > 92% on 5L NC. No complaints at this time.      REVIEW OF SYSTEMS:  All negative except those listed in interval HPI    MEDICATIONS:  acetaminophen     Tablet .. 650 milliGRAM(s) Oral every 6 hours PRN  albuterol/ipratropium for Nebulization 3 milliLiter(s) Nebulizer every 6 hours PRN  chlorhexidine 2% Cloths 1 Application(s) Topical <User Schedule>  enoxaparin Injectable 40 milliGRAM(s) SubCutaneous every 24 hours  ondansetron Injectable 4 milliGRAM(s) IV Push every 8 hours PRN  piperacillin/tazobactam IVPB.. 3.375 Gram(s) IV Intermittent every 8 hours      T(C): 36.5 (10-11-23 @ 16:00), Max: 37.6 (10-11-23 @ 05:30)  HR: 114 (10-11-23 @ 18:00) (89 - 142)  BP: 138/92 (10-11-23 @ 18:00) (120/84 - 157/93)  RR: 29 (10-11-23 @ 18:00) (15 - 29)  SpO2: 96% (10-11-23 @ 18:00) (90% - 100%)  Wt(kg): --Vital Signs Last 24 Hrs  T(C): 36.5 (11 Oct 2023 16:00), Max: 37.6 (11 Oct 2023 05:30)  T(F): 97.7 (11 Oct 2023 16:00), Max: 99.7 (11 Oct 2023 05:30)  HR: 114 (11 Oct 2023 18:00) (89 - 142)  BP: 138/92 (11 Oct 2023 18:00) (120/84 - 157/93)  BP(mean): 105 (11 Oct 2023 18:00) (96 - 133)  RR: 29 (11 Oct 2023 18:00) (15 - 29)  SpO2: 96% (11 Oct 2023 18:00) (90% - 100%)    Parameters below as of 11 Oct 2023 16:00  Patient On (Oxygen Delivery Method): nasal cannula  O2 Flow (L/min): 5      PHYSICAL EXAM:  GENERAL: NAD, laying in bed comfortably  HEAD:  Atraumatic  EYES: PERRLA  ENMT:  Moist mucous membranes  CHEST/LUNG: equal breath sounds bilaterally  HEART: Regular rate and rhythm  ABDOMEN: Soft, Nontender, Nondistended; Bowel sounds present  NEURO: Alert & Oriented X3  EXTREMITIES: No LE edema, no calf tenderness  SKIN: No rashes or lesions    Consultant(s) Notes Reviewed:  [x ] YES  [ ] NO  Care Discussed with Consultants/Other Providers [ x] YES  [ ] NO    LABS:                        11.4   11.86 )-----------( 171      ( 11 Oct 2023 03:00 )             35.3     10-11    138  |  103  |  14  ----------------------------<  81  4.0   |  32<H>  |  0.61    Ca    9.2      11 Oct 2023 03:00  Phos  2.6     10-11  Mg     2.2     10-11    TPro  7.3  /  Alb  2.8<L>  /  TBili  1.2  /  DBili  x   /  AST  44<H>  /  ALT  49  /  AlkPhos  76  10-11    PTT - ( 10 Oct 2023 02:58 )  PTT:54.1 sec  Urinalysis Basic - ( 11 Oct 2023 03:00 )    Color: x / Appearance: x / SG: x / pH: x  Gluc: 81 mg/dL / Ketone: x  / Bili: x / Urobili: x   Blood: x / Protein: x / Nitrite: x   Leuk Esterase: x / RBC: x / WBC x   Sq Epi: x / Non Sq Epi: x / Bacteria: x      CAPILLARY BLOOD GLUCOSE            Urinalysis Basic - ( 11 Oct 2023 03:00 )    Color: x / Appearance: x / SG: x / pH: x  Gluc: 81 mg/dL / Ketone: x  / Bili: x / Urobili: x   Blood: x / Protein: x / Nitrite: x   Leuk Esterase: x / RBC: x / WBC x   Sq Epi: x / Non Sq Epi: x / Bacteria: x        RADIOLOGY & ADDITIONAL TESTS:    Imaging Personally Reviewed:  [x ] YES  [ ] NO    To follow up:  Pt is a 54 yo BF with h/o poss RA presented with AHRF 2 to PNA. On day of admission pt deteriorated and had RRT; pt intubated and pt transferred to ICU. CT chest showed severe GGO/consolidations and bronchiectasis. Pt met criteria for ARDS; s/p proning. Currently oxygenation improved. s/p extubation 10/9. Patient currently hemodynamically stable, maintaining a O2 >92% on supplemental O2 and stable for tele floor for continuous O2 pulse ox.     - S/p extubation on 10/9, on 5L NC, maintain O2 sat >90%. Pulmonary following and working up for  undiagnosed ILD  - Continue empirically treating with zosyn   - Needs rheumatology consult; message left for MD Pierce ([phone]), awaiting callback for consult confirmation.  - Pt was remotely treated for TB, ID consulted for question if patient needs further prophylactic treatment, possibly steroids    Case discussed with Dr. Whelan and hospitalist  Pt MICU DOWN GRADE NOTE      Patient is a 53y old  Female who presents with a chief complaint of hypoxic respiratory failure, multifocal pneumonia (11 Oct 2023 16:30)      HPI:  53 years old female with no significant PMH present to ED with complain of worsening SOB for 1 day. Patient reported cough for 3-4 days, fever for 1 day and worsening SOB for 1 day. No nausea, vomiting or diarrhea. No known sick exposure.  Hypoxic to 69 % at RA, require nonrebreather, tachycardic. WBC 16.81, plt 218, ddimer 282, K 3.4, Cr 0.85, lactate 3, hsTnT 107.4, proBNP 344. CTA chest with no PE. Bilateral lung opacities. Bronchiectasis. Initially seen by ICU team and was deemed stable for tele, however later that afternoon she decompensated. RRT called for hypoxia and respiratory distress with tachypnea. She was desatting on NRB to the 70s with respiratory rate in the RR 40s and increased work of breathing with accessory muscle use then subsequently Intubated and placed on mechanical ventilation . CTA chest with no PE. Bilateral lung opacities with bronchiectasis.  Admitted to ICU with respiratory status worsening with high FI02 requirements high airway pressures and desynchrony with the vent requiring sedation and neuromuscular blockade to control respiratory drive for optimizing ventilation and oxygenation. Pt meet criteria for ARDS; s/p proning and extubated on 10/9. Currently oxygenating well on 5L NC.     SH: no toxic habits  FH: HTN (06 Oct 2023 11:25)    INTERVAL HPI/OVERNIGHT EVENTS:  Patient examined at bedside. Patient sitting in bed comfortably, speaking in full sentences. Pt maintaining O2 > 92% on 5L NC. No complaints at this time.      REVIEW OF SYSTEMS:  All negative except those listed in interval HPI    MEDICATIONS:  acetaminophen     Tablet .. 650 milliGRAM(s) Oral every 6 hours PRN  albuterol/ipratropium for Nebulization 3 milliLiter(s) Nebulizer every 6 hours PRN  chlorhexidine 2% Cloths 1 Application(s) Topical <User Schedule>  enoxaparin Injectable 40 milliGRAM(s) SubCutaneous every 24 hours  ondansetron Injectable 4 milliGRAM(s) IV Push every 8 hours PRN  piperacillin/tazobactam IVPB.. 3.375 Gram(s) IV Intermittent every 8 hours      T(C): 36.5 (10-11-23 @ 16:00), Max: 37.6 (10-11-23 @ 05:30)  HR: 114 (10-11-23 @ 18:00) (89 - 142)  BP: 138/92 (10-11-23 @ 18:00) (120/84 - 157/93)  RR: 29 (10-11-23 @ 18:00) (15 - 29)  SpO2: 96% (10-11-23 @ 18:00) (90% - 100%)  Wt(kg): --Vital Signs Last 24 Hrs  T(C): 36.5 (11 Oct 2023 16:00), Max: 37.6 (11 Oct 2023 05:30)  T(F): 97.7 (11 Oct 2023 16:00), Max: 99.7 (11 Oct 2023 05:30)  HR: 114 (11 Oct 2023 18:00) (89 - 142)  BP: 138/92 (11 Oct 2023 18:00) (120/84 - 157/93)  BP(mean): 105 (11 Oct 2023 18:00) (96 - 133)  RR: 29 (11 Oct 2023 18:00) (15 - 29)  SpO2: 96% (11 Oct 2023 18:00) (90% - 100%)    Parameters below as of 11 Oct 2023 16:00  Patient On (Oxygen Delivery Method): nasal cannula  O2 Flow (L/min): 5      PHYSICAL EXAM:  GENERAL: NAD, laying in bed comfortably  HEAD:  Atraumatic  EYES: PERRLA  ENMT:  Moist mucous membranes  CHEST/LUNG: equal breath sounds bilaterally  HEART: Regular rate and rhythm  ABDOMEN: Soft, Nontender, Nondistended; Bowel sounds present  NEURO: Alert & Oriented X3  EXTREMITIES: No LE edema, no calf tenderness  SKIN: No rashes or lesions    Consultant(s) Notes Reviewed:  [x ] YES  [ ] NO  Care Discussed with Consultants/Other Providers [ x] YES  [ ] NO    LABS:                        11.4   11.86 )-----------( 171      ( 11 Oct 2023 03:00 )             35.3     10-11    138  |  103  |  14  ----------------------------<  81  4.0   |  32<H>  |  0.61    Ca    9.2      11 Oct 2023 03:00  Phos  2.6     10-11  Mg     2.2     10-11    TPro  7.3  /  Alb  2.8<L>  /  TBili  1.2  /  DBili  x   /  AST  44<H>  /  ALT  49  /  AlkPhos  76  10-11    PTT - ( 10 Oct 2023 02:58 )  PTT:54.1 sec  Urinalysis Basic - ( 11 Oct 2023 03:00 )    Color: x / Appearance: x / SG: x / pH: x  Gluc: 81 mg/dL / Ketone: x  / Bili: x / Urobili: x   Blood: x / Protein: x / Nitrite: x   Leuk Esterase: x / RBC: x / WBC x   Sq Epi: x / Non Sq Epi: x / Bacteria: x      CAPILLARY BLOOD GLUCOSE            Urinalysis Basic - ( 11 Oct 2023 03:00 )    Color: x / Appearance: x / SG: x / pH: x  Gluc: 81 mg/dL / Ketone: x  / Bili: x / Urobili: x   Blood: x / Protein: x / Nitrite: x   Leuk Esterase: x / RBC: x / WBC x   Sq Epi: x / Non Sq Epi: x / Bacteria: x        RADIOLOGY & ADDITIONAL TESTS:    Imaging Personally Reviewed:  [x ] YES  [ ] NO    To follow up:  Pt is a 54 yo BF with h/o poss RA presented with AHRF 2 to PNA. On day of admission pt deteriorated and had RRT; pt intubated and pt transferred to ICU. CT chest showed severe GGO/consolidations and bronchiectasis. Pt met criteria for ARDS; s/p proning. Currently oxygenation improved. s/p extubation 10/9. Patient currently hemodynamically stable, maintaining a O2 >92% on supplemental O2 and stable for tele floor for continuous O2 pulse ox.     - S/p extubation on 10/9, on 5L NC, maintain O2 sat >90%. Pulmonary following and working up for undiagnosed ILD  - Continue empirically treating with zosyn   - Needs rheumatology consult; message left for MD Pierce ([phone]), awaiting callback for consult confirmation.  - Pt was remotely treated for TB, ID consulted for question if patient needs further prophylactic treatment, possibly steroids    No longer needs ICU level of care and is stable to for transfer to Tele floor. Case discussed with Dr. Perez and MICU DOWN GRADE NOTE      Patient is a 53y old  Female who presents with a chief complaint of hypoxic respiratory failure, multifocal pneumonia (11 Oct 2023 16:30)      HPI:  53 years old female with no significant PMH present to ED with complain of worsening SOB for 1 day. Patient reported cough for 3-4 days, fever for 1 day and worsening SOB for 1 day. No nausea, vomiting or diarrhea. No known sick exposure.  Hypoxic to 69 % at RA, require nonrebreather, tachycardic. WBC 16.81, plt 218, ddimer 282, K 3.4, Cr 0.85, lactate 3, hsTnT 107.4, proBNP 344. CTA chest with no PE. Bilateral lung opacities. Bronchiectasis. Initially seen by ICU team and was deemed stable for tele, however later that afternoon she decompensated. RRT called for hypoxia and respiratory distress with tachypnea. She was desatting on NRB to the 70s with respiratory rate in the RR 40s and increased work of breathing with accessory muscle use then subsequently Intubated and placed on mechanical ventilation . CTA chest with no PE. Bilateral lung opacities with bronchiectasis.  Admitted to ICU with respiratory status worsening with high FI02 requirements high airway pressures and desynchrony with the vent requiring sedation and neuromuscular blockade to control respiratory drive for optimizing ventilation and oxygenation. Pt meet criteria for ARDS; s/p proning and extubated on 10/9. Currently oxygenating well on 5L NC.     SH: no toxic habits  FH: HTN (06 Oct 2023 11:25)    INTERVAL HPI/OVERNIGHT EVENTS:  Patient examined at bedside. Patient sitting in bed comfortably, speaking in full sentences. Pt maintaining O2 > 92% on 5L NC. No complaints at this time.      REVIEW OF SYSTEMS:  All negative except those listed in interval HPI    MEDICATIONS:  acetaminophen     Tablet .. 650 milliGRAM(s) Oral every 6 hours PRN  albuterol/ipratropium for Nebulization 3 milliLiter(s) Nebulizer every 6 hours PRN  chlorhexidine 2% Cloths 1 Application(s) Topical <User Schedule>  enoxaparin Injectable 40 milliGRAM(s) SubCutaneous every 24 hours  ondansetron Injectable 4 milliGRAM(s) IV Push every 8 hours PRN  piperacillin/tazobactam IVPB.. 3.375 Gram(s) IV Intermittent every 8 hours      T(C): 36.5 (10-11-23 @ 16:00), Max: 37.6 (10-11-23 @ 05:30)  HR: 114 (10-11-23 @ 18:00) (89 - 142)  BP: 138/92 (10-11-23 @ 18:00) (120/84 - 157/93)  RR: 29 (10-11-23 @ 18:00) (15 - 29)  SpO2: 96% (10-11-23 @ 18:00) (90% - 100%)  Wt(kg): --Vital Signs Last 24 Hrs  T(C): 36.5 (11 Oct 2023 16:00), Max: 37.6 (11 Oct 2023 05:30)  T(F): 97.7 (11 Oct 2023 16:00), Max: 99.7 (11 Oct 2023 05:30)  HR: 114 (11 Oct 2023 18:00) (89 - 142)  BP: 138/92 (11 Oct 2023 18:00) (120/84 - 157/93)  BP(mean): 105 (11 Oct 2023 18:00) (96 - 133)  RR: 29 (11 Oct 2023 18:00) (15 - 29)  SpO2: 96% (11 Oct 2023 18:00) (90% - 100%)    Parameters below as of 11 Oct 2023 16:00  Patient On (Oxygen Delivery Method): nasal cannula  O2 Flow (L/min): 5      PHYSICAL EXAM:  GENERAL: NAD, laying in bed comfortably  HEAD:  Atraumatic  EYES: PERRLA  ENMT:  Moist mucous membranes  CHEST/LUNG: equal breath sounds bilaterally  HEART: Regular rate and rhythm  ABDOMEN: Soft, Nontender, Nondistended; Bowel sounds present  NEURO: Alert & Oriented X3  EXTREMITIES: No LE edema, no calf tenderness  SKIN: No rashes or lesions    Consultant(s) Notes Reviewed:  [x ] YES  [ ] NO  Care Discussed with Consultants/Other Providers [ x] YES  [ ] NO    LABS:                        11.4   11.86 )-----------( 171      ( 11 Oct 2023 03:00 )             35.3     10-11    138  |  103  |  14  ----------------------------<  81  4.0   |  32<H>  |  0.61    Ca    9.2      11 Oct 2023 03:00  Phos  2.6     10-11  Mg     2.2     10-11    TPro  7.3  /  Alb  2.8<L>  /  TBili  1.2  /  DBili  x   /  AST  44<H>  /  ALT  49  /  AlkPhos  76  10-11    PTT - ( 10 Oct 2023 02:58 )  PTT:54.1 sec  Urinalysis Basic - ( 11 Oct 2023 03:00 )    Color: x / Appearance: x / SG: x / pH: x  Gluc: 81 mg/dL / Ketone: x  / Bili: x / Urobili: x   Blood: x / Protein: x / Nitrite: x   Leuk Esterase: x / RBC: x / WBC x   Sq Epi: x / Non Sq Epi: x / Bacteria: x      CAPILLARY BLOOD GLUCOSE            Urinalysis Basic - ( 11 Oct 2023 03:00 )    Color: x / Appearance: x / SG: x / pH: x  Gluc: 81 mg/dL / Ketone: x  / Bili: x / Urobili: x   Blood: x / Protein: x / Nitrite: x   Leuk Esterase: x / RBC: x / WBC x   Sq Epi: x / Non Sq Epi: x / Bacteria: x        RADIOLOGY & ADDITIONAL TESTS:    Imaging Personally Reviewed:  [x ] YES  [ ] NO    To follow up:  Pt is a 52 yo BF with h/o poss RA presented with AHRF 2 to PNA. On day of admission pt deteriorated and had RRT; pt intubated and pt transferred to ICU. CT chest showed severe GGO/consolidations and bronchiectasis. Pt met criteria for ARDS; s/p proning. Currently oxygenation improved. s/p extubation 10/9. Patient currently hemodynamically stable, maintaining a O2 >92% on supplemental O2 and stable for tele floor for continuous O2 pulse ox.     - S/p extubation on 10/9, on 5L NC, maintain O2 sat >90%. Pulmonary following and working up for undiagnosed ILD  - Continue empirically treating with zosyn   - Needs rheumatology consult; message left for MD Pierce ([phone]), awaiting callback for consult confirmation.  - Pt was remotely treated for TB, ID consulted for question if patient needs further prophylactic treatment, possibly steroids    No longer needs ICU level of care and is stable to for transfer to Tele floor. Case discussed with Dr. Perez and verbal sign out with MD Lane

## 2023-10-11 NOTE — CHART NOTE - NSCHARTNOTEFT_GEN_A_CORE
Food allergies as stated on 10/09, reviewed c pt.  Pt does have some intolerances to "preservatives" in canned foods, but however does eat them sometimes.  Pt does eat skin of certain kind of apples, freshly picked ones, pt stated that she will remove skin prior to eating.  Pt can tolerate grains, more like an intolerance if she eats grains very late.   Other Allergies to strawberries, tomatoes, chocolate was noted on 10/06.  Will discontinue allergy to 'preservatives", skin of the apple and "grainy" consistency of foods as previously reported by pt & daughter.  10/10, swallow evaluation c recommendation for soft and bite sized thin fluids.  Diet, Regular:   Soft and Bite Sized (SOFTBTSZ) (10-10-23 @ 13:19) [Active] Food allergies as stated on 10/09, reviewed c pt.  Pt does have some intolerances to "preservatives" in canned foods, but however does eat them sometimes.  Pt does eat skin of certain kind of apples, freshly picked ones, pt stated that she will remove skin prior to eating.  Pt can tolerate grains, more like an intolerance if she eats grains very late, without allergies to chips, can't tolerate spicy chips.  Other Allergies to strawberries, tomatoes, chocolate was noted on 10/06.  Will discontinue allergy to 'preservatives", skin of the apple and "grainy" consistency of foods & chips as previously reported by pt & daughter.  [facility] made of update on food allergies/intolerances as reported @ present.    10/10, swallow evaluation c recommendation for soft and bite sized thin fluids.  Diet, Regular:   Soft and Bite Sized (SOFTBTSZ) (10-10-23 @ 13:19) [Active] Food allergies as stated on 10/09, reviewed c pt.  Pt does have some intolerances to "preservatives" in canned foods, but however does eat them sometimes.  Pt does eat skin of certain kind of apples, freshly picked ones, pt stated that she will remove skin prior to eating.  Pt can tolerate grains, more like an intolerance if she eats grains very late, without allergies to chips, can't tolerate spicy chips.  Other Allergies to strawberries, tomatoes, chocolate was noted on 10/06.  Will discontinue allergy to 'preservatives", skin of the apple and "grainy" consistency of foods & chips as previously reported by pt & daughter.  [facility] made of update on food allergies/intolerances as reported @ present, pt/family to select food choices as per her allergies/intolerances.  Per RN, pt had complained about itchy throat after eating something this morning.  Recommend monitor for allergies/intolerances.  10/10, swallow evaluation c recommendation for soft and bite sized thin fluids.  Diet, Regular:   Soft and Bite Sized (SOFTBTSZ) (10-10-23 @ 13:19) [Active]

## 2023-10-11 NOTE — OCCUPATIONAL THERAPY INITIAL EVALUATION ADULT - PERTINENT HX OF CURRENT PROBLEM, REHAB EVAL
Pt is a 54 y/o female who presented to ER with "complain of worsening SOB for 1 day. Patient reported cough for 3-4 days, fever for 1 day and worsening SOB for 1 day. No nausea, vomiting or diarrhea. No known sick exposure. Hypoxic to 69 % at RA, require nonrebreather, tachycardic. WBC 16.81, plt 218, ddimer 282, K 3.4, Cr 0.85, lactate 3, hsTnT 107.4, proBNP 344. CTA chest with no PE. Bilateral lung opacities, and  Bronchiectasis". Pt is diagnosed with _ Shortness of breath , Fever and hypoxia. RRT called, intubated on 10/06 and pt was subsequently intubated and extubated on 10/9 on 5 L of O2. CT angio  on 10/6/23 results confirm Negative for pulmonary embolism. Bilateral lung opacities, which may represent multifocal pneumonia or pulmonary edema.

## 2023-10-11 NOTE — PHYSICAL THERAPY INITIAL EVALUATION ADULT - ADDITIONAL COMMENTS
As per pt, she lives in a house with 4 steps to enter with wide b/l rails, no other step, she was independent in all functional mobility using no AD PTA, She has no home O2, PTA

## 2023-10-11 NOTE — PHYSICAL THERAPY INITIAL EVALUATION ADULT - IMPAIRMENTS CONTRIBUTING TO GAIT DEVIATIONS, PT EVAL
poor activity tolerance/impaired balance/decreased flexibility/impaired postural control/decreased strength

## 2023-10-11 NOTE — PROGRESS NOTE ADULT - ASSESSMENT
Pt is a 52 yo BF with h/o poss RA presented with AHRF 2 to PNA. On day of admission pt deteriorated and had RRT; pt intubated and pt transferred to ICU. CT chest showed severe GGO/consolidations and bronchiectasis. Pt met criteria for ARDS; s/p proning. Currently oxygenation improved. s/p extubation 10/9    Resp: Supplemental O2 prn to maintain O2 sat >92%/ Work up of pt's chronic  lung dz as per Pulm consultID: Cx NTD; finish course of Zosyn for a total of 7 days  CVS: May need to start antiHTN med  Heme: DVT prophylaxis with sq Heparin  FEN: Po diet as per Speech/Swallow evaluation  Endo: Follow FS and cover with Lispro  PT/OT/ OOB->chair  Rheum consult  Social:  at bedside and updated/ Pt is full code   Pt is a 54 yo BF with h/o poss RA presented with AHRF 2 to PNA. On day of admission pt deteriorated and had RRT; pt intubated and pt transferred to ICU. CT chest showed severe GGO/consolidations and bronchiectasis. Pt met criteria for ARDS; s/p proning. Currently oxygenation improved. s/p extubation 10/9    Resp: Supplemental O2 prn to maintain O2 sat >92%/ Work up of pt's chronic  lung dz as per Pulm consult  ID: Cx NTD; finish course of Zosyn for a total of 7 days/ ID consult pt with remote h/o TB Rx and pt may need to be started on chronic Steroids  CVS: May need to start antiHTN med/ Check EKG pt tachycardic  Heme: DVT prophylaxis change sq Heparin to Lovenox  FEN: Po diet as per Speech/Swallow evaluation  Endo: Follow FS and cover with Lispro  PT/OT/ OOB->chair  Rheum consult  Social:  at bedside and updated/ Pt is full code/ May transfer to [facility]

## 2023-10-11 NOTE — OCCUPATIONAL THERAPY INITIAL EVALUATION ADULT - NSOTDISCHREC_GEN_A_CORE
Acute Rehab to further improve balance, strength ADL mananement. Pt's was independent . Patient now off functional baseline. Patient will tolerate 2-3 rehab disciplines to address functional needs. Patient will benefit from MD supervision and rehab nursing through course of rehab to monitor/address complex medical needs.

## 2023-10-11 NOTE — OCCUPATIONAL THERAPY INITIAL EVALUATION ADULT - PRECAUTIONS/LIMITATIONS, REHAB EVAL
Monitor pt's Oxygen saturation with activities since pt is exhausted  and pt desats with exertion./cardiac precautions/fall precautions

## 2023-10-11 NOTE — PHYSICAL THERAPY INITIAL EVALUATION ADULT - PERTINENT HX OF CURRENT PROBLEM, REHAB EVAL
As per ED provider notes "52 yo F with sob, fever, malaise, generally weak, starting this afternoon while pt. at work.  Pt. took Tylenol with relief, but about 2 am pt. started to have increased work of breathing, feeling like she can't catch her breath."   RRT called, intubated on 10/06  NP Corina cleared for PT eval despite APTT-54.1, hence pT eval done As per ED provider notes "52 yo F with sob, fever, malaise, generally weak, starting this afternoon while pt. at work.  Pt. took Tylenol with relief, but about 2 am pt. started to have increased work of breathing, feeling like she can't catch her breath."   RRT called, intubated on 10/06  NP Corina cleared for PT eval despite APTT-54.1, hence PT eval done

## 2023-10-11 NOTE — OCCUPATIONAL THERAPY INITIAL EVALUATION ADULT - ADDITIONAL COMMENTS
Prior to admission, pt was functioning in her roles, self sufficient & ambulating independently without any assistive devices. pt does not use home oxygen. Presently pt needs assistance with functional and self care tasks due to generalized weakness poor endurance from current pneumonia. Pt requires increased time , multiple rest intervals with deep breathing / energy conservation techniques to complete tasks. Pt is right hand dominant and wears glasses for reading.

## 2023-10-11 NOTE — OCCUPATIONAL THERAPY INITIAL EVALUATION ADULT - PLANNED THERAPY INTERVENTIONS, OT EVAL
energy conservation techniques/ADL retraining/IADL retraining/balance training/bed mobility training/fine motor coordination training/parent/caregiver training.../strengthening/transfer training

## 2023-10-11 NOTE — CONSULT NOTE ADULT - SUBJECTIVE AND OBJECTIVE BOX
CHIEF COMPLAINT:    HPI:  53 year old woman with history of RA with hand joint erosion on xray not on DMARD(was on short course in the past),  History of TB partially? treated (?2 months in 2009) records [place] [facility] but this is before their EMR.  CXR from 2019 reviewed and had fibrotic changes BL worse on left that seem consistent with the fibrotic areas on current CT.    She is at baseline fully functional with no limitations due to dyspnea.  She has interment cough that lasts for days or weeks at a time and self resolves   This time cough started and was followed by sever SOB.  This was while she was at work as a .  She had fevers, rigors and Sever SOB. In the ER she was 69% on ar an in distress.   She was intubated.  Was treated with abx, diuretics and steroids.   And had improved to extubation.    She is a non smoker, has had some pulmonary changes even in her home country decades ago ([facility]), works as a .        PAST MEDICAL & SURGICAL HISTORY:  No pertinent past medical history      No significant past surgical history          FAMILY HISTORY:      SOCIAL HISTORY: See above.   Smoking: [x Never Smoked [ ] Former Smoker (__ packs x ___ years) [ ] Current Smoker  (__ packs x ___ years)  Substance Use: [ ] Never Used [ ] Used ____  EtOH Use: no  Marital Status: [ ] Single [x]  [ ]  [ ]   Sexual History:   Occupation:  Recent Travel:  Country of Birth:  Advance Directives:    Allergies    No Known Drug Allergies  Tomatoes (Urticaria)  strawberries, tomatoes, chocolate; reaction of cough, itchy throat (Other)    Intolerances        HOME MEDICATIONS:  none      REVIEW OF SYSTEMS:  Constitutional: [ ] negative [ x] fevers [ ] chills [ ] weight loss [ ] weight gain  HEENT: [ x] negative [ ] dry eyes [ ] eye irritation [ ] postnasal drip [ ] nasal congestion  CV: [x ] negative  [ ] chest pain [ ] orthopnea [ ] palpitations [ ] murmur  Resp: [ ] negative [x ] cough [x ] shortness of breath [x] dyspnea [ ] wheezing [ ] sputum [ ] hemoptysis  GI: [x ] negative [ ] nausea [ ] vomiting [ ] diarrhea [ ] constipation [ ] abd pain [ ] dysphagia   : [ x] negative [ ] dysuria [ ] nocturia [ ] hematuria [ ] increased urinary frequency  Musculoskeletal: [x ] negative [ ] back pain [ ] myalgias [ ] arthralgias [ ] fracture  Skin: [ x] negative [ ] rash [ ] itch  Neurological: [x ] negative [ ] headache [ ] dizziness [ ] syncope [ ] weakness [ ] numbness  Psychiatric: [x ] negative [ ] anxiety [ ] depression  Endocrine: [ x] negative [ ] diabetes [ ] thyroid problem  Hematologic/Lymphatic: [x ] negative [ ] anemia [ ] bleeding problem  Allergic/Immunologic: [ ]x negative [ ] itchy eyes [ ] nasal discharge [ ] hives [ ] angioedema  [ ] All other systems negative  [ ] Unable to assess ROS because ________    OBJECTIVE:  ICU Vital Signs Last 24 Hrs  T(C): 36.5 (11 Oct 2023 16:00), Max: 37.6 (11 Oct 2023 05:30)  T(F): 97.7 (11 Oct 2023 16:00), Max: 99.7 (11 Oct 2023 05:30)  HR: 107 (11 Oct 2023 16:00) (89 - 142)  BP: 145/88 (11 Oct 2023 16:00) (120/84 - 157/93)  BP(mean): 104 (11 Oct 2023 16:00) (95 - 133)  ABP: --  ABP(mean): --  RR: 20 (11 Oct 2023 16:00) (15 - 26)  SpO2: 100% (11 Oct 2023 16:00) (90% - 100%)    O2 Parameters below as of 11 Oct 2023 16:00  Patient On (Oxygen Delivery Method): nasal cannula  O2 Flow (L/min): 5            10-10 @ 07:01  -  10-11 @ 07:00  --------------------------------------------------------  IN: 200 mL / OUT: 1080 mL / NET: -880 mL    10-11 @ 07:01  -  10-11 @ 16:51  --------------------------------------------------------  IN: 100 mL / OUT: 0 mL / NET: 100 mL      CAPILLARY BLOOD GLUCOSE      POCT Blood Glucose.: 80 mg/dL (10 Oct 2023 07:34)      PHYSICAL EXAM:  General: NAD, comfortable on 4l  HEENT: No nasal septal degradation.  MMM  Lymph Nodes: not palpable  Neck: no JVD  Respiratory: Rales B/L tachypnia  Cardiovascular: S1S2 tachycardia sinus rythm.   Abdomen: Soft NTND, BS+  Extremities: No edema  Skin: No rash, no shawl rash, no skin changes around the hands  Neurological: No issues normal exam   Psychiatry:    HOSPITAL MEDICATIONS:  Standing Meds:  chlorhexidine 2% Cloths 1 Application(s) Topical <User Schedule>  enoxaparin Injectable 40 milliGRAM(s) SubCutaneous every 24 hours  piperacillin/tazobactam IVPB.. 3.375 Gram(s) IV Intermittent every 8 hours      PRN Meds:  acetaminophen     Tablet .. 650 milliGRAM(s) Oral every 6 hours PRN  albuterol/ipratropium for Nebulization 3 milliLiter(s) Nebulizer every 6 hours PRN  ondansetron Injectable 4 milliGRAM(s) IV Push every 8 hours PRN      LABS:                        11.4   11.86 )-----------( 171      ( 11 Oct 2023 03:00 )             35.3     Hgb Trend: 11.4<--, 10.5<--, 9.7<--, 10.1<--, 9.9<--  10-11    138  |  103  |  14  ----------------------------<  81  4.0   |  32<H>  |  0.61    Ca    9.2      11 Oct 2023 03:00  Phos  2.6     10-11  Mg     2.2     10-11    TPro  7.3  /  Alb  2.8<L>  /  TBili  1.2  /  DBili  x   /  AST  44<H>  /  ALT  49  /  AlkPhos  76  10-11    Creatinine Trend: 0.61<--, 0.84<--, 0.65<--, 0.74<--, 0.73<--, 0.69<--  PTT - ( 10 Oct 2023 02:58 )  PTT:54.1 sec  Urinalysis Basic - ( 11 Oct 2023 03:00 )    Color: x / Appearance: x / SG: x / pH: x  Gluc: 81 mg/dL / Ketone: x  / Bili: x / Urobili: x   Blood: x / Protein: x / Nitrite: x   Leuk Esterase: x / RBC: x / WBC x   Sq Epi: x / Non Sq Epi: x / Bacteria: x            MICROBIOLOGY:     RADIOLOGY:  [ ] Reviewed and interpreted by me    PULMONARY FUNCTION TESTS:    EKG:

## 2023-10-11 NOTE — OCCUPATIONAL THERAPY INITIAL EVALUATION ADULT - RANGE OF MOTION EXAMINATION
- patient needs to see provider, please confirm appointment that was scheduled with patient      Anusha MARROQUIN, RN       trunk was WNL (within normal limits)/neck was WFL (within functional limits)

## 2023-10-11 NOTE — OCCUPATIONAL THERAPY INITIAL EVALUATION ADULT - KINESTHESIA,  LLE, OT EVAL
PAST MEDICAL HISTORY:  Cardiac pacemaker     COVID-19 vaccine series completed Pfizer    Diabetes     DM (diabetes mellitus)     Dyslipidemia     HLD (hyperlipidemia)     HTN (hypertension)     HTN (hypertension)     
within normal limits

## 2023-10-11 NOTE — OCCUPATIONAL THERAPY INITIAL EVALUATION ADULT - LIVES WITH, PROFILE
in a private house  with 6 entry steps equipped with bilateral hand rails that canbe reached simultaneously.  All living amenities are located on one level. The bathroom has a tub/shower combination, fixed shower head and standard toilet./spouse
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
## 2023-10-11 NOTE — CONSULT NOTE ADULT - ASSESSMENT
52 yo woman with RA, possible stan TB with unclear treatment duration, pulmonary fibrosis with with now acute hypoxic respiratory failure, with CT findings of opacity GGO, Fibrosis, intralobular septal thickening, and high fever initially concern for PNA vs AIP with rapid resolution.  Possibly ILD flare//AIP. 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    I suspect she HAD AIP or ILD flare, She has erosive RA with CCP which is higher risk for ILD, and she has had slowly progressive pulmonary fibrosis. now with this presentation    - Rheumatology consult to discuss DMARD however for now given pulmonary findings will need steroids to start. complicated by unclear TB history    Attempting to obtain records of TB therapy.  This is unlikely active tb and afb is negative x2 even from ETT.  However she will need long term anti inflammatory likely weeks of steroids would need to clarify if we need to treat latent TB before it exacerbates.  Please Call ID for help obtaining [facility] treatment records and discuss initiating latent TB therapy if needed together with Steroid course.   - Would start prednisone 1mg/kg  with plan for very slow taper.     Will continue to follow.

## 2023-10-11 NOTE — PHARMACOTHERAPY INTERVENTION NOTE - INTERVENTION TYPE RECOOMEND
Therapy Recommended - Alternative treatment
Therapy Discontinuation Recommended - No indication
Therapy Discontinuation Recommended - No indication

## 2023-10-12 LAB
ALBUMIN SERPL ELPH-MCNC: 2.6 G/DL — LOW (ref 3.3–5)
ALP SERPL-CCNC: 63 U/L — SIGNIFICANT CHANGE UP (ref 40–120)
ALT FLD-CCNC: 44 U/L — SIGNIFICANT CHANGE UP (ref 12–78)
ANION GAP SERPL CALC-SCNC: 7 MMOL/L — SIGNIFICANT CHANGE UP (ref 5–17)
AST SERPL-CCNC: 36 U/L — SIGNIFICANT CHANGE UP (ref 15–37)
BILIRUB SERPL-MCNC: 1.1 MG/DL — SIGNIFICANT CHANGE UP (ref 0.2–1.2)
BUN SERPL-MCNC: 12 MG/DL — SIGNIFICANT CHANGE UP (ref 7–23)
CALCIUM SERPL-MCNC: 8.9 MG/DL — SIGNIFICANT CHANGE UP (ref 8.5–10.1)
CHLORIDE SERPL-SCNC: 104 MMOL/L — SIGNIFICANT CHANGE UP (ref 96–108)
CO2 SERPL-SCNC: 30 MMOL/L — SIGNIFICANT CHANGE UP (ref 22–31)
CREAT SERPL-MCNC: 0.62 MG/DL — SIGNIFICANT CHANGE UP (ref 0.5–1.3)
EGFR: 106 ML/MIN/1.73M2 — SIGNIFICANT CHANGE UP
GLUCOSE SERPL-MCNC: 99 MG/DL — SIGNIFICANT CHANGE UP (ref 70–99)
HCT VFR BLD CALC: 32.7 % — LOW (ref 34.5–45)
HGB BLD-MCNC: 10.5 G/DL — LOW (ref 11.5–15.5)
MAGNESIUM SERPL-MCNC: 2 MG/DL — SIGNIFICANT CHANGE UP (ref 1.6–2.6)
MCHC RBC-ENTMCNC: 28 PG — SIGNIFICANT CHANGE UP (ref 27–34)
MCHC RBC-ENTMCNC: 32.1 G/DL — SIGNIFICANT CHANGE UP (ref 32–36)
MCV RBC AUTO: 87.2 FL — SIGNIFICANT CHANGE UP (ref 80–100)
NRBC # BLD: 0 /100 WBCS — SIGNIFICANT CHANGE UP (ref 0–0)
PHOSPHATE SERPL-MCNC: 2.6 MG/DL — SIGNIFICANT CHANGE UP (ref 2.5–4.5)
PLATELET # BLD AUTO: 168 K/UL — SIGNIFICANT CHANGE UP (ref 150–400)
POTASSIUM SERPL-MCNC: 3.6 MMOL/L — SIGNIFICANT CHANGE UP (ref 3.5–5.3)
POTASSIUM SERPL-SCNC: 3.6 MMOL/L — SIGNIFICANT CHANGE UP (ref 3.5–5.3)
PROT SERPL-MCNC: 7.2 GM/DL — SIGNIFICANT CHANGE UP (ref 6–8.3)
RBC # BLD: 3.75 M/UL — LOW (ref 3.8–5.2)
RBC # FLD: 14.1 % — SIGNIFICANT CHANGE UP (ref 10.3–14.5)
SODIUM SERPL-SCNC: 141 MMOL/L — SIGNIFICANT CHANGE UP (ref 135–145)
WBC # BLD: 9.06 K/UL — SIGNIFICANT CHANGE UP (ref 3.8–10.5)
WBC # FLD AUTO: 9.06 K/UL — SIGNIFICANT CHANGE UP (ref 3.8–10.5)

## 2023-10-12 PROCEDURE — 99233 SBSQ HOSP IP/OBS HIGH 50: CPT

## 2023-10-12 PROCEDURE — 71045 X-RAY EXAM CHEST 1 VIEW: CPT | Mod: 26

## 2023-10-12 PROCEDURE — 99222 1ST HOSP IP/OBS MODERATE 55: CPT

## 2023-10-12 PROCEDURE — 71250 CT THORAX DX C-: CPT | Mod: 26

## 2023-10-12 PROCEDURE — 99223 1ST HOSP IP/OBS HIGH 75: CPT

## 2023-10-12 PROCEDURE — 99255 IP/OBS CONSLTJ NEW/EST HI 80: CPT

## 2023-10-12 RX ADMIN — Medication 50 MILLIGRAM(S): at 11:33

## 2023-10-12 RX ADMIN — PIPERACILLIN AND TAZOBACTAM 25 GRAM(S): 4; .5 INJECTION, POWDER, LYOPHILIZED, FOR SOLUTION INTRAVENOUS at 05:26

## 2023-10-12 RX ADMIN — ENOXAPARIN SODIUM 40 MILLIGRAM(S): 100 INJECTION SUBCUTANEOUS at 11:33

## 2023-10-12 RX ADMIN — CHLORHEXIDINE GLUCONATE 1 APPLICATION(S): 213 SOLUTION TOPICAL at 05:26

## 2023-10-12 RX ADMIN — PIPERACILLIN AND TAZOBACTAM 25 GRAM(S): 4; .5 INJECTION, POWDER, LYOPHILIZED, FOR SOLUTION INTRAVENOUS at 21:41

## 2023-10-12 NOTE — PROGRESS NOTE ADULT - ASSESSMENT
52 yo woman with RA, possible stan TB with unclear treatment duration, pulmonary fibrosis with with now acute hypoxic respiratory failure, with CT findings of opacity GGO, Fibrosis, intralobular septal thickening, and high fever initially concern for PNA vs AIP with rapid resolution.  Possibly ILD flare//AIP. 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    Dx:  AIP/ or ILD flare 2/2 RA     - pt weaned from 3LNC to RA satting 92%  - pt rapidly improved with steroids, making it less likely there was infectious component as all cx were also negative. although, agree with empiric coverage for CAP For 5 days given how sick she was  -obtained records from outside hospital. prior TB testing negative. Thus, will start on 1mg/kg PO prednisone with slow taper over 6 week course.   - rheum consulted, will continue on steroids for RA and initiate DMARDS with outpt follow up   - will need pulm f/u at pulmonary clinic at [address] [place] [address]     d/w dr ramon  54 yo woman with RA, possible stan TB with unclear treatment duration, pulmonary fibrosis with with now acute hypoxic respiratory failure, with CT findings of opacity GGO, Fibrosis, intralobular septal thickening, and high fever initially concern for PNA vs AIP with rapid resolution.  Possibly ILD flare//AIP. 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    Dx:  AIP/ or ILD flare 2/2 RA     - pt weaned from 3LNC to RA satting 92%  - pt rapidly improved with steroids, making it less likely there was infectious component as all cx were also negative. although, agree with empiric coverage for CAP For 5 days given how sick she was  -obtained records from outside hospital. prior TB testing negative. Thus, will start on 1mg/kg PO prednisone with slow taper over 6 week course.   - rheum consulted, will continue on steroids for RA and initiate DMARDS with outpt follow up   -also recommend initiating on bactrim for pcp prophylaxis with high dose steroids    - will need pulm f/u at pulmonary clinic at [address] [place] [address]     d/w dr ramon  54 yo woman with RA, possible stan TB with unclear treatment duration, pulmonary fibrosis with with now acute hypoxic respiratory failure, with CT findings of opacity GGO, Fibrosis, intralobular septal thickening, and high fever initially concern for PNA vs AIP with rapid resolution.  Possibly ILD flare//AIP. 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    Dx:  AIP/ or ILD flare 2/2 RA     - pt weaned from 3LNC to RA satting 92%  - pt rapidly improved with steroids, making it less likely there was infectious component as all cx were also negative. although, agree with empiric coverage for CAP For 5 days given how sick she was  -obtained records from outside hospital. prior TB testing negative. Thus, will start on 1mg/kg PO prednisone with slow taper over several months will need BActim prophylaxis.   - rheum consulted, will continue on steroids for RA and initiate DMARDS with outpt follow up   -also recommend initiating on bactrim for pcp prophylaxis with high dose steroids    - will need pulm f/u at pulmonary clinic at [address] [place] [address]     d/w dr ramon

## 2023-10-12 NOTE — CONSULT NOTE ADULT - SUBJECTIVE AND OBJECTIVE BOX
HPI:  53 years old female with no significant PMH present to ED with complain of worsening SOB for 1 day. Patietn reported cough for 3-4 days, fever for 1 day and worsening SOB for 1 day. No nausea, vomiting or diarrhea. No known sick exposure.  Hypoxic to 69 % at RA, require nonrebreather, tachycardic. WBC 16.81, plt 218, ddimer 282, K 3.4, Cr 0.85, lactate 3, hsTnT 107.4, proBNP 344. CTA chest with no PE. Bilateral lung opacities. Bronchiectasis.     SH: no toxic habits  FH: HTN (06 Oct 2023 11:25)      PAST MEDICAL & SURGICAL HISTORY:  No pertinent past medical history      No significant past surgical history          Allergies    No Known Drug Allergies  Tomatoes (Urticaria)  strawberries, tomatoes, chocolate; reaction of cough, itchy throat (Other)    Intolerances        ANTIMICROBIALS:  piperacillin/tazobactam IVPB.. 3.375 every 8 hours      OTHER MEDS:  acetaminophen     Tablet .. 650 milliGRAM(s) Oral every 6 hours PRN  albuterol/ipratropium for Nebulization 3 milliLiter(s) Nebulizer every 6 hours PRN  chlorhexidine 2% Cloths 1 Application(s) Topical <User Schedule>  enoxaparin Injectable 40 milliGRAM(s) SubCutaneous every 24 hours  ondansetron Injectable 4 milliGRAM(s) IV Push every 8 hours PRN  predniSONE   Tablet 50 milliGRAM(s) Oral daily      SOCIAL HISTORY:    Marital Status:    Occupation:   Lives with:     Substance Use (street drugs):   Tobacco Usage:    Alcohol Usage: Social EtOH    FAMILY HISTORY:      ROS:  Unobtainable because:   All other systems negative     Constitutional: no fever, no chills, no weight loss, no night sweats  Eye: no eye pain, no redness, no vision changes  ENT:  no sore throat, no rhinorrhea  Cardiovascular:  no chest pain, no palpitation  Respiratory:  no SOB, no cough  GI:  no abd pain, no vomiting, no diarrhea  urinary: no dysuria, no hematuria, no flank pain  : no  discharge or bleeding  musculoskeletal:  no joint pain, no joint swelling  skin:  no rash  neurology:  no headache, no seizure, no change in mental status  psych: no anxiety, no depression     Physical Exam:    General:    NAD, non toxic  Head: atraumatic, normocephalic  Eyes: normal sclera and conjunctiva  ENT:   no oropharyngeal lesions, no LAD, neck supple  Cardio:    regular S1,S2, no murmur  Respiratory:   clear to auscultation b/l, no wheezing  abd:   soft, BS +, not tender, no hepatosplenomegaly  :     no CVAT, no suprapubic tenderness, no warren  Musculoskeletal : no joint swelling, no edema  Skin:    no rash  vascular:  normal pulses  Neurologic:     no focal deficits  psych: normal affect, no suicidal ideation      Drug Dosing Weight  Height (cm): 157.5 (09 Oct 2023 15:30)  Weight (kg): 51.1 (11 Oct 2023 22:34)  BMI (kg/m2): 20.6 (11 Oct 2023 22:34)  BSA (m2): 1.5 (11 Oct 2023 22:34)    Vital Signs Last 24 Hrs  T(F): 99.6 (10-12-23 @ 05:17), Max: 101.3 (10-06-23 @ 19:30)    Vital Signs Last 24 Hrs  HR: 106 (10-12-23 @ 05:17) (97 - 122)  BP: 126/85 (10-12-23 @ 05:17) (126/85 - 154/96)  RR: 18 (10-12-23 @ 05:17)  SpO2: 97% (10-12-23 @ 05:17) (94% - 100%)  Wt(kg): --                          10.5   9.06  )-----------( 168      ( 12 Oct 2023 06:58 )             32.7       10-12    141  |  104  |  12  ----------------------------<  99  3.6   |  30  |  0.62    Ca    8.9      12 Oct 2023 06:58  Phos  2.6     10-12  Mg     2.0     10-12    TPro  7.2  /  Alb  2.6<L>  /  TBili  1.1  /  DBili  x   /  AST  36  /  ALT  44  /  AlkPhos  63  10-12      Urinalysis Basic - ( 12 Oct 2023 06:58 )    Color: x / Appearance: x / SG: x / pH: x  Gluc: 99 mg/dL / Ketone: x  / Bili: x / Urobili: x   Blood: x / Protein: x / Nitrite: x   Leuk Esterase: x / RBC: x / WBC x   Sq Epi: x / Non Sq Epi: x / Bacteria: x        MICROBIOLOGY:  v  .Sputum  10-07-23   Culture is being performed.  --  --      ET Tube ET Tube  10-07-23   Culture is being performed.  --  --      .Sputum Sputum  10-06-23   No growth  --    Few polymorphonuclear leukocytes per low power field  No Squamous epithelial cells per low power field  Rare Gram Negative Rods per oil power field      Clean Catch Clean Catch (Midstream)  10-06-23   <10,000 CFU/mL Normal Urogenital Lolis  --  --      .Blood Blood-Peripheral  10-06-23   No growth at 5 days  --  --          Rapid RVP Result: Macarenatec (10-06 @ 05:55)          RADIOLOGY:          asked to see this patient at the request of pulmonologist  for continuing steroids for patient with presumed ILD secondary to uncontrolled RA with remote history of ? pulmonary TB.    HPI-  Patient is a 53 year old female originally from [facility] who came to [place] about [place] years ago .  she states when she was in her country in the 1990's she had seen a doctor  and was told her cxr looked like old/ previous Tb and that she did not need any medications for this.  she denies any known exposure to TB and denies any weight loss, denies ever having any hemoptysis and denies any night sweats.  She states in 2009 she was at [facility] and again the question of pulmonary TB  came up and she states at that time also she was told most likely old/healed TB but she was given medicine for 2 months ( she does not recall how many pills or the names).  she also mentions she does have RA but has not been on meds for this.  she works in hair salon and she states  last week she developed sob and fever and her  who is a nurse checked her o2 sat and was very low and hence she was brought to hospital and here she was found to be hypoxemic and was intubated and her chest ct was negative for PE but she had extensive b/l multifocal lung opacities and bronchiectasis, no cavitary lesions were reported.  she improved and was extubated on 10/9.  she has had  2 negative sputum AFB smears here.  she has been on zosyn since admission as per ICU/pulmonary team and also has bene on steroids as well.  spoke with pulmonologist  who is following the patient since admission here and he has her records from 2009 [facility] and apparently she had negative sputum AFB there and on completed 2 months of RIPE ( not clear why ).    patient currently is on med-surg floor and on 3 l NC.  she states her sob is improved, she does have cough and is bringing up whitish sputum, she states her coughs at home were dry.  she denies any fever or chills now, denies any chest pain, denies any abd. pain, denies any nausea or vomiting, denies any diarrhea.  she states her joint pains from her RA is mostly in her right wrist and fingers but she sattes her joint is stiff at first but once she starts using her hand and fingers it improves.  she denies any recent travel and denies any sick contacts.          SH: no toxic habits  FH: HTN (06 Oct 2023 11:25)      PAST MEDICAL & SURGICAL HISTORY: RA    No pertinent past medical history      No significant past surgical history          Allergies    No Known Drug Allergies    ANTIMICROBIALS:  piperacillin/tazobactam IVPB.. 3.375 every 8 hours      OTHER MEDS:  acetaminophen     Tablet .. 650 milliGRAM(s) Oral every 6 hours PRN  albuterol/ipratropium for Nebulization 3 milliLiter(s) Nebulizer every 6 hours PRN  chlorhexidine 2% Cloths 1 Application(s) Topical <User Schedule>  enoxaparin Injectable 40 milliGRAM(s) SubCutaneous every 24 hours  ondansetron Injectable 4 milliGRAM(s) IV Push every 8 hours PRN  predniSONE   Tablet 50 milliGRAM(s) Oral daily      Substance Use (street drugs):   Tobacco Usage:  denies  Alcohol Usage: denies    FAMILY HISTORY: denies any family h/o TB      ROS:    Constitutional: no fever, no chills, no weight loss, no night sweats, no hemoptysis  Eye: no eye pain, no redness, no vision changes  ENT:  no sore throat, no rhinorrhea  Cardiovascular:  no chest pain, no palpitation  Respiratory:  no SOB now but had it prior to admission.  + cough with whitish sputum  GI:  no abd pain, no vomiting, no diarrhea  urinary: no dysuria, no hematuria, no flank pain  : no  discharge or bleeding  musculoskeletal:  no joint pain, no joint swelling  skin:  no rash  neurology:  no headache, no seizure, no change in mental status  psych: no anxiety, no depression     Physical Exam:    General:    NAD, non toxic  Head: atraumatic, normocephalic  Eyes: normal sclera and conjunctiva  ENT:   no oropharyngeal lesions, no LAD, neck supple  Cardio:   S1S2 tachycardic, no murmur  Respiratory:   coarse breath sounds bibasilar,no wheezing  abd:   soft, BS +, not tender, no distention  :     no CVAT, no suprapubic tenderness  Musculoskeletal : no joint swelling, no edema , FROM of her fingers and wrist  Skin:    no rash  vascular:  normal pulses  Neurologic:     no focal deficits, AAO x 3  psych: normal affect, no suicidal ideation      Drug Dosing Weight  Height (cm): 157.5 (09 Oct 2023 15:30)  Weight (kg): 51.1 (11 Oct 2023 22:34)  BMI (kg/m2): 20.6 (11 Oct 2023 22:34)  BSA (m2): 1.5 (11 Oct 2023 22:34)    Vital Signs Last 24 Hrs  T(F): 99.6 (10-12-23 @ 05:17), Max: 101.3 (10-06-23 @ 19:30)    Vital Signs Last 24 Hrs  HR: 106 (10-12-23 @ 05:17) (97 - 122)  BP: 126/85 (10-12-23 @ 05:17) (126/85 - 154/96)  RR: 18 (10-12-23 @ 05:17)  SpO2: 97% (10-12-23 @ 05:17) (94% - 100%)  Wt(kg): --                          10.5   9.06  )-----------( 168      ( 12 Oct 2023 06:58 )             32.7       10-12    141  |  104  |  12  ----------------------------<  99  3.6   |  30  |  0.62    Ca    8.9      12 Oct 2023 06:58  Phos  2.6     10-12  Mg     2.0     10-12    TPro  7.2  /  Alb  2.6<L>  /  TBili  1.1  /  DBili  x   /  AST  36  /  ALT  44  /  AlkPhos  63  10-12      Urinalysis Basic - ( 12 Oct 2023 06:58 )    Color: x / Appearance: x / SG: x / pH: x  Gluc: 99 mg/dL / Ketone: x  / Bili: x / Urobili: x   Blood: x / Protein: x / Nitrite: x   Leuk Esterase: x / RBC: x / WBC x   Sq Epi: x / Non Sq Epi: x / Bacteria: x        MICROBIOLOGY:  v  .Sputum  10-07-23   Culture is being performed.  --  --      ET Tube ET Tube  10-07-23   Culture is being performed.  --  --      .Sputum Sputum  10-06-23   No growth  --    Few polymorphonuclear leukocytes per low power field  No Squamous epithelial cells per low power field  Rare Gram Negative Rods per oil power field      Clean Catch Clean Catch (Midstream)  10-06-23   <10,000 CFU/mL Normal Urogenital Lolis  --  --      .Blood Blood-Peripheral  10-06-23   No growth at 5 days  --  --  Culture - Acid Fast - Sputum w/Smear (10.07.23 @ 08:23)    Acid Fast Bacilli Smear:   No acid-fast bacilli seen by fluorochrome stain      Culture - Acid Fast - Sputum w/Smear . (10.07.23 @ 00:20)    Acid Fast Bacilli Smear:   No acid-fast bacilli seen by fluorochrome stain        Rapid RVP Result: NotDetec (10-06 @ 05:55)      RADIOLOGY:  < from: CT Angio Chest PE Protocol w/ IV Cont (10.06.23 @ 07:02) >  FINDINGS:    LUNGS AND AIRWAYS: Patent central airways.  Bilateral groundglass and   consolidative opacities. Bronchiectasis.  PLEURA: No pleural effusion or pneumothorax.  MEDIASTINUM AND EMILIA: No lymphadenopathy.  VESSELS: No thoracic aortic aneurysm or dissection. No pulmonary arterial   filling defects identified.  HEART: Heart size is normal. No pericardial effusion.  CHEST WALL AND LOWER NECK: Within normal limits.  VISUALIZED UPPER ABDOMEN: Reflux of intravenous contrast into the IVC and   hepatic veins.  BONES: Within normal limits.    IMPRESSION:  Negative for pulmonary embolism.  Bilateral lung opacities, which may represent multifocal pneumonia or   pulmonary edema.      --- End of Report ---            STEPAN VU; Attending Radiologist  This document has been electronically signed. Oct  6 2023  8:03AM    < end of copied text >    < from: Xray Chest 1 View- PORTABLE-Urgent (Xray Chest 1 View- PORTABLE-Urgent .) (10.07.23 @ 17:54) >  FINDINGS:  Heart/Vascular: The heart size, mediastinum, hilum and aorta are within   normal limits for projection.  Pulmonary: Midline trachea. There are again seen large confluent airspace   opacities and consolidations greater in the upper lobes. There is no   pneumothorax.  Bones: There is no fracture.  Lines and catheter: ET tube upper trachea. Tip of left IJ catheter in   SVC. NG tube below diaphragm. Tip beyond field-of-view.    Impression:    No significant change in large confluent airspace opacities and   consolidations greater in theupper lobes.    --- End of Report ---            ALTAGRACIA BRANTLEY DO; Attending Radiologist  This document has been electronically signed. Oct  9 2023  1:03PM    < end of copied text >

## 2023-10-12 NOTE — CONSULT NOTE ADULT - ASSESSMENT
A/P-  53 year old female originally from [facility] with ? past pulmonary TB with 2 months of RIPE treatment in 2009 in [place] , admitted here with sob and fever and hypoxemic and multifocal pneumonia, bronchiectasis.    hospital course-  was intubated and then improved and was extubated 10/9 is now on tele floor on 3L NC  has had 2 negative sputum AFB here  quantiferon Gold- Indeterminate result  afebrile  leukocytosis has resolved  sputum cx- negative  Blood cx- neg x 2  HIV ab/ag- negative    Bronchiectais  ? remote h/o pulmonary TB with 2 months RIPE tx in 2009 ( apparently as per puomonologist her sputum AFB was negative in 2009 as well).  multifocal pneumonia  ?? ILD in light of her untreated RA and her improvement with steroids that she was already given during this hospitalization by ICU/pulmonary team.    CT was reviewed by radiologist and per radiologist more c/w bronchiectaisis and pneumonia, no cavitary lesions as per radiologist.    Plan-  advise to check repeat Chest Ct since she has clinically  improved to see if her extensive bronchiectasis and pulmonary opacities have improved.  would advise to also check one more sputum AFB .  advise to continue with current IV abx zosyn .  advise to hold off on full dose steroids for ? ILD pending above tests and if chest CT improved and repeat sputum AFB is negative then can start the steroids as per pulmonologist recs and monitor closely with both pulm and rhumatologist .    All labs and imaging and chart notes reviewed.     All above discussed with patient and patient verbalizes full understanding of all above and agrees with above plan of care.    Thank you for this interesting consultation.    Orestes James MD  Infectious Disease Attending    for any questions please do not hesitate to contact me either via teams or by calling [phone]

## 2023-10-12 NOTE — CHART NOTE - NSCHARTNOTEFT_GEN_A_CORE
Obtained Tb records. And She was on 2 full month of RIPE therapy.  Though further investigation in 2009 Revealed that she is actually class 4 tb.  No further treatment was deemed needed.  Per records of [facility] (one of the [facility] Tb clinics)

## 2023-10-12 NOTE — CONSULT NOTE ADULT - REASON FOR ADMISSION
hypoxic respiratory failure, multifocal pneumonia

## 2023-10-12 NOTE — PROGRESS NOTE ADULT - SUBJECTIVE AND OBJECTIVE BOX
INTERVAL HPI/OVERNIGHT EVENTS: no acute events overnight. Pt weaned to 3LNC satting 100%    SUBJECTIVE: Patient seen and examined at bedside. Pt denies chest pain, SOB, abd pain, n/v and endorses improvement in cough. She is now has improved airway clearance.     ROS: All negative except as listed above.    VITAL SIGNS:  ICU Vital Signs Last 24 Hrs  T(C): 37.3 (12 Oct 2023 11:09), Max: 37.9 (11 Oct 2023 23:49)  T(F): 99.1 (12 Oct 2023 11:09), Max: 100.3 (11 Oct 2023 23:49)  HR: 119 (12 Oct 2023 11:09) (97 - 122)  BP: 126/81 (12 Oct 2023 11:09) (126/81 - 154/96)  BP(mean): 105 (11 Oct 2023 22:00) (99 - 128)  ABP: --  ABP(mean): --  RR: 18 (12 Oct 2023 11:33) (16 - 29)  SpO2: 100% (12 Oct 2023 11:33) (94% - 100%)    O2 Parameters below as of 12 Oct 2023 11:33  Patient On (Oxygen Delivery Method): room air        10-11 @ 07:01  -  10-12 @ 07:00  --------------------------------------------------------  IN: 400 mL / OUT: 80 mL / NET: 320 mL          ECG: reviewed.    PHYSICAL EXAM:    GENERAL: NAD, laying in bed comfortably  HEAD:  Atraumatic  EYES: PERRLA  ENMT:  Moist mucous membranes  CHEST/LUNG: scant wheezing upper lobes bilat   HEART: Regular rate and rhythm  ABDOMEN: Soft, Nontender, Nondistended; Bowel sounds present  NEURO: Alert & Oriented X3  EXTREMITIES: No LE edema, no calf tenderness      MEDICATIONS:  MEDICATIONS  (STANDING):  chlorhexidine 2% Cloths 1 Application(s) Topical <User Schedule>  enoxaparin Injectable 40 milliGRAM(s) SubCutaneous every 24 hours  piperacillin/tazobactam IVPB.. 3.375 Gram(s) IV Intermittent every 8 hours  predniSONE   Tablet 50 milliGRAM(s) Oral daily    MEDICATIONS  (PRN):  acetaminophen     Tablet .. 650 milliGRAM(s) Oral every 6 hours PRN Temp greater or equal to 38C (100.4F), Mild Pain (1 - 3), Moderate Pain (4 - 6)  albuterol/ipratropium for Nebulization 3 milliLiter(s) Nebulizer every 6 hours PRN Shortness of Breath and/or Wheezing  ondansetron Injectable 4 milliGRAM(s) IV Push every 8 hours PRN Nausea and/or Vomiting      ALLERGIES:  Allergies    No Known Drug Allergies  Tomatoes (Urticaria)  strawberries, tomatoes, chocolate; reaction of cough, itchy throat (Other)    Intolerances        LABS:                        10.5   9.06  )-----------( 168      ( 12 Oct 2023 06:58 )             32.7     10-12    141  |  104  |  12  ----------------------------<  99  3.6   |  30  |  0.62    Ca    8.9      12 Oct 2023 06:58  Phos  2.6     10-12  Mg     2.0     10-12    TPro  7.2  /  Alb  2.6<L>  /  TBili  1.1  /  DBili  x   /  AST  36  /  ALT  44  /  AlkPhos  63  10-12      Urinalysis Basic - ( 12 Oct 2023 06:58 )    Color: x / Appearance: x / SG: x / pH: x  Gluc: 99 mg/dL / Ketone: x  / Bili: x / Urobili: x   Blood: x / Protein: x / Nitrite: x   Leuk Esterase: x / RBC: x / WBC x   Sq Epi: x / Non Sq Epi: x / Bacteria: x        Micro:    Culture - Blood (collected 10-06-23 @ 05:30)  Source: .Blood Blood-Peripheral  Final Report (10-11-23 @ 09:01):    No growth at 5 days    Culture - Blood (collected 10-06-23 @ 05:30)  Source: .Blood Blood-Peripheral  Final Report (10-11-23 @ 09:01):    No growth at 5 days        Culture - Sputum (collected 10-06-23 @ 21:00)  Source: .Sputum Sputum  Gram Stain (10-07-23 @ 06:47):    Few polymorphonuclear leukocytes per low power field    No Squamous epithelial cells per low power field    Rare Gram Negative Rods per oil power field  Final Report (10-08-23 @ 16:34):    No growth        RADIOLOGY & ADDITIONAL TESTS: Reviewed.

## 2023-10-12 NOTE — CONSULT NOTE ADULT - ASSESSMENT
53 year old female w/ hx of RA presented with dyspnea.  CT chest revealed GGO, Fibrosis, and intralobular septal thickening.  Symptoms have improved rapidly on steroids, suggestive of ILD flare, possibly secondary to RA.  RA currently clinically quiescent.   Pt also w/ (+)LORA, though no other obvious signs/symptoms of another underlying connective tissue disorder.    - Cont prednisone, as per pulmonology.    - Recommend that pt follow up with me as an outpatient to discuss DMARD initiation.    - Will order further serologies.

## 2023-10-12 NOTE — PROGRESS NOTE ADULT - ASSESSMENT
Pt is a 54 yo BF with h/o poss RA presented with AHRF 2 to PNA. On day of admission pt deteriorated and had RRT; pt intubated and pt transferred to ICU. CT chest showed severe GGO/consolidations and bronchiectasis. Pt met criteria for ARDS; s/p proning. Currently oxygenation improved. s/p extubation 10/9    Resp:   acute resp failure- with hypoxia presumed secondary to pna-ards with possibly a autoimmune rheum component   s/p extubation   currently on 2 lpm  continue antibx   and steroids appreciate pulm and rheum consult       ID: Cx NTD; finish course of Zosyn for a total of 7 days/ ID consult pt with remote h/o TB Rx and pt may need to be started on chronic Steroids    CVS: May need to start antiHTN med/ Check EKG pt tachycardic      Heme: DVT prophylaxis on lovenox

## 2023-10-12 NOTE — CONSULT NOTE ADULT - SUBJECTIVE AND OBJECTIVE BOX
Pt seen and examined.  Full consult to follow. HISTORY OF PRESENT ILLNESS:    Patient is a 53y Female    HPI:  53 years old female with no significant PMH present to ED with complain of worsening SOB for 1 day. Patient reported cough for 3-4 days, fever for 1 day and worsening SOB for 1 day. No nausea, vomiting or diarrhea. No known sick exposure.  Hypoxic to 69 % at RA, require nonrebreather, tachycardic. WBC 16.81, plt 218, ddimer 282, K 3.4, Cr 0.85, lactate 3, hsTnT 107.4, proBNP 344. CTA chest with no PE. Bilateral lung opacities. Bronchiectasis.   Of note, pt w/ hx of rheumatoid arthritis, diagnosed about 9 years ago.  Primarily affected her hands.  She was started on treatmetn for it initially (she forgot the name) but didn't tolerate it, so it was D/C'ed.  She has been untreated for the past several years.  She denies any current joint pain/swelling.  No AM stiffness.    SH: no toxic habits  FH: HTN (06 Oct 2023 11:25)      PAST MEDICAL & SURGICAL HISTORY:  No pertinent past medical history      No significant past surgical history          ROS: (+)intermittent dry mouth.  No fever/chills, wt loss, night sweats, chest pain/dyspnea/cough, oral ulcers, rashes, joint pain, alopecia, photosensitivity, dry eye, Raynaud's, dysphagia, focal weakness, or eye symptoms.    MEDICATIONS  (STANDING):  chlorhexidine 2% Cloths 1 Application(s) Topical <User Schedule>  enoxaparin Injectable 40 milliGRAM(s) SubCutaneous every 24 hours  piperacillin/tazobactam IVPB.. 3.375 Gram(s) IV Intermittent every 8 hours  predniSONE   Tablet 50 milliGRAM(s) Oral daily    MEDICATIONS  (PRN):  acetaminophen     Tablet .. 650 milliGRAM(s) Oral every 6 hours PRN Temp greater or equal to 38C (100.4F), Mild Pain (1 - 3), Moderate Pain (4 - 6)  albuterol/ipratropium for Nebulization 3 milliLiter(s) Nebulizer every 6 hours PRN Shortness of Breath and/or Wheezing  ondansetron Injectable 4 milliGRAM(s) IV Push every 8 hours PRN Nausea and/or Vomiting      Allergies    No Known Drug Allergies  Tomatoes (Urticaria)  strawberries, tomatoes, chocolate; reaction of cough, itchy throat (Other)    Intolerances        FAMILY HISTORY:      SOCIAL HISTORY:  Tobacco--   none            Vital Signs Last 24 Hrs  T(C): 37.6 (12 Oct 2023 16:22), Max: 37.9 (11 Oct 2023 23:49)  T(F): 99.7 (12 Oct 2023 16:22), Max: 100.3 (11 Oct 2023 23:49)  HR: 107 (12 Oct 2023 16:22) (100 - 119)  BP: 131/87 (12 Oct 2023 16:22) (126/81 - 153/95)  BP(mean): 105 (11 Oct 2023 22:00) (105 - 111)  RR: 18 (12 Oct 2023 16:22) (16 - 25)  SpO2: 95% (12 Oct 2023 16:22) (95% - 100%)    Parameters below as of 12 Oct 2023 16:22  Patient On (Oxygen Delivery Method): nasal cannula  O2 Flow (L/min): 2      PHYSICAL EXAM:  General :  NAD  HEENT--  no oral ulcers  Nodes--  no LAD  Lungs--  CTA B/L  Heart--  RRR, nlS1 &S2 normal;   Abdomen--  soft, NT, ND +BS  Skin:  no rashes  Musculoskeletal exam:  No synovitis;  normal ROM in all joints    LABS:                        10.5   9.06  )-----------( 168      ( 12 Oct 2023 06:58 )             32.7     10-12    141  |  104  |  12  ----------------------------<  99  3.6   |  30  |  0.62    Ca    8.9      12 Oct 2023 06:58  Phos  2.6     10-12  Mg     2.0     10-12    TPro  7.2  /  Alb  2.6<L>  /  TBili  1.1  /  DBili  x   /  AST  36  /  ALT  44  /  AlkPhos  63  10-12      Urinalysis Basic - ( 12 Oct 2023 06:58 )    Color: x / Appearance: x / SG: x / pH: x  Gluc: 99 mg/dL / Ketone: x  / Bili: x / Urobili: x   Blood: x / Protein: x / Nitrite: x   Leuk Esterase: x / RBC: x / WBC x   Sq Epi: x / Non Sq Epi: x / Bacteria: x    Rheumatoid Factor Quant, Serum or Plasma (10.07.23 @ 04:00)    Rheumatoid Factor Quant, Serum or Plasma: 53 IU/mL    Cyclic Citrullinated Peptide AB (10.07.23 @ 04:00)    Cyclic Cit Pep Result: 44 Units   CCP Antibody IgG Interpretation: Moderate Positive Method: EIA  Cyclic Citrullinated Peptide Ab, IGG Reference Range:                Units                </= 19        Negative                20 - 39       Weak Positive                40 - 80       Moderate Positive                >80            Strong Positive    Anti-Nuclear Antibody (10.07.23 @ 04:00)    Anti Nuclear Factor Titer: 1:640: Antinuclear AB (LORA), IFA Method   LORA Pattern: Speckled    Sedimentation Rate, Erythrocyte (10.07.23 @ 04:00)    Sedimentation Rate, Erythrocyte: 74 mm/hr    C-Reactive Protein, Serum (10.07.23 @ 04:00)    C-Reactive Protein, Serum: 169 mg/L    Neutrophil Cytoplasmic Antibody (10.07.23 @ 00:20)    Cytoplasmic (c-ANCA) Antibody: Negative   Perinuclear (p-ANCA) Antibody: Negative   Atypical ANCA: Indeterminate Method interference due to LORA Fluorescence      RADIOLOGY & ADDITIONAL STUDIES:    < from: CT Angio Chest PE Protocol w/ IV Cont (10.06.23 @ 07:02) >  ACC: [id number] EXAM:  [facility]ST [facility]   ORDERED BY: ANIRUDH MCNEIL     PROCEDURE DATE:  10/06/2023          INTERPRETATION:  CLINICAL INFORMATION: Shortness of breath and hypoxia    COMPARISON: None.    CONTRAST/COMPLICATIONS:  IV Contrast: Omnipaque 350  70 cc administered   30 cc discarded  Oral Contrast: NONE  Complications: None reported at time of study completion    PROCEDURE:  CT Angiography of the Chest.  Sagittal and coronal reformats were performed as well as 3D (MIP)   reconstructions.    FINDINGS:    LUNGS AND AIRWAYS: Patent central airways.  Bilateral groundglass and   consolidative opacities. Bronchiectasis.  PLEURA: No pleural effusion or pneumothorax.  MEDIASTINUM AND EMILIA: No lymphadenopathy.  VESSELS: No thoracic aortic aneurysm or dissection. No pulmonary arterial   filling defects identified.  HEART: Heart size is normal. No pericardial effusion.  CHEST WALL AND LOWER NECK: Within normal limits.  VISUALIZED UPPER ABDOMEN: Reflux of intravenous contrast into the IVC and   hepatic veins.  BONES: Within normal limits.    IMPRESSION:  Negative for pulmonary embolism.  Bilateral lung opacities, which may represent multifocal pneumonia or   pulmonary edema.    < end of copied text >

## 2023-10-12 NOTE — CHART NOTE - NSCHARTNOTEFT_GEN_A_CORE
Telehealth appointment with pulmonary office:  Patient has a follow up scheduled with Dr. Lisker at 10:15 am on 10/17/2023

## 2023-10-12 NOTE — PROGRESS NOTE ADULT - NS ATTEND AMEND GEN_ALL_CORE FT
54 yo woman with RA, possible stan TB with unclear treatment duration, pulmonary fibrosis with with now acute hypoxic respiratory failure, with CT findings of opacity GGO, Fibrosis, intralobular septal thickening, and high fever initially concern for PNA vs AIP with rapid resolution.  Possibly ILD flare//AIP. 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  She had profound hypoxia and inflamation, has history of fibrosis, history of TB treated, and has RA.    The complete resolution of her symptoms this rapidly is odd But given history would taper her steroid slowly and maybe consider more aggressive once on other meds for her RA.    Today weaned off O2, needs to walk on RA Would monitor for another day or 2 still has low grade     - Will follow slow taper for  which would require tapering over months and may benefit from PCP prphylaxis for the first month of therapy.   ----- Prednisone 50 for 2 weeks 25 for 2 weeks followed by prednisone 10mg daily untill seen and reevaluated by pulmonary office.   - I believe based on records from previous TB therapy she does not need further treatment for latent TB.   - Rheumatology evaluation and follow up appreciated.   _ given possibility of inhalational injury she is requested to wear N95 or avoid inhalational exposure at work.     needs follow up within 2 weeks of discharge in with PFTs and repeat imaging. Please call [phone] or email  [email or web address] for an appointment at the Pulmonary office ([address], [address], [place], [place]). 52 yo woman with RA, possible stan TB with unclear treatment duration, pulmonary fibrosis with with now acute hypoxic respiratory failure, with CT findings of opacity GGO, Fibrosis, intralobular septal thickening, and high fever initially concern for PNA vs AIP with rapid resolution.  Possibly ILD flare//AIP. 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  She had profound hypoxia and inflamation, has history of fibrosis, history of TB treated, and has RA.    The complete resolution of her symptoms this rapidly is odd But given history would taper her steroid slowly and maybe consider more aggressive once on other meds for her RA.    Today weaned off O2, needs to walk on RA Would monitor for another day or 2 still has low grade     - Will follow slow taper for  which would require tapering over months and may benefit from PCP prphylaxis for the first month of therapy.   ----- Prednisone 50 for 2 weeks 25 for 2 weeks followed by prednisone 10mg daily untill seen and reevaluated by pulmonary office.   - I believe based on records from previous TB therapy she does not need further treatment for latent TB.   though I would appreciate infectious Disease advice.   - Rheumatology evaluation and follow up appreciated.   _ given possibility of inhalational injury she is requested to wear N95 or avoid inhalational exposure at work.     needs follow up within 2 weeks of discharge in with PFTs and repeat imaging. Please call [phone] or email  [email or web address] for an appointment at the Pulmonary office ([address], [address], [place], [place]).

## 2023-10-12 NOTE — PROGRESS NOTE ADULT - SUBJECTIVE AND OBJECTIVE BOX
Patient is a 53y old  Female who presents with a chief complaint of hypoxic respiratory failure, multifocal pneumonia (12 Oct 2023 12:07)      OVERNIGHT EVENTS:  downgrade from icu    MEDICATIONS  (STANDING):  chlorhexidine 2% Cloths 1 Application(s) Topical <User Schedule>  enoxaparin Injectable 40 milliGRAM(s) SubCutaneous every 24 hours  piperacillin/tazobactam IVPB.. 3.375 Gram(s) IV Intermittent every 8 hours  predniSONE   Tablet 50 milliGRAM(s) Oral daily    MEDICATIONS  (PRN):  acetaminophen     Tablet .. 650 milliGRAM(s) Oral every 6 hours PRN Temp greater or equal to 38C (100.4F), Mild Pain (1 - 3), Moderate Pain (4 - 6)  albuterol/ipratropium for Nebulization 3 milliLiter(s) Nebulizer every 6 hours PRN Shortness of Breath and/or Wheezing  ondansetron Injectable 4 milliGRAM(s) IV Push every 8 hours PRN Nausea and/or Vomiting      Allergies    No Known Drug Allergies  Tomatoes (Urticaria)  strawberries, tomatoes, chocolate; reaction of cough, itchy throat (Other)    Intolerances        SUBJECTIVE: in bed in NAD, no acute events overnight     T(F): 99.1 (10-12-23 @ 11:09), Max: 100.3 (10-11-23 @ 23:49)  HR: 119 (10-12-23 @ 11:09) (97 - 122)  BP: 126/81 (10-12-23 @ 11:09) (126/81 - 154/96)  RR: 18 (10-12-23 @ 11:33) (16 - 29)  SpO2: 100% (10-12-23 @ 11:33) (94% - 100%)  Wt(kg): --    PHYSICAL EXAM:  GENERAL: NAD, well-groomed, well-developed  HEAD:  Atraumatic, Normocephalic  EYES: EOMI, PERRLA, conjunctiva and sclera clear  ENMT: No tonsillar erythema, exudates, or enlargement; Moist mucous membranes, Good dentition, No lesions  NECK: Supple,   CHEST/LUNG: decreased bs at bases; No rales, rhonchi, wheezing, or rubs  bilaterally  HEART: Regular rate and rhythm; No murmurs, rubs, or gallops  ABDOMEN: Soft, Nontender, Nondistended; Bowel sounds present  EXTREMITIES:  2+ Peripheral Pulses, No clubbing, cyanosis, or edema BL LE  SKIN: No rashes or lesions  NERVOUS SYSTEM:  Alert & Oriented X3, Good concentration; Motor Strength 5/5 B/L upper and lower extremities;   DTRs 2+ intact and symmetric, sensation intact BL    LABS:                        10.5   9.06  )-----------( 168      ( 12 Oct 2023 06:58 )             32.7     10-12    141  |  104  |  12  ----------------------------<  99  3.6   |  30  |  0.62    Ca    8.9      12 Oct 2023 06:58  Phos  2.6     10-12  Mg     2.0     10-12    TPro  7.2  /  Alb  2.6<L>  /  TBili  1.1  /  DBili  x   /  AST  36  /  ALT  44  /  AlkPhos  63  10-12      Urinalysis Basic - ( 12 Oct 2023 06:58 )    Color: x / Appearance: x / SG: x / pH: x  Gluc: 99 mg/dL / Ketone: x  / Bili: x / Urobili: x   Blood: x / Protein: x / Nitrite: x   Leuk Esterase: x / RBC: x / WBC x   Sq Epi: x / Non Sq Epi: x / Bacteria: x      Cultures;   CAPILLARY BLOOD GLUCOSE        Lipid panel:           RADIOLOGY & ADDITIONAL TESTS:  < from: TTE Echo Complete w/o Contrast w/ Doppler (10.07.23 @ 10:57) >  Summary:   1. Left ventricular ejection fraction, by visual estimation, is 55 to   60%.   2. Technically adequate study.   3. Normal global left ventricular systolic function.   4. Mildly increased LV wall thickness.   5. Normal left ventricular internal cavity size.   6. Mildly reduced RV systolic function.   7. Moderately enlarged right ventricle.   8. Mildly enlarged right atrium.   9. Normal left atrial size.  10. There is no evidence of pericardial effusion.  11. Structurally normal mitral valve, with normal leaflet excursion.  12. Trace mitral valve regurgitation.  13. Moderate-severe tricuspid regurgitation.  14. Mild aortic valve stenosis.  15. Estimated pulmonary artery systolic pressure is 64.4 mmHg assuming a   right atrial pressure of 8 mmHg, which is consistent with severe   pulmonary hypertension.    < end of copied text >      Imaging Personally Reviewed:  [ x] YES      Consultant(s) Notes Reviewed:  [x ] YES     Care Discussed with [x ] Consultants [X ] Patient [x ] Family  [x ]    [x ]  Other; RN

## 2023-10-13 PROBLEM — Z00.00 ENCOUNTER FOR PREVENTIVE HEALTH EXAMINATION: Status: ACTIVE | Noted: 2023-10-13

## 2023-10-13 LAB
ANTI-RIBONUCLEAR PROTEIN: 0.2 AI — SIGNIFICANT CHANGE UP
C3 SERPL-MCNC: 166 MG/DL — HIGH (ref 81–157)
C4 SERPL-MCNC: 30 MG/DL — SIGNIFICANT CHANGE UP (ref 13–39)
DSDNA AB FLD-ACNC: <0.2 AI — SIGNIFICANT CHANGE UP
ENA SM AB FLD QL: <0.2 AI — SIGNIFICANT CHANGE UP
ENA SS-A AB FLD IA-ACNC: <0.2 AI — SIGNIFICANT CHANGE UP
THYROPEROXIDASE AB SERPL-ACNC: 39.6 IU/ML — HIGH

## 2023-10-13 PROCEDURE — 99232 SBSQ HOSP IP/OBS MODERATE 35: CPT

## 2023-10-13 PROCEDURE — 99233 SBSQ HOSP IP/OBS HIGH 50: CPT

## 2023-10-13 RX ORDER — DILTIAZEM HCL 120 MG
30 CAPSULE, EXT RELEASE 24 HR ORAL EVERY 8 HOURS
Refills: 0 | Status: DISCONTINUED | OUTPATIENT
Start: 2023-10-13 | End: 2023-10-18

## 2023-10-13 RX ORDER — DILTIAZEM HCL 120 MG
30 CAPSULE, EXT RELEASE 24 HR ORAL EVERY 12 HOURS
Refills: 0 | Status: DISCONTINUED | OUTPATIENT
Start: 2023-10-13 | End: 2023-10-13

## 2023-10-13 RX ADMIN — Medication 50 MILLIGRAM(S): at 05:50

## 2023-10-13 RX ADMIN — Medication 30 MILLIGRAM(S): at 21:39

## 2023-10-13 RX ADMIN — PIPERACILLIN AND TAZOBACTAM 25 GRAM(S): 4; .5 INJECTION, POWDER, LYOPHILIZED, FOR SOLUTION INTRAVENOUS at 21:39

## 2023-10-13 RX ADMIN — PIPERACILLIN AND TAZOBACTAM 25 GRAM(S): 4; .5 INJECTION, POWDER, LYOPHILIZED, FOR SOLUTION INTRAVENOUS at 13:53

## 2023-10-13 RX ADMIN — PIPERACILLIN AND TAZOBACTAM 25 GRAM(S): 4; .5 INJECTION, POWDER, LYOPHILIZED, FOR SOLUTION INTRAVENOUS at 05:49

## 2023-10-13 RX ADMIN — ENOXAPARIN SODIUM 40 MILLIGRAM(S): 100 INJECTION SUBCUTANEOUS at 13:52

## 2023-10-13 RX ADMIN — CHLORHEXIDINE GLUCONATE 1 APPLICATION(S): 213 SOLUTION TOPICAL at 05:49

## 2023-10-13 RX ADMIN — Medication 30 MILLIGRAM(S): at 13:54

## 2023-10-13 NOTE — PROGRESS NOTE ADULT - SUBJECTIVE AND OBJECTIVE BOX
INTERVAL HPI/OVERNIGHT EVENTS: No new complaints.    MEDICATIONS  (STANDING):  chlorhexidine 2% Cloths 1 Application(s) Topical <User Schedule>  diltiazem    Tablet 30 milliGRAM(s) Oral every 8 hours  enoxaparin Injectable 40 milliGRAM(s) SubCutaneous every 24 hours  piperacillin/tazobactam IVPB.. 3.375 Gram(s) IV Intermittent every 8 hours  predniSONE   Tablet 50 milliGRAM(s) Oral daily    MEDICATIONS  (PRN):  acetaminophen     Tablet .. 650 milliGRAM(s) Oral every 6 hours PRN Temp greater or equal to 38C (100.4F), Mild Pain (1 - 3), Moderate Pain (4 - 6)  albuterol/ipratropium for Nebulization 3 milliLiter(s) Nebulizer every 6 hours PRN Shortness of Breath and/or Wheezing  ondansetron Injectable 4 milliGRAM(s) IV Push every 8 hours PRN Nausea and/or Vomiting      Allergies    No Known Drug Allergies  Tomatoes (Urticaria)  strawberries, tomatoes, chocolate; reaction of cough, itchy throat (Other)        Vital Signs Last 24 Hrs  T(C): 37.2 (13 Oct 2023 16:30), Max: 37.2 (12 Oct 2023 23:51)  T(F): 99 (13 Oct 2023 16:30), Max: 99 (12 Oct 2023 23:51)  HR: 99 (13 Oct 2023 16:30) (88 - 113)  BP: 131/84 (13 Oct 2023 16:30) (125/84 - 152/87)  BP(mean): --  RR: 18 (13 Oct 2023 16:30) (17 - 19)  SpO2: 99% (13 Oct 2023 16:30) (85% - 100%)    Parameters below as of 13 Oct 2023 09:44  Patient On (Oxygen Delivery Method): room air        PHYSICAL EXAM:  General :  NAD  HEENT--  no oral ulcers  Nodes--  no LAD  Lungs--  CTA B/L  Heart--  RRR, nlS1 &S2 normal;   Abdomen--  soft, NT, ND +BS  Skin:  no rashes  Musculoskeletal exam:  No synovitis;  normal ROM in all joints      LABS:                        10.5   9.06  )-----------( 168      ( 12 Oct 2023 06:58 )             32.7     10-12    141  |  104  |  12  ----------------------------<  99  3.6   |  30  |  0.62    Ca    8.9      12 Oct 2023 06:58  Phos  2.6     10-12  Mg     2.0     10-12    TPro  7.2  /  Alb  2.6<L>  /  TBili  1.1  /  DBili  x   /  AST  36  /  ALT  44  /  AlkPhos  63  10-12      Urinalysis Basic - ( 12 Oct 2023 06:58 )    Color: x / Appearance: x / SG: x / pH: x  Gluc: 99 mg/dL / Ketone: x  / Bili: x / Urobili: x   Blood: x / Protein: x / Nitrite: x   Leuk Esterase: x / RBC: x / WBC x   Sq Epi: x / Non Sq Epi: x / Bacteria: x          RADIOLOGY & ADDITIONAL TESTS:

## 2023-10-13 NOTE — PROGRESS NOTE ADULT - ASSESSMENT
Pt is a 52 yo BF with h/o poss RA presented with AHRF 2 to PNA. On day of admission pt deteriorated and had RRT; pt intubated and pt transferred to ICU. CT chest showed severe GGO/consolidations and bronchiectasis. Pt met criteria for ARDS; s/p proning. Currently oxygenation improved. s/p extubation 10/9    Resp:   acute resp failure- with hypoxia presumed secondary to pna-ards with possibly a autoimmune rheum component   s/p extubation   currently on 2 lpm  continue antibx   and steroids appreciate pulm and rheum consult   10/13/2023 appreciate rheum consult   check room air sat rest and ambulation    ID: Cx NTD; finish course of Zosyn for a total of 7 days/ ID consult pt with remote h/o TB Rx and pt may need to be started on chronic Steroids    [facility]: May need to start antiHTN med/ Check EKG pt tachycardic   10/13/2023 will start Cardizem       Heme: DVT prophylaxis on lovenox

## 2023-10-13 NOTE — PROGRESS NOTE ADULT - ASSESSMENT
54 yo woman with RA, possible stan TB with unclear treatment duration, pulmonary fibrosis with with now acute hypoxic respiratory failure, with CT findings of opacity GGO, Fibrosis, intralobular septal thickening, and high fever initially concern for PNA vs AIP with rapid resolution.  Possibly ILD flare//AIP. 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    Dx:  AIP/ or ILD flare 2/2 RA     - pt weaned from 3LNC to RA satting 92%  - pt rapidly improved with steroids, making it less likely there was infectious component as all cx were also negative. although, agree with empiric coverage for CAP For 5 days given how sick she was  -obtained records from outside hospital. prior TB testing negative. Thus, will start on 1mg/kg PO prednisone with slow taper over several months will need BActim prophylaxis.   - rheum consulted, will continue on steroids for RA and initiate DMARDS with outpt follow up   -also recommend initiating on bactrim for pcp prophylaxis with high dose steroids    - will need pulm f/u at pulmonary clinic at [address] [place] [address]     d/w dr ramon  54 yo woman with RA, possible stan TB with unclear treatment duration, pulmonary fibrosis with with now acute hypoxic respiratory failure, with CT findings of opacity GGO, Fibrosis, intralobular septal thickening, and high fever initially concern for PNA vs AIP with rapid resolution.  Possibly ILD flare//AIP. 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    Dx:  AIP/ or ILD flare 2/2 RA     - patient extubated 10/9  - pt weaned from 3LNC to RA satting 92%- at rest  - with ambulation on room air the patients o2 saturation dropped to 85% . Patient states that she did not feel SOB at the time   - pt rapidly improved with steroids, making it less likely there was infectious component as all cx were also negative. a  - will complete 7 days of Zosyn today- cover CAP For 5 days given how sick she was  - records from [facility] - patient complete 2  months of RIPE for class 4 TB in 2009.  - quantiferon test- indeterminate, AFB negative X 2 thus far, 3rd AFB pending   - prior TB testing negative.   - Will start on 1mg/kg PO prednisone with slow taper over several months  - rheum consulted, will continue on steroids for RA and initiate DMARDS with outpt follow up   -also recommend initiating on bactrim for pcp prophylaxis with high dose steroids    - will need pulm f/u at pulmonary clinic at [address] [place] [address]     d/w Dr Byrd

## 2023-10-13 NOTE — PROGRESS NOTE ADULT - ASSESSMENT
53 year old female w/ hx of RA presented with dyspnea.  CT chest revealed GGO, Fibrosis, and intralobular septal thickening.  Symptoms have improved rapidly on steroids, suggestive of ILD flare, possibly secondary to RA.  RA currently clinically quiescent.   Pt also w/ (+)LORA, though no other obvious signs/symptoms of another underlying connective tissue disorder.    - Cont prednisone, as per pulmonology.    - Recommend that pt follow up with me as an outpatient to discuss DMARD initiation.    - f/u serologies.

## 2023-10-13 NOTE — PROGRESS NOTE ADULT - SUBJECTIVE AND OBJECTIVE BOX
[facility]  INFECTIOUS DISEASES   [address] [place] [address]  Tel: [phone]     Fax: [phone]  ==============================================================================  MD Cristofer Simmons, DO Reina Marshall, NP   ==============================================================================      ADY BENDER  MRN-[id number]  53y ([birth year])      Interval History:    ROS:    [ ] Unobtainable because:  [ ] All other systems negative except as noted    Constitutional: no fever, no chills  Head: no trauma  Eyes: no vision changes, no eye pain  ENT:  no sore throat, no rhinorrhea  Cardiovascular:  no chest pain, no palpitation  Respiratory:  no SOB, no cough  GI:  no abd pain, no vomiting, no diarrhea  urinary: no dysuria, no hematuria, no flank pain  musculoskeletal:  no joint pain, no joint swelling  skin:  no rash  neurology:  no headache, no seizure, no change in mental status  psych: no anxiety, no depression         Allergies  No Known Drug Allergies  Tomatoes (Urticaria)  strawberries, tomatoes, chocolate; reaction of cough, itchy throat (Other)        ANTIMICROBIALS:  piperacillin/tazobactam IVPB.. 3.375 every 8 hours        Physical Exam:  Vital Signs Last 24 Hrs  T(C): 37.2 (13 Oct 2023 05:04), Max: 37.6 (12 Oct 2023 16:22)  T(F): 98.9 (13 Oct 2023 05:04), Max: 99.7 (12 Oct 2023 16:22)  HR: 94 (13 Oct 2023 09:44) (88 - 119)  BP: 152/87 (13 Oct 2023 05:04) (126/81 - 152/87)  BP(mean): --  RR: 19 (13 Oct 2023 09:44) (17 - 19)  SpO2: 85% (13 Oct 2023 09:44) (85% - 100%)    Parameters below as of 13 Oct 2023 09:44  Patient On (Oxygen Delivery Method): room air        10-12-23 @ 07:01  -  10-13-23 @ 07:00  --------------------------------------------------------  IN: 540 mL / OUT: 0 mL / NET: 540 mL      General:    NAD,  non toxic  Head: atraumatic, normocephalic  Eye: normal sclera and conjunctiva  ENT:    no oral lesions, neck supple  Cardio:     regular S1, S2,  no murmur  Respiratory:    clear b/l,    no wheezing  abd:     soft,   BS +,   no tenderness  :   no CVAT,  no suprapubic tenderness,   no  warren  Musculoskeletal:   no joint swelling,   no edema  vascular: no central lines, +PIV   Skin:    no rash  Neurologic:     no focal deficit  psych: normal affect    WBC Count: 9.06 K/uL (10-12 @ 06:58)  WBC Count: 11.86 K/uL (10-11 @ 03:00)  WBC Count: 13.45 K/uL (10-10 @ 02:58)  WBC Count: 16.84 K/uL (10-09 @ 04:55)  WBC Count: 19.34 K/uL (10-08 @ 03:10)  WBC Count: 17.22 K/uL (10-07 @ 04:00)                            10.5   9.06  )-----------( 168      ( 12 Oct 2023 06:58 )             32.7       10-12    141  |  104  |  12  ----------------------------<  99  3.6   |  30  |  0.62    Ca    8.9      12 Oct 2023 06:58  Phos  2.6     10-12  Mg     2.0     10-12    TPro  7.2  /  Alb  2.6<L>  /  TBili  1.1  /  DBili  x   /  AST  36  /  ALT  44  /  AlkPhos  63  10-12      Urinalysis Basic - ( 12 Oct 2023 06:58 )    Color: x / Appearance: x / SG: x / pH: x  Gluc: 99 mg/dL / Ketone: x  / Bili: x / Urobili: x   Blood: x / Protein: x / Nitrite: x   Leuk Esterase: x / RBC: x / WBC x   Sq Epi: x / Non Sq Epi: x / Bacteria: x          Creatinine Trend: 0.62<--, 0.61<--, 0.84<--, 0.65<--, 0.74<--, 0.73<--      MICROBIOLOGY:  v  .Sputum  10-07-23   Culture is being performed.  --  --      ET Tube ET Tube  10-07-23   Culture is being performed.  --  --      .Sputum Sputum  10-06-23   No growth  --    Few polymorphonuclear leukocytes per low power field  No Squamous epithelial cells per low power field  Rare Gram Negative Rods per oil power field      Clean Catch Clean Catch (Midstream)  10-06-23   <10,000 CFU/mL Normal Urogenital Lolis  --  --      .Blood Blood-Peripheral  10-06-23   No growth at 5 days  --  --                  C-Reactive Protein, Serum: 169 (10-07)        D-Dimer Assay, Quantitative: 282 (10-06)            RADIOLOGY:   [facility]  INFECTIOUS DISEASES   [address] [place] [address]  Tel: [phone]     Fax: [phone]  ==============================================================================  MD Cristofer Simmons, DO Reina Marshall, NP   ==============================================================================      ADY BENDER  MRN-[id number]  53y ([birth year])      Interval History:  Patient seen and examined today.  her  is also at her bedside.  patient is in good spirits.  smiling and denies any chills, states her cough is also much less.  states her sob is improving, denies any diarrhea.      as per her  she has had same looking chest ct since she was sin her 20's when she was first evaluated in [facility] with fluffiness on her chest ct b/l and he sattes then in 2009 at [facility] her chest CT had same look and her sputum AFB were all negative but she goot RIPE for 2 months [place].    ROS:        Constitutional: no fever, no chills  Head: no trauma  Eyes: no vision changes, no eye pain  ENT:  no sore throat, no rhinorrhea  Cardiovascular:  no chest pain, no palpitation  Respiratory:  improved in sob, cough is less as well  GI:  no abd pain, no vomiting, no diarrhea  urinary: no dysuria, no hematuria, no flank pain  musculoskeletal:  no joint pain, no joint swelling  skin:  no rash  neurology:  no headache, no seizure, no change in mental status  psych: no anxiety, no depression         Allergies  No Known Drug Allergies  Tomatoes (Urticaria)  strawberries, tomatoes, chocolate; reaction of cough, itchy throat (Other)        ANTIMICROBIALS:  piperacillin/tazobactam IVPB.. 3.375 every 8 hours        Physical Exam:  Vital Signs Last 24 Hrs  T(C): 37.2 (13 Oct 2023 05:04), Max: 37.6 (12 Oct 2023 16:22)  T(F): 98.9 (13 Oct 2023 05:04), Max: 99.7 (12 Oct 2023 16:22)  HR: 94 (13 Oct 2023 09:44) (88 - 119)  BP: 152/87 (13 Oct 2023 05:04) (126/81 - 152/87)  BP(mean): --  RR: 19 (13 Oct 2023 09:44) (17 - 19)  SpO2: 85% (13 Oct 2023 09:44) (85% - 100%)    Parameters below as of 13 Oct 2023 09:44  Patient On (Oxygen Delivery Method): room air        10-12-23 @ 07:01  -  10-13-23 @ 07:00  --------------------------------------------------------  IN: 540 mL / OUT: 0 mL / NET: 540 mL      General:    NAD, non toxic  Head: atraumatic, normocephalic  Eyes: normal sclera and conjunctiva  ENT:   no oropharyngeal lesions, no LAD, neck supple  Cardio:   S1S2 tachycardic, no murmur  Respiratory:   coarse breath sounds bibasilar,no wheezing  abd:   soft, BS +, not tender, no distention  :     no CVAT, no suprapubic tenderness  Musculoskeletal : no joint swelling, no edema , FROM of her fingers and wrist  Skin:    no rash  vascular:  normal pulses  Neurologic:     no focal deficits, AAO x 3  psych: normal affect, no suicidal ideation      WBC Count: 9.06 K/uL (10-12 @ 06:58)  WBC Count: 11.86 K/uL (10-11 @ 03:00)  WBC Count: 13.45 K/uL (10-10 @ 02:58)  WBC Count: 16.84 K/uL (10-09 @ 04:55)  WBC Count: 19.34 K/uL (10-08 @ 03:10)  WBC Count: 17.22 K/uL (10-07 @ 04:00)                            10.5   9.06  )-----------( 168      ( 12 Oct 2023 06:58 )             32.7       10-12    141  |  104  |  12  ----------------------------<  99  3.6   |  30  |  0.62    Ca    8.9      12 Oct 2023 06:58  Phos  2.6     10-12  Mg     2.0     10-12    TPro  7.2  /  Alb  2.6<L>  /  TBili  1.1  /  DBili  x   /  AST  36  /  ALT  44  /  AlkPhos  63  10-12      Urinalysis Basic - ( 12 Oct 2023 06:58 )    Color: x / Appearance: x / SG: x / pH: x  Gluc: 99 mg/dL / Ketone: x  / Bili: x / Urobili: x   Blood: x / Protein: x / Nitrite: x   Leuk Esterase: x / RBC: x / WBC x   Sq Epi: x / Non Sq Epi: x / Bacteria: x          Creatinine Trend: 0.62<--, 0.61<--, 0.84<--, 0.65<--, 0.74<--, 0.73<--      MICROBIOLOGY:  v  .Sputum  10-07-23   Culture is being performed.  --  --      ET Tube ET Tube  10-07-23   Culture is being performed.  --  --      .Sputum Sputum  10-06-23   No growth  --    Few polymorphonuclear leukocytes per low power field  No Squamous epithelial cells per low power field  Rare Gram Negative Rods per oil power field      Clean Catch Clean Catch (Midstream)  10-06-23   <10,000 CFU/mL Normal Urogenital Lolis  --  --      .Blood Blood-Peripheral  10-06-23   No growth at 5 days  --  --    Culture - Acid Fast - Sputum w/Smear (10.07.23 @ 08:23)    Acid Fast Bacilli Smear:   No acid-fast bacilli seen by fluorochrome stain    Culture - Acid Fast - Sputum w/Smear . (10.07.23 @ 00:20)    Acid Fast Bacilli Smear:   No acid-fast bacilli seen by fluorochrome stain        C-Reactive Protein, Serum: 169 (10-07)      D-Dimer Assay, Quantitative: 282 (10-06)      RADIOLOGY:  < from: CT Chest No Cont (10.12.23 @ 18:23) >  IMPRESSION:  Interval evolution and decrease of diffuse groundglass opacities in   bilateral lungs which is more consolidative in the left upper lobe and is   associated traction bronchiectasis. The finding is in keeping with   organization from prior infection process.    --- End of Report ---            SHELLIE IVEY MD; Attending Radiologist  This document has been electronically signed. Oct 12 2023  7:43PM    < end of copied text >

## 2023-10-13 NOTE — PROGRESS NOTE ADULT - ASSESSMENT
A/P-  53 year old female originally from [facility] with ? past pulmonary TB with 2 months of RIPE treatment in 2009 in [place] , admitted here with sob and fever and hypoxemic and multifocal pneumonia, bronchiectasis.    hospital course-  was intubated and then improved and was extubated 10/9 is now on tele floor on 3L NC  has had 2 negative sputum AFB smears here  quantiferon Gold- Indeterminate result  afebrile  leukocytosis has resolved  sputum cx- negative  Blood cx- neg x 2  HIV ab/ag- negative    Bronchiectais  ? remote h/o pulmonary TB with 2 months RIPE tx in 2009 ( apparently as per puomonologist her sputum AFB was negative in 2009 as well).  multifocal pneumonia  ?? ILD in light of her untreated RA and her improvement with steroids that she was already given during this hospitalization by ICU/pulmonary team.  CT was reviewed by radiologist and per radiologist more c/w bronchiectaisis and pneumonia, no cavitary lesions as per radiologist.    repeat ct from 10/12/2023-Interval evolution and decrease of diffuse groundglass opacities in   bilateral lungs which is more consolidative in the left upper lobe and is   associated traction bronchiectasis. The finding is in keeping with   organization from prior infection process.    Plan-  repeat chest ct is showing some improvement in the diffuse ground glass opacities ( as per radiologist)  await 3rd sputum AFB .  advise to continue with current IV abx zosyn .  steroid use for ? ILD as per pulmonologist , patient was already started on this and as per pulmonologist has improved signifcantly from this.  advise to wait till 3rd sputum AFB and if negative then no objection to this and would advise close pulmonary follow up.   closely with both pulm and rhumatologist .  advised patient if she develops any fever or any night sweats or any hemoptysis or weight loss to notify her pulmonologist immediately.    All labs and imaging and chart notes reviewed.     All above discussed with patient and patient verbalizes full understanding of all above and agrees with above plan of care.    d/w pulmonologist .    Orestes James MD  Infectious Disease Attending    for any questions please do not hesitate to contact me either via teams or by calling [phone]    please Note  is covering the ID service this weekend and if any questions [phone].

## 2023-10-13 NOTE — PROGRESS NOTE ADULT - SUBJECTIVE AND OBJECTIVE BOX
CHIEF COMPLAINT:  ===============      INTERVAL EVENTS:  ==================    - T(F): , Max: 99.7 (10-12-23 @ 16:22)  RR: 18 (10-13-23 @ 05:04)  SpO2: 98% (10-13-23 @ 05:04)  -      REVIEW OF SYSTEMS:  ==================  - no fever, no chills  - no HA, no dizziness  - no visual changes, no auditory changes  - no sore throat, no sinus congestion  - no SOB, no cough  - no chest pain, no palpitations  - no abdominal pain, no N/V/D  - no dysuria, no hematuria  - no myalgias, no arthralgias  - no pain in extremity, no swelling   - no rashes, no pruritis  - no neuro deficits  - no psychiatric concerns       HPI:      OBJECTIVE:  ===========    ICU Vital Signs Last 24 Hrs  T(C): 37.2 (13 Oct 2023 05:04), Max: 37.6 (12 Oct 2023 16:22)  T(F): 98.9 (13 Oct 2023 05:04), Max: 99.7 (12 Oct 2023 16:22)  HR: 88 (13 Oct 2023 05:04) (88 - 119)  BP: 152/87 (13 Oct 2023 05:04) (126/81 - 152/87)  BP(mean): --  ABP: --  ABP(mean): --  RR: 18 (13 Oct 2023 05:04) (18 - 18)  SpO2: 98% (13 Oct 2023 05:04) (95% - 100%)    O2 Parameters below as of 13 Oct 2023 05:04  Patient On (Oxygen Delivery Method): nasal cannula  O2 Flow (L/min): 2            10-12 @ 07:01  -  10-13 @ 07:00  --------------------------------------------------------  IN: 540 mL / OUT: 0 mL / NET: 540 mL      CAPILLARY BLOOD GLUCOSE            PHYSICAL EXAM:  ==============  General:   HEENT:   Respiratory:   Cardiovascular:   Abdomen:   Extremities:   Skin:   Neurological:  Psychiatry:    HOSPITAL MEDICATIONS:  ======================  enoxaparin Injectable 40 milliGRAM(s) SubCutaneous every 24 hours    piperacillin/tazobactam IVPB.. 3.375 Gram(s) IV Intermittent every 8 hours      predniSONE   Tablet 50 milliGRAM(s) Oral daily    albuterol/ipratropium for Nebulization 3 milliLiter(s) Nebulizer every 6 hours PRN    acetaminophen     Tablet .. 650 milliGRAM(s) Oral every 6 hours PRN  ondansetron Injectable 4 milliGRAM(s) IV Push every 8 hours PRN              chlorhexidine 2% Cloths 1 Application(s) Topical <User Schedule>        LABS:  =====                        10.5   9.06  )-----------( 168      ( 12 Oct 2023 06:58 )             32.7     Hgb Trend: 10.5<--, 11.4<--, 10.5<--, 9.7<--, 10.1<--  10-12    141  |  104  |  12  ----------------------------<  99  3.6   |  30  |  0.62    Ca    8.9      12 Oct 2023 06:58  Phos  2.6     10-12  Mg     2.0     10-12    TPro  7.2  /  Alb  2.6<L>  /  TBili  1.1  /  DBili  x   /  AST  36  /  ALT  44  /  AlkPhos  63  10-12    Creatinine Trend: 0.62<--, 0.61<--, 0.84<--, 0.65<--, 0.74<--, 0.73<--    Lactate, Blood: 3.0  Lactate, Blood: 5.9  Lactate, Blood: 6.8  Lactate, Blood: 1.3  Lactate, Blood: 1.0    Urinalysis Basic - ( 12 Oct 2023 06:58 )    Color: x / Appearance: x / SG: x / pH: x  Gluc: 99 mg/dL / Ketone: x  / Bili: x / Urobili: x   Blood: x / Protein: x / Nitrite: x   Leuk Esterase: x / RBC: x / WBC x   Sq Epi: x / Non Sq Epi: x / Bacteria: x              MICROBIOLOGY:     Culture - Sputum (collected 10-06-23 @ 21:00)  Source: .Sputum Sputum  Gram Stain (10-07-23 @ 06:47):    Few polymorphonuclear leukocytes per low power field    No Squamous epithelial cells per low power field    Rare Gram Negative Rods per oil power field  Final Report (10-08-23 @ 16:34):    No growth    Culture - Urine (collected 10-06-23 @ 10:25)  Source: Clean Catch Clean Catch (Midstream)  Final Report (10-07-23 @ 13:38):    <10,000 CFU/mL Normal Urogenital Lolis    Culture - Blood (collected 10-06-23 @ 05:30)  Source: .Blood Blood-Peripheral  Final Report (10-11-23 @ 09:01):    No growth at 5 days    Culture - Blood (collected 10-06-23 @ 05:30)  Source: .Blood Blood-Peripheral  Final Report (10-11-23 @ 09:01):    No growth at 5 days    azithromycin  IVPB 500 once  azithromycin  IVPB 500 every 24 hours  azithromycin  IVPB 500 every 24 hours  cefTRIAXone   IVPB 1000 once  cefTRIAXone   IVPB 1000 every 24 hours  levoFLOXacin IVPB 750 every 24 hours  piperacillin/tazobactam IVPB. 3.375 once  piperacillin/tazobactam IVPB.- 3.375 once  piperacillin/tazobactam IVPB.. 3.375 every 8 hours      RADIOLOGY:  ==========  CT Chest No Cont:   ACC: [id number] EXAM:  CT CHEST   ORDERED BY: MONICA PARNELL     PROCEDURE DATE:  10/12/2023          INTERPRETATION:  CLINICAL INFORMATION: Pneumonia    TECHNIQUE: A volumetric CT acquisition of the chest was obtained from the   thoracic inlet tothe upper abdomen, without administration of   intravenous contrast. This CT scan was performed with one or more of the   following dose optimization: iterative reconstruction, automatic exposure   control, and/or manual adjustment of mAs and kVp according to the   patient's size. 3D MIP and volume-rendered images were created at the   scanner.    COMPARISON: The study was compared to CT chest dated 10/6/2023    FINDINGS:  Lines and Tubes: None    Mediastinum: The thyroid and thoracic inlet are normal. There is no   enlarged axiliary, mediastinal, or hilar lymph nodes. The heart size is   within normal limits. There is no pericardial effusion. The aorta is   normal in course and caliber, with scattered calcified atheromas. The   esophagus is unremarkable.    Lung: There is secretion in the trachea. There is redemonstration of   diffuse groundglass opacities in bilateral lungs, sparing of anterior   segment of right upper lobe. Foci of groundglass opacity has decreased   compared to prior study. These foci are more consolidative in the left   upper lobe. In addition there are traction bronchiectasis, likely due to   or organization.    Pleura: There is no pleural effusion or pneumothorax.    Abdomen: Limited evaluation of liver, spleen, pancreas, adrenal glands,   and upper pole of kidneys is unremarkable.    Bone and Soft Tissue: There is no evidence of osteosclerotic or   osteolytic lesions. There is a straightening of the normal thoracic   kyphosis. The soft tissue is grossly normal.    IMPRESSION:  Interval evolution and decrease of diffuse groundglass opacities in   bilateral lungs which is more consolidative in the left upper lobe and is   associated traction bronchiectasis. The finding is in keeping with   organization from prior infection process.    --- End of Report ---            SHELLIE IVEY MD; Attending Radiologist  This document has been electronically signed. Oct 12 2023  7:43PM (10-12-23 @ 18:23)    PULMONARY:  ============    albuterol/ipratropium for Nebulization 3 Nebulizer every 6 hours  albuterol/ipratropium for Nebulization 3 Nebulizer every 6 hours PRN  albuterol/ipratropium for Nebulization. 3 Nebulizer once  albuterol/ipratropium for Nebulization. 3 Nebulizer once  albuterol/ipratropium for Nebulization.. 3 Nebulizer every 20 minutes      CARDIAC:  ========   CHIEF COMPLAINT:  ===============      INTERVAL EVENTS:  ==================    - ambulated 200feet with O2 this Am denies SOB   -on room air o2 saturation dropped to 85%  - T(F): , Max: 99.7 (10-12-23 @ 16:22)  RR: 18 (10-13-23 @ 05:04)  SpO2: 98% (10-13-23 @ 05:04)      REVIEW OF SYSTEMS:  ==================  - no fever, no chills  - no HA, no dizziness  - no visual changes, no auditory changes  - no sore throat, no sinus congestion  - no SOB, no cough  - no chest pain, no palpitations  - no abdominal pain, no N/V/D  - no dysuria, no hematuria  - no myalgias, no arthralgias  - no pain in extremity, no swelling   - no rashes, no pruritis  - no neuro deficits  - no psychiatric concerns       HPI:  54 yo woman with RA, possible stan TB with unclear treatment duration, pulmonary fibrosis with with now acute hypoxic respiratory failure, with CT findings of opacity GGO, Fibrosis, intralobular septal thickening, and high fever initially concern for PNA vs AIP with rapid resolution.  Possibly ILD flare//AIP. 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    Extubated 10/9  Quantiferon indeterminate           OBJECTIVE:  ===========    ICU Vital Signs Last 24 Hrs  T(C): 37.2 (13 Oct 2023 05:04), Max: 37.6 (12 Oct 2023 16:22)  T(F): 98.9 (13 Oct 2023 05:04), Max: 99.7 (12 Oct 2023 16:22)  HR: 88 (13 Oct 2023 05:04) (88 - 119)  BP: 152/87 (13 Oct 2023 05:04) (126/81 - 152/87)  RR: 18 (13 Oct 2023 05:04) (18 - 18)  SpO2: 98% (13 Oct 2023 05:04) (95% - 100%)    O2 Parameters below as of 13 Oct 2023 05:04  Patient On (Oxygen Delivery Method): nasal cannula  O2 Flow (L/min): 2            10-12 @ 07:01  -  10-13 @ 07:00  --------------------------------------------------------  IN: 540 mL / OUT: 0 mL / NET: 540 mL      CAPILLARY BLOOD GLUCOSE      PHYSICAL EXAM:  ==============  General: alert , awake with out complaint   HEENT: PERRL, EOMI intact, scar on left neck from line placement   Respiratory: CTA B/L   Cardiovascular: S1S2 RRR   Abdomen: soft + BS  Extremities: no edema   Skin: Intact  Neurological: no deficits       HOSPITAL MEDICATIONS:  ======================  enoxaparin Injectable 40 milliGRAM(s) SubCutaneous every 24 hours  piperacillin/tazobactam IVPB.. 3.375 Gram(s) IV Intermittent every 8 hours  predniSONE   Tablet 50 milliGRAM(s) Oral daily  albuterol/ipratropium for Nebulization 3 milliLiter(s) Nebulizer every 6 hours PRN  acetaminophen     Tablet .. 650 milliGRAM(s) Oral every 6 hours PRN  ondansetron Injectable 4 milliGRAM(s) IV Push every 8 hours PRN  chlorhexidine 2% Cloths 1 Application(s) Topical <User Schedule>        LABS:  =====                        10.5   9.06  )-----------( 168      ( 12 Oct 2023 06:58 )             32.7     Hgb Trend: 10.5<--, 11.4<--, 10.5<--, 9.7<--, 10.1<--  10-12    141  |  104  |  12  ----------------------------<  99  3.6   |  30  |  0.62    Ca    8.9      12 Oct 2023 06:58  Phos  2.6     10-12  Mg     2.0     10-12    TPro  7.2  /  Alb  2.6<L>  /  TBili  1.1  /  DBili  x   /  AST  36  /  ALT  44  /  AlkPhos  63  10-12    Creatinine Trend: 0.62<--, 0.61<--, 0.84<--, 0.65<--, 0.74<--, 0.73<--    Lactate, Blood: 3.0  Lactate, Blood: 5.9  Lactate, Blood: 6.8  Lactate, Blood: 1.3  Lactate, Blood: 1.0    Urinalysis Basic - ( 12 Oct 2023 06:58 )    Color: x / Appearance: x / SG: x / pH: x  Gluc: 99 mg/dL / Ketone: x  / Bili: x / Urobili: x   Blood: x / Protein: x / Nitrite: x   Leuk Esterase: x / RBC: x / WBC x   Sq Epi: x / Non Sq Epi: x / Bacteria: x    MICROBIOLOGY:     Culture - Sputum (collected 10-06-23 @ 21:00)  Source: .Sputum Sputum  Gram Stain (10-07-23 @ 06:47):    Few polymorphonuclear leukocytes per low power field    No Squamous epithelial cells per low power field    Rare Gram Negative Rods per oil power field  Final Report (10-08-23 @ 16:34):    No growth    Culture - Urine (collected 10-06-23 @ 10:25)  Source: Clean Catch Clean Catch (Midstream)  Final Report (10-07-23 @ 13:38):    <10,000 CFU/mL Normal Urogenital Lolis    Culture - Blood (collected 10-06-23 @ 05:30)  Source: .Blood Blood-Peripheral  Final Report (10-11-23 @ 09:01):    No growth at 5 days    Culture - Blood (collected 10-06-23 @ 05:30)  Source: .Blood Blood-Peripheral  Final Report (10-11-23 @ 09:01):    No growth at 5 days    azithromycin  IVPB 500 once  cefTRIAXone   IVPB 1000 every 24 hours  levoFLOXacin IVPB 750 every 24 hours  piperacillin/tazobactam IVPB.. 3.375 every 8 hours      RADIOLOGY:  ==========  CT Chest No Cont:   ACC: [id number] EXAM:  CT CHEST   ORDERED BY: MONICA PARNELL     PROCEDURE DATE:  10/12/2023      INTERPRETATION:  CLINICAL INFORMATION: Pneumonia    TECHNIQUE: A volumetric CT acquisition of the chest was obtained from the   thoracic inlet tothe upper abdomen, without administration of   intravenous contrast. This CT scan was performed with one or more of the   following dose optimization: iterative reconstruction, automatic exposure   control, and/or manual adjustment of mAs and kVp according to the   patient's size. 3D MIP and volume-rendered images were created at the   scanner.    COMPARISON: The study was compared to CT chest dated 10/6/2023    FINDINGS:  Lines and Tubes: None    Mediastinum: The thyroid and thoracic inlet are normal. There is no   enlarged axiliary, mediastinal, or hilar lymph nodes. The heart size is   within normal limits. There is no pericardial effusion. The aorta is   normal in course and caliber, with scattered calcified atheromas. The   esophagus is unremarkable.    Lung: There is secretion in the trachea. There is redemonstration of   diffuse groundglass opacities in bilateral lungs, sparing of anterior   segment of right upper lobe. Foci of groundglass opacity has decreased   compared to prior study. These foci are more consolidative in the left   upper lobe. In addition there are traction bronchiectasis, likely due to   or organization.    Pleura: There is no pleural effusion or pneumothorax.    Abdomen: Limited evaluation of liver, spleen, pancreas, adrenal glands,   and upper pole of kidneys is unremarkable.    Bone and Soft Tissue: There is no evidence of osteosclerotic or   osteolytic lesions. There is a straightening of the normal thoracic   kyphosis. The soft tissue is grossly normal.    IMPRESSION:  Interval evolution and decrease of diffuse groundglass opacities in   bilateral lungs which is more consolidative in the left upper lobe and is   associated traction bronchiectasis. The finding is in keeping with   organization from prior infection process.    --- End of Report ---  This document has been electronically signed. Oct 12 2023  7:43PM (10-12-23 @ 18:23)    PULMONARY:  ============    albuterol/ipratropium for Nebulization 3 Nebulizer every 6 hours    albuterol/ipratropium for Nebulization.. 3 Nebulizer every 20 minutes      CARDIAC:  ========  e< from: TTE Echo Complete w/o Contrast w/ Doppler (10.07.23 @ 10:57) >  Summary:   1. Left ventricular ejection fraction, by visual estimation, is 55 to   60%.   2. Technically adequate study.   3. Normal global left ventricular systolic function.   4. Mildly increased LV wall thickness.   5. Normal left ventricular internal cavity size.   6. Mildly reduced RV systolic function.   7. Moderately enlarged right ventricle.   8. Mildly enlarged right atrium.   9. Normal left atrial size.  10. There is no evidence of pericardial effusion.  11. Structurally normal mitral valve, with normal leaflet excursion.  12. Trace mitral valve regurgitation.  13. Moderate-severe tricuspid regurgitation.  14. Mild aortic valve stenosis.  15. Estimated pulmonary artery systolic pressure is 64.4 mmHg assuming a   right atrial pressure of 8 mmHg, which is consistent with severe   pulmonary hypertension.    < end of copied text >

## 2023-10-14 LAB
NIGHT BLUE STAIN TISS: SIGNIFICANT CHANGE UP
SPECIMEN SOURCE: SIGNIFICANT CHANGE UP

## 2023-10-14 PROCEDURE — 99232 SBSQ HOSP IP/OBS MODERATE 35: CPT

## 2023-10-14 RX ORDER — PANTOPRAZOLE SODIUM 20 MG/1
40 TABLET, DELAYED RELEASE ORAL
Refills: 0 | Status: DISCONTINUED | OUTPATIENT
Start: 2023-10-14 | End: 2023-10-18

## 2023-10-14 RX ADMIN — Medication 1 TABLET(S): at 17:23

## 2023-10-14 RX ADMIN — Medication 30 MILLIGRAM(S): at 14:53

## 2023-10-14 RX ADMIN — Medication 50 MILLIGRAM(S): at 05:45

## 2023-10-14 RX ADMIN — Medication 30 MILLIGRAM(S): at 22:05

## 2023-10-14 RX ADMIN — CHLORHEXIDINE GLUCONATE 1 APPLICATION(S): 213 SOLUTION TOPICAL at 05:45

## 2023-10-14 RX ADMIN — ENOXAPARIN SODIUM 40 MILLIGRAM(S): 100 INJECTION SUBCUTANEOUS at 11:58

## 2023-10-14 RX ADMIN — Medication 30 MILLIGRAM(S): at 05:45

## 2023-10-14 NOTE — PROGRESS NOTE ADULT - ASSESSMENT
Pt is a 52 yo BF with h/o poss RA presented with AHRF 2 to PNA. On day of admission pt deteriorated and had RRT; pt intubated and pt transferred to ICU. CT chest showed severe GGO/consolidations and bronchiectasis. Pt met criteria for ARDS; s/p proning. Currently oxygenation improved. s/p extubation 10/9    Resp:   acute resp failure- with hypoxia presumed secondary to pna-ards with possibly a autoimmune rheum component   s/p extubation   currently on 2 lpm  continue antibx   and steroids appreciate pulm and rheum consult   10/13/2023 appreciate rheum consult   check room air sat rest and ambulation  10/14/2023 still requires oxygen has no insurance was to be seen by financial dept as per  pt has medicaid pending   will also ask physical therapy to see pt again to establish if still acute rehab vs outpt rehab   slow prednisone taper over months ensure on PPI and calcium /vitamin d     ID: Cx NTD; finish course of Zosyn for a total of 7 days/ ID consult pt with remote h/o TB Rx and pt may need to be started on chronic Steroids  10/14/2023 s/p antibx      [facility]: May need to start antiHTN med/ Check EKG pt tachycardic   10/13/2023 will start Cardizem   10/14/2023 hr better       Heme: DVT prophylaxis on lovenox

## 2023-10-14 NOTE — PROGRESS NOTE ADULT - SUBJECTIVE AND OBJECTIVE BOX
Per pt, he already spoke to Dr. Umanzor.  Pt also made aware order for pulmonary testing is in, verbalized understanding.    Patient is a 53y old  Female who presents with a chief complaint of hypoxic respiratory failure, multifocal pneumonia (12 Oct 2023 12:07)      OVERNIGHT EVENTS:      MEDICATIONS  (STANDING):  chlorhexidine 2% Cloths 1 Application(s) Topical <User Schedule>  diltiazem    Tablet 30 milliGRAM(s) Oral every 8 hours  enoxaparin Injectable 40 milliGRAM(s) SubCutaneous every 24 hours  predniSONE   Tablet 50 milliGRAM(s) Oral daily    MEDICATIONS  (PRN):  acetaminophen     Tablet .. 650 milliGRAM(s) Oral every 6 hours PRN Temp greater or equal to 38C (100.4F), Mild Pain (1 - 3), Moderate Pain (4 - 6)  albuterol/ipratropium for Nebulization 3 milliLiter(s) Nebulizer every 6 hours PRN Shortness of Breath and/or Wheezing  ondansetron Injectable 4 milliGRAM(s) IV Push every 8 hours PRN Nausea and/or Vomiting    Allergies    No Known Drug Allergies  Tomatoes (Urticaria)  strawberries, tomatoes, chocolate; reaction of cough, itchy throat (Other)    Intolerances        SUBJECTIVE: in bed in NAD, no acute events overnight     Vital Signs Last 24 Hrs  T(C): 36.9 (14 Oct 2023 10:20), Max: 37.2 (13 Oct 2023 16:30)  T(F): 98.5 (14 Oct 2023 10:20), Max: 99 (13 Oct 2023 16:30)  HR: 91 (14 Oct 2023 10:20) (69 - 109)  BP: 134/80 (14 Oct 2023 10:20) (131/84 - 147/86)  BP(mean): --  RR: 18 (14 Oct 2023 10:20) (18 - 18)  SpO2: 98% (14 Oct 2023 10:20) (97% - 100%)    Parameters below as of 14 Oct 2023 04:53  Patient On (Oxygen Delivery Method): nasal cannula  O2 Flow (L/min): 2      PHYSICAL EXAM:  GENERAL: NAD, well-groomed, well-developed  HEAD:  Atraumatic, Normocephalic  EYES: EOMI, PERRLA, conjunctiva and sclera clear  ENMT: No tonsillar erythema, exudates, or enlargement; Moist mucous membranes, Good dentition, No lesions  NECK: Supple,   CHEST/LUNG: decreased bs at bases; No rales, rhonchi, wheezing, or rubs  bilaterally  HEART: Regular rate and rhythm; No murmurs, rubs, or gallops  ABDOMEN: Soft, Nontender, Nondistended; Bowel sounds present  EXTREMITIES:  2+ Peripheral Pulses, No clubbing, cyanosis, or edema BL LE  SKIN: No rashes or lesions  NERVOUS SYSTEM:  Alert & Oriented X3, Good concentration; Motor Strength 5/5 B/L upper and lower extremities;   DTRs 2+ intact and symmetric, sensation intact BL    LABS:              Urinalysis Basic - ( 12 Oct 2023 06:58 )    Color: x / Appearance: x / SG: x / pH: x  Gluc: 99 mg/dL / Ketone: x  / Bili: x / Urobili: x   Blood: x / Protein: x / Nitrite: x   Leuk Esterase: x / RBC: x / WBC x   Sq Epi: x / Non Sq Epi: x / Bacteria: x      Cultures;   CAPILLARY BLOOD GLUCOSE        Lipid panel:           RADIOLOGY & ADDITIONAL TESTS:  < from: TTE Echo Complete w/o Contrast w/ Doppler (10.07.23 @ 10:57) >  Summary:   1. Left ventricular ejection fraction, by visual estimation, is 55 to   60%.   2. Technically adequate study.   3. Normal global left ventricular systolic function.   4. Mildly increased LV wall thickness.   5. Normal left ventricular internal cavity size.   6. Mildly reduced RV systolic function.   7. Moderately enlarged right ventricle.   8. Mildly enlarged right atrium.   9. Normal left atrial size.  10. There is no evidence of pericardial effusion.  11. Structurally normal mitral valve, with normal leaflet excursion.  12. Trace mitral valve regurgitation.  13. Moderate-severe tricuspid regurgitation.  14. Mild aortic valve stenosis.  15. Estimated pulmonary artery systolic pressure is 64.4 mmHg assuming a   right atrial pressure of 8 mmHg, which is consistent with severe   pulmonary hypertension.    < end of copied text >

## 2023-10-15 PROCEDURE — 99233 SBSQ HOSP IP/OBS HIGH 50: CPT

## 2023-10-15 PROCEDURE — 99232 SBSQ HOSP IP/OBS MODERATE 35: CPT

## 2023-10-15 RX ADMIN — PANTOPRAZOLE SODIUM 40 MILLIGRAM(S): 20 TABLET, DELAYED RELEASE ORAL at 06:01

## 2023-10-15 RX ADMIN — Medication 1 TABLET(S): at 06:01

## 2023-10-15 RX ADMIN — ENOXAPARIN SODIUM 40 MILLIGRAM(S): 100 INJECTION SUBCUTANEOUS at 11:16

## 2023-10-15 RX ADMIN — Medication 1 TABLET(S): at 17:16

## 2023-10-15 RX ADMIN — Medication 50 MILLIGRAM(S): at 06:00

## 2023-10-15 RX ADMIN — Medication 30 MILLIGRAM(S): at 21:40

## 2023-10-15 RX ADMIN — Medication 30 MILLIGRAM(S): at 06:00

## 2023-10-15 RX ADMIN — CHLORHEXIDINE GLUCONATE 1 APPLICATION(S): 213 SOLUTION TOPICAL at 11:15

## 2023-10-15 RX ADMIN — Medication 30 MILLIGRAM(S): at 13:12

## 2023-10-15 NOTE — PROGRESS NOTE ADULT - SUBJECTIVE AND OBJECTIVE BOX
Patient is a 53y old  Female who presents with a chief complaint of hypoxic respiratory failure, multifocal pneumonia (12 Oct 2023 12:07)      OVERNIGHT EVENTS:  none   TODAY- desaturates with ambulation requiring     MEDICATIONS  (STANDING):  calcium carbonate 1250 mG  + Vitamin D (OsCal 500 + D) 1 Tablet(s) Oral two times a day  chlorhexidine 2% Cloths 1 Application(s) Topical <User Schedule>  diltiazem    Tablet 30 milliGRAM(s) Oral every 8 hours  enoxaparin Injectable 40 milliGRAM(s) SubCutaneous every 24 hours  pantoprazole    Tablet 40 milliGRAM(s) Oral before breakfast  predniSONE   Tablet 50 milliGRAM(s) Oral daily    MEDICATIONS  (PRN):  acetaminophen     Tablet .. 650 milliGRAM(s) Oral every 6 hours PRN Temp greater or equal to 38C (100.4F), Mild Pain (1 - 3), Moderate Pain (4 - 6)  albuterol/ipratropium for Nebulization 3 milliLiter(s) Nebulizer every 6 hours PRN Shortness of Breath and/or Wheezing  ondansetron Injectable 4 milliGRAM(s) IV Push every 8 hours PRN Nausea and/or Vomiting    Allergies    No Known Drug Allergies  Tomatoes (Urticaria)  strawberries, tomatoes, chocolate; reaction of cough, itchy throat (Other)    Intolerances        SUBJECTIVE: in bed in NAD, no acute events overnight     Vital Signs Last 24 Hrs  T(C): 37.1 (15 Oct 2023 10:33), Max: 37.6 (14 Oct 2023 23:54)  T(F): 98.8 (15 Oct 2023 10:33), Max: 99.6 (14 Oct 2023 23:54)  HR: 127 (15 Oct 2023 10:33) (74 - 136)  BP: 127/85 (15 Oct 2023 10:33) (116/79 - 136/87)  BP(mean): 103 (15 Oct 2023 04:39) (89 - 103)  RR: 20 (15 Oct 2023 10:33) (18 - 20)  SpO2: 98% (15 Oct 2023 10:33) (81% - 98%)    Parameters below as of 15 Oct 2023 10:01  Patient On (Oxygen Delivery Method): nasal cannula  O2 Flow (L/min): 2    PHYSICAL EXAM:  GENERAL: NAD, well-groomed, well-developed  HEAD:  Atraumatic, Normocephalic  EYES: EOMI, PERRLA, conjunctiva and sclera clear  ENMT: No tonsillar erythema, exudates, or enlargement; Moist mucous membranes, Good dentition, No lesions  NECK: Supple,   CHEST/LUNG: decreased bs at bases; No rales, rhonchi, wheezing, or rubs  bilaterally  HEART: Regular rate and rhythm; No murmurs, rubs, or gallops  ABDOMEN: Soft, Nontender, Nondistended; Bowel sounds present  EXTREMITIES:  2+ Peripheral Pulses, No clubbing, cyanosis, or edema BL LE  SKIN: No rashes or lesions  NERVOUS SYSTEM:  Alert & Oriented X3, Good concentration; Motor Strength 5/5 B/L upper and lower extremities;   DTRs 2+ intact and symmetric, sensation intact BL    LABS:              Urinalysis Basic - ( 12 Oct 2023 06:58 )    Color: x / Appearance: x / SG: x / pH: x  Gluc: 99 mg/dL / Ketone: x  / Bili: x / Urobili: x   Blood: x / Protein: x / Nitrite: x   Leuk Esterase: x / RBC: x / WBC x   Sq Epi: x / Non Sq Epi: x / Bacteria: x      Cultures;   CAPILLARY BLOOD GLUCOSE        Lipid panel:           RADIOLOGY & ADDITIONAL TESTS:  < from: TTE Echo Complete w/o Contrast w/ Doppler (10.07.23 @ 10:57) >  Summary:   1. Left ventricular ejection fraction, by visual estimation, is 55 to   60%.   2. Technically adequate study.   3. Normal global left ventricular systolic function.   4. Mildly increased LV wall thickness.   5. Normal left ventricular internal cavity size.   6. Mildly reduced RV systolic function.   7. Moderately enlarged right ventricle.   8. Mildly enlarged right atrium.   9. Normal left atrial size.  10. There is no evidence of pericardial effusion.  11. Structurally normal mitral valve, with normal leaflet excursion.  12. Trace mitral valve regurgitation.  13. Moderate-severe tricuspid regurgitation.  14. Mild aortic valve stenosis.  15. Estimated pulmonary artery systolic pressure is 64.4 mmHg assuming a   right atrial pressure of 8 mmHg, which is consistent with severe   pulmonary hypertension.    < end of copied text >

## 2023-10-15 NOTE — PROGRESS NOTE ADULT - ASSESSMENT
Pt is a 54 yo BF with h/o poss RA presented with AHRF 2 to PNA. On day of admission pt deteriorated and had RRT; pt intubated and pt transferred to ICU. CT chest showed severe GGO/consolidations and bronchiectasis. Pt met criteria for ARDS; s/p proning. Currently oxygenation improved. s/p extubation 10/9    Resp:   acute resp failure- with hypoxia presumed secondary to pna-ards with possibly a autoimmune rheum component   s/p extubation   currently on 2 lpm  continue antibx   and steroids appreciate pulm and rheum consult   10/13/2023 appreciate rheum consult   check room air sat rest and ambulation  10/14/2023 still requires oxygen has no insurance was to be seen by financial dept as per  pt has medicaid pending   will also ask physical therapy to see pt again to establish if still acute rehab vs outpt rehab   slow prednisone taper over months ensure on PPI and calcium /vitamin d     ID: Cx NTD; finish course of Zosyn for a total of 7 days/ ID consult pt with remote h/o TB Rx and pt may need to be started on chronic Steroids  10/14/2023 s/p antibx      [facility]: May need to start antiHTN med/ Check EKG pt tachycardic   10/13/2023 will start Cardizem   10/14/2023 hr better       Heme: DVT prophylaxis on lovenox

## 2023-10-15 NOTE — PROGRESS NOTE ADULT - ASSESSMENT
53 year old female w/ hx of RA presented with dyspnea.  CT chest revealed GGO, Fibrosis, and intralobular septal thickening.  Symptoms have improved rapidly on steroids, suggestive of ILD flare, possibly secondary to RA.  RA currently clinically quiescent.   Pt also w/ (+)LORA, though no other obvious signs/symptoms of another underlying connective tissue disorder.  Further w/u revealed (+)TPO AB, c/w autoimmune thyroiditis w/ low TSH.    - Cont prednisone, as per pulmonology.    - Recommend that pt follow up with me as an outpatient to discuss DMARD initiation.    - f/u rest of serologies.      - Consider endocrine eval for autoimmune thyroiditis.

## 2023-10-16 ENCOUNTER — TRANSCRIPTION ENCOUNTER (OUTPATIENT)
Age: 53
End: 2023-10-16

## 2023-10-16 LAB — DSDNA AB SER-ACNC: <12 IU/ML — SIGNIFICANT CHANGE UP

## 2023-10-16 PROCEDURE — 99233 SBSQ HOSP IP/OBS HIGH 50: CPT

## 2023-10-16 PROCEDURE — 99232 SBSQ HOSP IP/OBS MODERATE 35: CPT

## 2023-10-16 PROCEDURE — 99231 SBSQ HOSP IP/OBS SF/LOW 25: CPT

## 2023-10-16 RX ADMIN — Medication 1 TABLET(S): at 22:19

## 2023-10-16 RX ADMIN — Medication 1 TABLET(S): at 15:52

## 2023-10-16 RX ADMIN — Medication 50 MILLIGRAM(S): at 05:29

## 2023-10-16 RX ADMIN — Medication 1 TABLET(S): at 05:29

## 2023-10-16 RX ADMIN — Medication 30 MILLIGRAM(S): at 21:39

## 2023-10-16 RX ADMIN — CHLORHEXIDINE GLUCONATE 1 APPLICATION(S): 213 SOLUTION TOPICAL at 05:40

## 2023-10-16 RX ADMIN — Medication 30 MILLIGRAM(S): at 13:33

## 2023-10-16 RX ADMIN — Medication 30 MILLIGRAM(S): at 05:29

## 2023-10-16 RX ADMIN — PANTOPRAZOLE SODIUM 40 MILLIGRAM(S): 20 TABLET, DELAYED RELEASE ORAL at 05:40

## 2023-10-16 RX ADMIN — ENOXAPARIN SODIUM 40 MILLIGRAM(S): 100 INJECTION SUBCUTANEOUS at 11:07

## 2023-10-16 RX ADMIN — Medication 1 TABLET(S): at 17:34

## 2023-10-16 NOTE — PROGRESS NOTE ADULT - ASSESSMENT
A/P-  53 year old female originally from [facility] with ? past pulmonary TB with 2 months of RIPE treatment in 2009 in [place] , admitted here with sob and fever and hypoxemic and multifocal pneumonia, bronchiectasis.    hospital course-  was intubated and then improved and was extubated 10/9 is now on tele floor was on 3L nC but past 24 hours on RA and saturating well  has had 2 negative sputum AFB smears here  quantiferon Gold- Indeterminate result  afebrile  leukocytosis has resolved  sputum cx- negative  Blood cx- neg x 2  HIV ab/ag- negative    Bronchiectais  ? remote h/o pulmonary TB with 2 months RIPE tx in 2009 ( apparently as per puomonologist her sputum AFB was negative in 2009 as well).  multifocal pneumonia  ?? ILD in light of her untreated RA and her improvement with steroids that she was already given during this hospitalization by ICU/pulmonary team.  CT was reviewed by radiologist and per radiologist more c/w bronchiectaisis and pneumonia, no cavitary lesions as per radiologist.    repeat ct from 10/12/2023-Interval evolution and decrease of diffuse groundglass opacities in   bilateral lungs which is more consolidative in the left upper lobe and is   associated traction bronchiectasis. The finding is in keeping with   organization from prior infection process.    clinically much improved  afebrile  saturating well on RA  3rd sputum AFB smear- negative      Plan-  completed 7 days of zosyn.  off abx now.  patient is being d/c home today on po prednisone as per her pulmonologist for presumed ILD.  she will follow up with pulmonologist and Rhumatologist for ILD and RA care respectively.  she will need to have follow up chest CT as outpatient.  advised patient if she develops any fever or any night sweats or any hemoptysis or weight loss to notify her pulmonologist immediately.  advised patient to also follow up with me in ID clinic.    All labs and imaging and chart notes reviewed.     All above discussed with patient and patient verbalizes full understanding of all above and agrees with above plan of care.      Orestes James MD  Infectious Disease Attending    for any questions please do not hesitate to contact me either via teams or by calling [phone]

## 2023-10-16 NOTE — PROGRESS NOTE ADULT - SUBJECTIVE AND OBJECTIVE BOX
CHIEF COMPLAINT:Patient is a 53y old  Female who presents with a chief complaint of hypoxic respiratory failure, multifocal pneumonia (16 Oct 2023 10:23)      Interval Events:    REVIEW OF SYSTEMS:  [x] All other systems negative except per HPI   [ ] Unable to assess ROS because ________    OBJECTIVE:  ICU Vital Signs Last 24 Hrs  T(C): 37 (16 Oct 2023 11:03), Max: 37.3 (16 Oct 2023 04:45)  T(F): 98.6 (16 Oct 2023 11:03), Max: 99.1 (16 Oct 2023 04:45)  HR: 86 (16 Oct 2023 11:03) (86 - 125)  BP: 141/83 (16 Oct 2023 11:03) (118/84 - 158/92)  BP(mean): 111 (15 Oct 2023 16:38) (111 - 111)  ABP: --  ABP(mean): --  RR: 18 (16 Oct 2023 11:03) (18 - 20)  SpO2: 92% (16 Oct 2023 11:03) (92% - 94%)    O2 Parameters below as of 16 Oct 2023 11:03  Patient On (Oxygen Delivery Method): room air                PHYSICAL EXAM:  GENERAL: NAD, well-groomed, well-developed  HEAD:  Atraumatic, Normocephalic  EYES: EOMI, PERRLA, conjunctiva and sclera clear  ENMT: No tonsillar erythema, exudates, or enlargement; Moist mucous membranes, Good dentition, No lesions  NECK: Supple, No JVD, Normal thyroid  CHEST/LUNG: Clear to auscultation bilaterally; No rales, rhonchi, wheezing, or rubs  HEART: Regular rate and rhythm; No murmurs, rubs, or gallops  ABDOMEN: Soft, Nontender, Nondistended; Bowel sounds present  VASCULAR:  2+ Peripheral Pulses, No clubbing, cyanosis, or edema  LYMPH: No lymphadenopathy noted  SKIN: No rashes or lesions  NERVOUS SYSTEM:  Alert & Oriented X3, Good concentration; Motor Strength 5/5 B/L upper and lower extremities; DTRs 2+ intact and symmetric    HOSPITAL MEDICATIONS:  MEDICATIONS  (STANDING):  calcium carbonate 1250 mG  + Vitamin D (OsCal 500 + D) 1 Tablet(s) Oral two times a day  chlorhexidine 2% Cloths 1 Application(s) Topical <User Schedule>  diltiazem    Tablet 30 milliGRAM(s) Oral every 8 hours  enoxaparin Injectable 40 milliGRAM(s) SubCutaneous every 24 hours  pantoprazole    Tablet 40 milliGRAM(s) Oral before breakfast  predniSONE   Tablet 50 milliGRAM(s) Oral daily    MEDICATIONS  (PRN):  acetaminophen     Tablet .. 650 milliGRAM(s) Oral every 6 hours PRN Temp greater or equal to 38C (100.4F), Mild Pain (1 - 3), Moderate Pain (4 - 6)  albuterol/ipratropium for Nebulization 3 milliLiter(s) Nebulizer every 6 hours PRN Shortness of Breath and/or Wheezing  ondansetron Injectable 4 milliGRAM(s) IV Push every 8 hours PRN Nausea and/or Vomiting      LABS:    The Labs were reviewed by me   The Radiology was reviewed by me    EKG tracing reviewed by me                                    CAPILLARY BLOOD GLUCOSE            MICROBIOLOGY:     RADIOLOGY:  [ ] Reviewed and interpreted by me    Point of Care Ultrasound Findings:    PFT:    EKG:

## 2023-10-16 NOTE — DISCHARGE NOTE NURSING/CASE MANAGEMENT/SOCIAL WORK - NSDCFUADDAPPT_GEN_ALL_CORE_FT
Appointment with [facility]   Monday Oct 23rd between 6-9pm  [address] [place] [address]  2nd Floor, [address]  [phone]

## 2023-10-16 NOTE — CHART NOTE - NSCHARTNOTEFT_GEN_A_CORE
Pt dessated during oxygen testing due to diagnosis of acute respiratory failure with hypoxia and will require oxygen for discharge . Pt is mobile in the home and will require a portable system.

## 2023-10-16 NOTE — DISCHARGE NOTE NURSING/CASE MANAGEMENT/SOCIAL WORK - NSDCPEFALRISK_GEN_ALL_CORE
For information on Fall & Injury Prevention, visit: [email or web address] OR  [email or web address] OR  https://www.cdc.gov/steadi/patient.html

## 2023-10-16 NOTE — PROGRESS NOTE ADULT - ASSESSMENT
54 yo female with RA, class 4 TB s/p treatement, ILD admitted for acute hypoxic respiratory failure with concern for PNA vs AIP vs ILD flare vs Hypersenstivitiy pneumonitis      Plan:  - patient extubated 10/9  - pt weaned from 3LNC to RA satting 92%  - with ambulation on room air the patients o2 saturation dropped to 85%  - pt rapidly improved with steroids, making it less likely there was infectious component as all cx were also negative. a  - will complete 7 days of Zosyn today  - records from [facility] - patient complete 2  months of RIPE for class 4 TB in 2009.  - QuantiFeron test- indeterminate  - c/w Prednisone 50mg once a day   - rheum consulted, will continue on steroids for RA and initiate DMARDS with outpt follow up   - Started on PCP PPX for bactrim   On discharge patient can follow up at [facility] at [place]    Pulmonary/Sleep Clinic  [address] [place], [place] [address] [phone]

## 2023-10-16 NOTE — DISCHARGE NOTE NURSING/CASE MANAGEMENT/SOCIAL WORK - PATIENT PORTAL LINK FT
You can access the FollowMyHealth Patient Portal offered by [facility] by registering at the following website: [email or web address]. By joining [facility]’s FollowMyHealth portal, you will also be able to view your health information using other applications (apps) compatible with our system.

## 2023-10-16 NOTE — PROGRESS NOTE ADULT - SUBJECTIVE AND OBJECTIVE BOX
Patient is a 53y old  Female who presents with a chief complaint of hypoxic respiratory failure, multifocal pneumonia (12 Oct 2023 12:07)      OVERNIGHT EVENTS:    MEDICATIONS  (STANDING):  calcium carbonate 1250 mG  + Vitamin D (OsCal 500 + D) 1 Tablet(s) Oral two times a day  chlorhexidine 2% Cloths 1 Application(s) Topical <User Schedule>  diltiazem    Tablet 30 milliGRAM(s) Oral every 8 hours  enoxaparin Injectable 40 milliGRAM(s) SubCutaneous every 24 hours  pantoprazole    Tablet 40 milliGRAM(s) Oral before breakfast  predniSONE   Tablet 50 milliGRAM(s) Oral daily    MEDICATIONS  (PRN):  acetaminophen     Tablet .. 650 milliGRAM(s) Oral every 6 hours PRN Temp greater or equal to 38C (100.4F), Mild Pain (1 - 3), Moderate Pain (4 - 6)  albuterol/ipratropium for Nebulization 3 milliLiter(s) Nebulizer every 6 hours PRN Shortness of Breath and/or Wheezing  ondansetron Injectable 4 milliGRAM(s) IV Push every 8 hours PRN Nausea and/or Vomiting    Allergies    No Known Drug Allergies  Tomatoes (Urticaria)  strawberries, tomatoes, chocolate; reaction of cough, itchy throat (Other)    Intolerances        SUBJECTIVE: in bed in NAD, no acute events overnight     Vital Signs Last 24 Hrs  T(C): 37.3 (16 Oct 2023 04:45), Max: 37.3 (16 Oct 2023 04:45)  T(F): 99.1 (16 Oct 2023 04:45), Max: 99.1 (16 Oct 2023 04:45)  HR: 93 (16 Oct 2023 04:45) (93 - 136)  BP: 138/81 (16 Oct 2023 04:45) (118/84 - 158/92)  BP(mean): 111 (15 Oct 2023 16:38) (111 - 111)  RR: 18 (16 Oct 2023 04:45) (18 - 20)  SpO2: 93% (16 Oct 2023 04:45) (81% - 98%)    Parameters below as of 16 Oct 2023 04:45  Patient On (Oxygen Delivery Method): room air      PHYSICAL EXAM:  GENERAL: NAD, well-groomed, well-developed  HEAD:  Atraumatic, Normocephalic  EYES: EOMI, PERRLA, conjunctiva and sclera clear  ENMT: No tonsillar erythema, exudates, or enlargement; Moist mucous membranes, Good dentition, No lesions  NECK: Supple,   CHEST/LUNG: decreased bs at bases; No rales, rhonchi, wheezing, or rubs  bilaterally  HEART: Regular rate and rhythm; No murmurs, rubs, or gallops  ABDOMEN: Soft, Nontender, Nondistended; Bowel sounds present  EXTREMITIES:  2+ Peripheral Pulses, No clubbing, cyanosis, or edema BL LE  SKIN: No rashes or lesions  NERVOUS SYSTEM:  Alert & Oriented X3, Good concentration; Motor Strength 5/5 B/L upper and lower extremities;   DTRs 2+ intact and symmetric, sensation intact BL    LABS:              Urinalysis Basic - ( 12 Oct 2023 06:58 )    Color: x / Appearance: x / SG: x / pH: x  Gluc: 99 mg/dL / Ketone: x  / Bili: x / Urobili: x   Blood: x / Protein: x / Nitrite: x   Leuk Esterase: x / RBC: x / WBC x   Sq Epi: x / Non Sq Epi: x / Bacteria: x      Cultures;   CAPILLARY BLOOD GLUCOSE        Lipid panel:           RADIOLOGY & ADDITIONAL TESTS:  < from: TTE Echo Complete w/o Contrast w/ Doppler (10.07.23 @ 10:57) >  Summary:   1. Left ventricular ejection fraction, by visual estimation, is 55 to   60%.   2. Technically adequate study.   3. Normal global left ventricular systolic function.   4. Mildly increased LV wall thickness.   5. Normal left ventricular internal cavity size.   6. Mildly reduced RV systolic function.   7. Moderately enlarged right ventricle.   8. Mildly enlarged right atrium.   9. Normal left atrial size.  10. There is no evidence of pericardial effusion.  11. Structurally normal mitral valve, with normal leaflet excursion.  12. Trace mitral valve regurgitation.  13. Moderate-severe tricuspid regurgitation.  14. Mild aortic valve stenosis.  15. Estimated pulmonary artery systolic pressure is 64.4 mmHg assuming a   right atrial pressure of 8 mmHg, which is consistent with severe   pulmonary hypertension.    < end of copied text >

## 2023-10-16 NOTE — PROGRESS NOTE ADULT - ASSESSMENT
Pt is a 52 yo BF with h/o poss RA presented with AHRF 2 to PNA. On day of admission pt deteriorated and had RRT; pt intubated and pt transferred to ICU. CT chest showed severe GGO/consolidations and bronchiectasis. Pt met criteria for ARDS; s/p proning. Currently oxygenation improved. s/p extubation 10/9    Resp:   acute resp failure- with hypoxia presumed secondary to pna-ards with possibly a autoimmune rheum component   s/p extubation   currently on 2 lpm  continue antibx   and steroids appreciate pulm and rheum consult   10/13/2023 appreciate rheum consult   check room air sat rest and ambulation  10/14/2023 still requires oxygen has no insurance was to be seen by financial dept as per  pt has medicaid pending   will also ask physical therapy to see pt again to establish if still acute rehab vs outpt rehab   slow prednisone taper over months ensure on PPI and calcium /vitamin d   10/16/2023 slow steroid taper , and need home oxygen    ID: Cx NTD; finish course of Zosyn for a total of 7 days/ ID consult pt with remote h/o TB Rx and pt may need to be started on chronic Steroids  10/14/2023 s/p antibx      [facility]: May need to start antiHTN med/ Check EKG pt tachycardic   10/13/2023 will start Cardizem   10/14/2023 hr better       Heme: DVT prophylaxis on lovenox

## 2023-10-16 NOTE — PROGRESS NOTE ADULT - SUBJECTIVE AND OBJECTIVE BOX
[facility]  INFECTIOUS DISEASES   [address] [place] [address]  Tel: [phone]     Fax: [phone]  ==============================================================================  MD Cristofer Simmons, DO Reina Marshall, NP   ==============================================================================      ADY BENDER  MRN-[id number]  53y ([birth year])      Interval History:    ROS:    [ ] Unobtainable because:  [ ] All other systems negative except as noted    Constitutional: no fever, no chills  Head: no trauma  Eyes: no vision changes, no eye pain  ENT:  no sore throat, no rhinorrhea  Cardiovascular:  no chest pain, no palpitation  Respiratory:  no SOB, no cough  GI:  no abd pain, no vomiting, no diarrhea  urinary: no dysuria, no hematuria, no flank pain  musculoskeletal:  no joint pain, no joint swelling  skin:  no rash  neurology:  no headache, no seizure, no change in mental status  psych: no anxiety, no depression         Allergies  No Known Drug Allergies  Tomatoes (Urticaria)  strawberries, tomatoes, chocolate; reaction of cough, itchy throat (Other)        ANTIMICROBIALS:        Physical Exam:  Vital Signs Last 24 Hrs  T(C): 37.3 (16 Oct 2023 04:45), Max: 37.3 (16 Oct 2023 04:45)  T(F): 99.1 (16 Oct 2023 04:45), Max: 99.1 (16 Oct 2023 04:45)  HR: 88 (16 Oct 2023 07:00) (88 - 127)  BP: 138/81 (16 Oct 2023 04:45) (118/84 - 158/92)  BP(mean): 111 (15 Oct 2023 16:38) (111 - 111)  RR: 18 (16 Oct 2023 04:45) (18 - 20)  SpO2: 93% (16 Oct 2023 04:45) (93% - 98%)    Parameters below as of 16 Oct 2023 04:45  Patient On (Oxygen Delivery Method): room air        General:    NAD,  non toxic  Head: atraumatic, normocephalic  Eye: normal sclera and conjunctiva  ENT:    no oral lesions, neck supple  Cardio:     regular S1, S2,  no murmur  Respiratory:    clear b/l,    no wheezing  abd:     soft,   BS +,   no tenderness  :   no CVAT,  no suprapubic tenderness,   no  warren  Musculoskeletal:   no joint swelling,   no edema  vascular: no central lines, +PIV   Skin:    no rash  Neurologic:     no focal deficit  psych: normal affect    WBC Count: 9.06 K/uL (10-12 @ 06:58)  WBC Count: 11.86 K/uL (10-11 @ 03:00)  WBC Count: 13.45 K/uL (10-10 @ 02:58)                        Creatinine Trend: 0.62<--, 0.61<--, 0.84<--, 0.65<--, 0.74<--, 0.73<--      MICROBIOLOGY:  v  .Sputum Sputum  10-13-23 --  --  --      .Sputum  10-07-23   No acid-fast bacilli isolated at 1 week. ****Acid-fast cultures are held  for 6 weeks.****  --  --      ET Tube ET Tube  10-07-23   No acid-fast bacilli isolated at 1 week. ****Acid-fast cultures are held  for 6 weeks.****  --  --      .Sputum Sputum  10-06-23   No growth  --    Few polymorphonuclear leukocytes per low power field  No Squamous epithelial cells per low power field  Rare Gram Negative Rods per oil power field      Clean Catch Clean Catch (Midstream)  10-06-23   <10,000 CFU/mL Normal Urogenital Lolis  --  --      .Blood Blood-Peripheral  10-06-23   No growth at 5 days  --  --                  C-Reactive Protein, Serum: 169 (10-07)        D-Dimer Assay, Quantitative: 282 (10-06)            RADIOLOGY:   [facility]  INFECTIOUS DISEASES   [address] [place] [address]  Tel: [phone]     Fax: [phone]  ==============================================================================  MD Cristofer Simmons, DO Reina Marshall, NP   ==============================================================================      ADY BENDER  MRN-[id number]  53y ([birth year])      Interval History:  patient seen and examined today.  she states she feels much better and denies any sob.  she states she has been breathing comfortably without any NC since yesterday.      ROS:      Constitutional: no fever, no chills  Head: no trauma  Eyes: no vision changes, no eye pain  ENT:  no sore throat, no rhinorrhea  Cardiovascular:  no chest pain, no palpitation  Respiratory:  no SOB, no cough  GI:  no abd pain, no vomiting, no diarrhea  urinary: no dysuria, no hematuria, no flank pain  musculoskeletal:  no joint pain, no joint swelling  skin:  no rash  neurology:  no headache, no seizure, no change in mental status  psych: no anxiety, no depression       Allergies  No Known Drug Allergies  Tomatoes (Urticaria)  strawberries, tomatoes, chocolate; reaction of cough, itchy throat (Other)      ANTIMICROBIALS:        Physical Exam:  Vital Signs Last 24 Hrs  T(C): 37.3 (16 Oct 2023 04:45), Max: 37.3 (16 Oct 2023 04:45)  T(F): 99.1 (16 Oct 2023 04:45), Max: 99.1 (16 Oct 2023 04:45)  HR: 88 (16 Oct 2023 07:00) (88 - 127)  BP: 138/81 (16 Oct 2023 04:45) (118/84 - 158/92)  BP(mean): 111 (15 Oct 2023 16:38) (111 - 111)  RR: 18 (16 Oct 2023 04:45) (18 - 20)  SpO2: 93% (16 Oct 2023 04:45) (93% - 98%)    Parameters below as of 16 Oct 2023 04:45  Patient On (Oxygen Delivery Method): room air        General:    NAD,  non toxic  Head: atraumatic, normocephalic  Eye: normal sclera and conjunctiva  ENT:    no oral lesions, neck supple  Cardio:     regular S1, S2,  no murmur  Respiratory:    much better breath sounds compared to previous exam, no wheezing  abd:     soft,   BS +,   no tenderness, ND  :   no CVAT,  no suprapubic tenderness,   no  warren  Musculoskeletal:   no joint swelling,   no edema  vascular: no central lines, +PIV   Skin:    no rash  Neurologic:     no focal deficit  psych: normal affect    WBC Count: 9.06 K/uL (10-12 @ 06:58)  WBC Count: 11.86 K/uL (10-11 @ 03:00)  WBC Count: 13.45 K/uL (10-10 @ 02:58)      Creatinine Trend: 0.62<--, 0.61<--, 0.84<--, 0.65<--, 0.74<--, 0.73<--      MICROBIOLOGY:  v  .Sputum Sputum  10-13-23 --  --  --      .Sputum  10-07-23   No acid-fast bacilli isolated at 1 week. ****Acid-fast cultures are held  for 6 weeks.****  --  --      ET Tube ET Tube  10-07-23   No acid-fast bacilli isolated at 1 week. ****Acid-fast cultures are held  for 6 weeks.****  --  --      .Sputum Sputum  10-06-23   No growth  --    Few polymorphonuclear leukocytes per low power field  No Squamous epithelial cells per low power field  Rare Gram Negative Rods per oil power field      Clean Catch Clean Catch (Midstream)  10-06-23   <10,000 CFU/mL Normal Urogenital Lolis  --  --      .Blood Blood-Peripheral  10-06-23   No growth at 5 days        C-Reactive Protein, Serum: 169 (10-07)      D-Dimer Assay, Quantitative: 282 (10-06)

## 2023-10-17 ENCOUNTER — APPOINTMENT (OUTPATIENT)
Dept: PULMONOLOGY | Facility: CLINIC | Age: 53
End: 2023-10-17

## 2023-10-17 ENCOUNTER — TRANSCRIPTION ENCOUNTER (OUTPATIENT)
Age: 53
End: 2023-10-17

## 2023-10-17 VITALS
RESPIRATION RATE: 17 BRPM | HEART RATE: 98 BPM | TEMPERATURE: 99 F | SYSTOLIC BLOOD PRESSURE: 113 MMHG | DIASTOLIC BLOOD PRESSURE: 75 MMHG | OXYGEN SATURATION: 97 %

## 2023-10-17 LAB
CARDIOLIPIN AB SER-ACNC: NEGATIVE — SIGNIFICANT CHANGE UP
CARDIOLIPIN AB SER-ACNC: NEGATIVE — SIGNIFICANT CHANGE UP

## 2023-10-17 PROCEDURE — 99239 HOSP IP/OBS DSCHRG MGMT >30: CPT

## 2023-10-17 PROCEDURE — 99233 SBSQ HOSP IP/OBS HIGH 50: CPT

## 2023-10-17 RX ORDER — DILTIAZEM HCL 120 MG
1 CAPSULE, EXT RELEASE 24 HR ORAL
Qty: 30 | Refills: 0
Start: 2023-10-17 | End: 2023-11-15

## 2023-10-17 RX ORDER — PANTOPRAZOLE SODIUM 20 MG/1
1 TABLET, DELAYED RELEASE ORAL
Qty: 30 | Refills: 0
Start: 2023-10-17 | End: 2023-11-15

## 2023-10-17 RX ORDER — ALBUTEROL 90 UG/1
2 AEROSOL, METERED ORAL
Qty: 1 | Refills: 0
Start: 2023-10-17 | End: 2023-11-15

## 2023-10-17 RX ADMIN — CHLORHEXIDINE GLUCONATE 1 APPLICATION(S): 213 SOLUTION TOPICAL at 05:24

## 2023-10-17 RX ADMIN — Medication 30 MILLIGRAM(S): at 05:20

## 2023-10-17 RX ADMIN — Medication 1 TABLET(S): at 17:05

## 2023-10-17 RX ADMIN — Medication 30 MILLIGRAM(S): at 13:13

## 2023-10-17 RX ADMIN — ENOXAPARIN SODIUM 40 MILLIGRAM(S): 100 INJECTION SUBCUTANEOUS at 11:22

## 2023-10-17 RX ADMIN — PANTOPRAZOLE SODIUM 40 MILLIGRAM(S): 20 TABLET, DELAYED RELEASE ORAL at 05:20

## 2023-10-17 RX ADMIN — Medication 50 MILLIGRAM(S): at 05:21

## 2023-10-17 RX ADMIN — Medication 1 TABLET(S): at 05:20

## 2023-10-17 RX ADMIN — Medication 30 MILLIGRAM(S): at 21:27

## 2023-10-17 NOTE — DISCHARGE NOTE PROVIDER - NSDCCPCAREPLAN_GEN_ALL_CORE_FT
PRINCIPAL DISCHARGE DIAGNOSIS  Diagnosis: Acute respiratory failure with hypoxia  Assessment and Plan of Treatment:       SECONDARY DISCHARGE DIAGNOSES  Diagnosis: Multifocal pneumonia  Assessment and Plan of Treatment:     Diagnosis: Fever  Assessment and Plan of Treatment:     Diagnosis: Elevated troponin  Assessment and Plan of Treatment:     Diagnosis: Hypoxia  Assessment and Plan of Treatment:     Diagnosis: ILD (interstitial lung disease)  Assessment and Plan of Treatment: f/u dr. grover    Diagnosis: Autoimmune thyroiditis  Assessment and Plan of Treatment: f/u dr. hunt

## 2023-10-17 NOTE — DISCHARGE NOTE PROVIDER - CARE PROVIDER_API CALL
Marck Fontenot  Endocrinology/Metab/Diabetes  [address] [facility] [facility] [address], [address]  [place] [address]  Phone: [phone]  Fax: [phone]  Follow Up Time:     Chyna Carmen  Pulmonary Disease  [address] [place] [address]  Phone: [phone]  Fax: [phone]  Scheduled Appointment: 10/19/2023 09:30 AM    follow up with your primary care doctor,   Phone: (   )    -  Fax: (   )    -  Follow Up Time:     Raymundo Pierce  Rheumatology  [address] [place] [address]  Phone: [phone]  Fax: [phone]  Follow Up Time:

## 2023-10-17 NOTE — PROGRESS NOTE ADULT - ASSESSMENT
Plan:  - patient extubated 10/9  - pt weaned from 3LNC to RA - now O2 saturations 97%   - with ambulation on room air the patients o2 saturation dropped to 85%  - pt rapidly improved with steroids, making it less likely there was infectious component as all cx were also negative.   - suggestive of ILD flare, possibly secondary to RA.   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  - QuantiFeron test- indeterminate  - she will need to have follow up chest CT as outpatient.  - monitor for symptoms such as a fever, night sweats - she should follow up with ID/ PULm   - c/w Prednisone slow taper -  50mg once a day X 2 weeks ( started on 10/12)   - did receive daxamethasone (10/6-10/9)  - rheum consulted,   - (+)LORA, though no other obvious signs/symptoms of another underlying connective tissue disorder.  Further w/u revealed (+)TPO AB, c/w autoimmune thyroiditis w/ low TSH.  - Consider endocrine eval for autoimmune thyroiditis.      Pulmonary Follow up :   Telehealth appointment with pulmonary office:  Patient has a follow up scheduled with Dr. Lisker at 10:15 am on 10/17/2023.  -  follow up within 2 weeks of discharge in with PFTs and repeat imaging. Please call [phone] or email  [email or web address] for an appointment at the Pulmonary office ([address] [place] [address], [address] [facility], [place], [place]).                    - Started on PCP PPX for bactrim   On discharge patient can follow up at [facility] Pulmonary and Sleep Medicine at [place]    Pulmonary/Sleep Clinic  [address] [place] [id number]  [phone] Plan:  - patient extubated 10/9  - pt weaned from 3LNC to RA - now O2 saturations 97%   - with ambulation on room air the patients o2 saturation dropped to 85%  - pt rapidly improved with steroids, making it less likely there was infectious component as all cx were also negative.   - suggestive of ILD flare, possibly secondary to RA.   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  - QuantiFeron test- indeterminate  - she will need to have follow up chest CT as outpatient.  - monitor for symptoms such as a fever, night sweats - she should follow up with ID/ PULm   - c/w Prednisone slow taper -  50mg once a day X 2 weeks ( started on 10/12) followed by 25mg for 2 weeks, at which point patient should have appointment with pulmonary   - did receive dexamethasone (10/6-10/9)  - Bactrim started for prophylaxis - can be stopped when steroids are stopped   - rheum consulted,   - (+)LORA, though no other obvious signs/symptoms of another underlying connective tissue disorder.  Further w/u revealed (+)TPO AB, c/w autoimmune thyroiditis w/ low TSH.  - Consider endocrine eval for autoimmune thyroiditis.      Pulmonary Follow up :   Telehealth appointment with pulmonary office:  Patient has a follow up scheduled with Dr. Chyna Downey  at 10:15 am on 10/19/2023.  -  follow up within 2 weeks of discharge in with PFTs and repeat imaging. Please call [phone] or email  [email or web address] for an appointment at the Pulmonary office ([address], [address], [place], [place]).                    - Started on PCP PPX for bactrim   On discharge patient can follow up at [facility] Pulmonary and Sleep Medicine at [place]    Pulmonary/Sleep Clinic  [address] [place] [id number]  [phone] No

## 2023-10-17 NOTE — DISCHARGE NOTE PROVIDER - NSDCFUSCHEDAPPT_GEN_ALL_CORE_FT
Chyna Carmen  [facility]  PULED [address] [place] [address]  Scheduled Appointment: 10/19/2023

## 2023-10-17 NOTE — CHART NOTE - NSCHARTNOTESELECT_GEN_ALL_CORE
Nutrition Services
Nutrition Services
Tele Follow up/Event Note
Event Note
Event Note
TB records/Event Note
Transfer Note
oxygen/Event Note

## 2023-10-17 NOTE — DISCHARGE NOTE PROVIDER - CARE PROVIDERS DIRECT ADDRESSES
,DirectAddress_Unknown,DirectAddress_Unknown,DirectAddress_Unknown,[email or web address]

## 2023-10-17 NOTE — PROGRESS NOTE ADULT - REASON FOR ADMISSION
hypoxic respiratory failure, multifocal pneumonia

## 2023-10-17 NOTE — DISCHARGE NOTE PROVIDER - PROVIDER TOKENS
PROVIDER:[TOKEN:[[id number]]],PROVIDER:[TOKEN:[[id number]],SCHEDULEDAPPT:[10/19/2023],SCHEDULEDAPPTTIME:[09:30 AM]],FREE:[LAST:[follow up with your primary care doctor],PHONE:[(   )    -],FAX:[(   )    -]],PROVIDER:[TOKEN:[[id number]]]

## 2023-10-17 NOTE — PROGRESS NOTE ADULT - ASSESSMENT
Pt is a 52 yo BF with h/o poss RA presented with AHRF 2 to PNA. On day of admission pt deteriorated and had RRT; pt intubated and pt transferred to ICU. CT chest showed severe GGO/consolidations and bronchiectasis. Pt met criteria for ARDS; s/p proning. Currently oxygenation improved. s/p extubation 10/9    Resp:   acute resp failure- with hypoxia presumed secondary to pna-ards with possibly a autoimmune rheum component   s/p extubation   currently on 2 lpm  continue antibx   and steroids appreciate pulm and rheum consult   10/13/2023 appreciate rheum consult   check room air sat rest and ambulation  10/14/2023 still requires oxygen has no insurance was to be seen by financial dept as per  pt has medicaid pending   will also ask physical therapy to see pt again to establish if still acute rehab vs outpt rehab   slow prednisone taper over months ensure on PPI and calcium /vitamin d   10/16/2023 slow steroid taper , and need home oxygen  10/17/2023 per my colleague's documentation ( pulmonary )  "----- Prednisone 50 for 2 weeks 25 for 2 weeks followed by prednisone 10mg daily until seen and reevaluated by pulmonary office.     -  follow up within 2 weeks of discharge in with PFTs and repeat imaging. Please call [phone] or email  [email or web address] for an appointment at the Pulmonary office ([address] [place] [address], [address], [place], [place]).        ID: Cx NTD; finish course of Zosyn for a total of 7 days/ ID consult pt with remote h/o TB Rx and pt may need to be started on chronic Steroids  10/14/2023 s/p antibx      [facility]: May need to start antiHTN med/ Check EKG pt tachycardic   10/13/2023 will start Cardizem   10/14/2023 hr better       Heme: DVT prophylaxis on lovenox

## 2023-10-17 NOTE — PROGRESS NOTE ADULT - NS ATTEND AMEND GEN_ALL_CORE FT
54 yo female with RA, class 4 TB s/p treatement, ILD admitted for acute hypoxic respiratory failure with concern for PNA vs AIP vs ILD flare vs Hypersensitivity pneumonitis      Plan:  - patient extubated 10/9  - pt weaned from 3LNC to RA satting 96% at rest  - with ambulation on room air the patients o2 saturation dropped to 85%  - pt rapidly improved with steroids, making it less likely there was infectious component as all cx were also negative. 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  - QuantiFeron test- indeterminate  - c/w Prednisone 50mg once a day   - Prednisone taper: Prednisone 50mg once a day for 2 weeks. Decrease to prednisone 25mg once a day for weeks   - rheum consulted, will continue on steroids for RA and initiate DMARDS with outpt follow up   - c/w PCP PPX for bactrim while prednisone dose is >20mg   - Patient to follow up Dr. Chyna Carmen on 10/19 at 930am on tele-visit   Telehealth Instructions: At the time of your appointment, you will receive a text/email invite for your telehealth session. Please click on the link, enter the patient's name. You will then be redirected to a virtual waiting room, where you will wait until the doctor has connected with you.

## 2023-10-17 NOTE — DISCHARGE NOTE PROVIDER - NSDCMRMEDTOKEN_GEN_ALL_CORE_FT
Albuterol (Eqv-ProAir HFA) 90 mcg/inh inhalation aerosol: 2 puff(s) inhaled every 6 hours as needed for  shortness of breath and/or wheezing  calcium-vitamin D 500 mg-5 mcg (200 intl units) oral tablet: 1 tab(s) orally 2 times a day  Cardizem  mg/24 hours oral capsule, extended release: 1 cap(s) orally once a day  pantoprazole 40 mg oral delayed release tablet: 1 tab(s) orally once a day (before a meal)  predniSONE 5 mg oral tablet: 5 tab(s) orally once a day start taking this on 10/27/2023  predniSONE 50 mg oral tablet: 1 tab(s) orally once a day  sulfamethoxazole-trimethoprim 800 mg-160 mg oral tablet: 1 tab(s) orally Monday, Wednesday, and Friday

## 2023-10-17 NOTE — DISCHARGE NOTE PROVIDER - NSDCFUADDINST_GEN_ALL_CORE_FT
use oxygen via nasal cannula at 2 liters per minute especially with ambulation or going outside and/or whenever short of breath

## 2023-10-17 NOTE — DISCHARGE NOTE PROVIDER - HOSPITAL COURSE
· Assessment	  Pt is a 52 yo BF with h/o poss RA presented with AHRF 2 to PNA. On day of admission pt deteriorated and had RRT; pt intubated and pt transferred to ICU. CT chest showed severe GGO/consolidations and bronchiectasis. Pt met criteria for ARDS; s/p proning. Currently oxygenation improved. s/p extubation 10/9    Resp:   acute resp failure- with hypoxia presumed secondary to pna-ards with possibly a autoimmune rheum component   s/p extubation   currently on 2 lpm  continue antibx   and steroids appreciate pulm and rheum consult   10/13/2023 appreciate rheum consult   check room air sat rest and ambulation  10/14/2023 still requires oxygen has no insurance was to be seen by financial dept as per SW pt has medicaid pending   will also ask physical therapy to see pt again to establish if still acute rehab vs outpt rehab   slow prednisone taper over months ensure on PPI and calcium /vitamin d   10/16/2023 slow steroid taper , and need home oxygen  10/17/2023 per my colleague's documentation ( pulmonary )  "----- Prednisone 50 for 2 weeks 25 for 2 weeks followed by prednisone 10mg daily until seen and reevaluated by pulmonary office.     -  follow up within 2 weeks of discharge in with PFTs and repeat imaging. F/u dr. ramya becerra 10/19/2023 930am  [phone] or email  [email or web address] for an appointment at the Pulmonary office ([address], [address], [place], [place]).        ID: Cx NTD; finish course of Zosyn for a total of 7 days/ ID consult pt with remote h/o TB Rx and pt may need to be started on chronic Steroids  10/14/2023 s/p antibx      [facility]: May need to start antiHTN med/ Check EKG pt tachycardic   10/13/2023 will start Cardizem   10/14/2023 hr better

## 2023-10-17 NOTE — CHART NOTE - NSCHARTNOTEFT_GEN_A_CORE
Assessment:  Pt adm c diagnosis of SOB, fever, hypoxia, c acute respiratory failure presumed secondary to pneumonia , ARDS, s/p intubation, extubation, possible autoimmune rheum component, tachycardia, elevated BP since adm noted, possible need for HTN meds noted.  D/C plans noted.  Pt educated on soft/bite sized nutrition therapy, follow up c MD regarding consistency of diet, encouraged low sodium nutrition therapy.      Factors impacting intake: [ ] none [ ] nausea  [ ] vomiting [ ] diarrhea [ ] constipation  [ ]chewing problems [ x] swallowing issues; 10/10, swallow evaluation c recommendation for soft and bite sized consistency c thin fluids [ ] other:     Diet Prescription: Diet, Regular:   Soft and Bite Sized (SOFTBTSZ) (10-10-23 @ 13:19)  Food Allergies: strawberries, tomatoes, chocolate noted     Intake: >75%, without report of altered chew/swallow, no c/o food intolerances/allergy symptoms     Current Weight: 10/17, 51.2 kg, 10/09, 49.4 kg, c wt. gain of 1.8 kg  % Weight Change: 3.6%  10/11, generalized non-pitting edema noted    Nutrition focused physical exam conducted; Subcutaneous fat Exam;  [ Mild  ]  Orbital fat pads region,  [ WNL  ]Buccal fat region,  [ WNL  ]triceps region, [ - ]ribs region.  Muscle Exam; [ WNL  ]temples region, [  Mild ]clavicle region, [ WNL   ]shoulder region, [ - ]Scapula region, [  WNL ]Interosseous region, [ WNL  ]thigh region, [ Mild  ]Calf region      Pertinent Medications: MEDICATIONS  (STANDING):  calcium carbonate 1250 mG  + Vitamin D (OsCal 500 + D) 1 Tablet(s) Oral two times a day  chlorhexidine 2% Cloths 1 Application(s) Topical <User Schedule>  diltiazem    Tablet 30 milliGRAM(s) Oral every 8 hours  enoxaparin Injectable 40 milliGRAM(s) SubCutaneous every 24 hours  pantoprazole    Tablet 40 milliGRAM(s) Oral before breakfast  predniSONE   Tablet 50 milliGRAM(s) Oral daily  trimethoprim  160 mG/sulfamethoxazole 800 mG 1 Tablet(s) Oral <User Schedule>    MEDICATIONS  (PRN):  acetaminophen     Tablet .. 650 milliGRAM(s) Oral every 6 hours PRN Temp greater or equal to 38C (100.4F), Mild Pain (1 - 3), Moderate Pain (4 - 6)  albuterol/ipratropium for Nebulization 3 milliLiter(s) Nebulizer every 6 hours PRN Shortness of Breath and/or Wheezing  ondansetron Injectable 4 milliGRAM(s) IV Push every 8 hours PRN Nausea and/or Vomiting    Pertinent Labs:  10-12 Phos 2.6 mg/dL 10-12 Alb 2.6 g/dL<L> 10-07 Chol 175 mg/dL LDL --    HDL 94 mg/dL Trig 67 mg/dL10-12 ALT 44 U/L AST 36 U/L Alkaline Phosphatase 63 U/L  10-07-23 @ 04:00 a1c 5.2   10-07 Chol 175 LDL -- HDL 94 Trig 67 Thyroid stimulating Hormone 0.176 uU/ml <L>   CAPILLARY BLOOD GLUCOSE        Skin: 10/16, WDL except ecchymotic     Estimated Needs:   [ x] no change since previous assessment(10/09)  [ ] recalculated:     Previous Nutrition Diagnosis: (10/09)  Inadequate Energy Intake:   Etiology	acute illness, s/p intubation/extubation  Signs/Symptoms: <50% nutrition needs x 3 days  Goal/Expected Outcome: pt to consume >75% of meals; met   Nutrition Diagnosis is [ ] ongoing  [ x] resolved [ ] not applicable       New Nutrition Diagnosis: [x ] not applicable     Interventions:   Recommend  [x ] Change Diet To: low sodium, soft & bite sized   [ ] Nutrition Supplement  [ ] Nutrition Support  [ ] Other:     Monitoring and Evaluation:   [x ] PO intake [ x ] Tolerance to diet prescription [ x ] weights [ x ] labs[ x ] follow up per protocol  [ ] other:

## 2023-10-17 NOTE — PROGRESS NOTE ADULT - SUBJECTIVE AND OBJECTIVE BOX
CHIEF COMPLAINT:  ===============      INTERVAL EVENTS:  ==================    - T(F): , Max: 99 (10-17-23 @ 04:42)  RR: 18 (10-17-23 @ 04:42)  SpO2: 97% (10-17-23 @ 04:42)        REVIEW OF SYSTEMS:  ==================  - no fever, no chills  - no HA, no dizziness  - no visual changes, no auditory changes  - no sore throat, no sinus congestion  - no SOB, no cough  - no chest pain, no palpitations  - no abdominal pain, no N/V/D  - no dysuria, no hematuria  - no myalgias, no arthralgias  - no pain in extremity, no swelling   - no rashes, no pruritis  - no neuro deficits  - no psychiatric concerns       HPI:  53 years old female with no significant PMH present to ED with complain of worsening SOB for 1 day. Patietn reported cough for 3-4 days, fever for 1 day and worsening SOB for 1 day. No nausea, vomiting or diarrhea. No known sick exposure.  Hypoxic to 69 % at RA, require nonrebreather, tachycardic. WBC 16.81, plt 218, ddimer 282, K 3.4, Cr 0.85, lactate 3, hsTnT 107.4, proBNP 344. CTA chest with no PE. Bilateral lung opacities. Bronchiectasis.     Extubated 10/9  Quantiferon indeterminate     OBJECTIVE:  ===========    ICU Vital Signs Last 24 Hrs  T(C): 37.2 (17 Oct 2023 04:42), Max: 37.2 (17 Oct 2023 04:42)  T(F): 99 (17 Oct 2023 04:42), Max: 99 (17 Oct 2023 04:42)  HR: 112 (17 Oct 2023 04:42) (83 - 128)  BP: 154/90 (17 Oct 2023 04:42) (124/82 - 158/97)  RR: 18 (17 Oct 2023 04:42) (16 - 18)  SpO2: 97% (17 Oct 2023 04:42) (91% - 97%)    O2 Parameters below as of 17 Oct 2023 04:42  Patient On (Oxygen Delivery Method): room air      10-16 @ 07:01  -  10-17 @ 07:00  --------------------------------------------------------  IN: 1200 mL / OUT: 0 mL / NET: 1200 mL      CAPILLARY BLOOD GLUCOSE      PHYSICAL EXAM:  ==============  General:   HEENT:   Respiratory:   Cardiovascular:   Abdomen:   Extremities:   Skin:   Neurological:  Psychiatry:    HOSPITAL MEDICATIONS:  ======================  enoxaparin Injecable 40 milliGRAM(s) SubCutaneous every 24 hours  trimethoprim  160 mG/sulfamethoxazole 800 mG 1 Tablet(s) Oral <User Schedule>  diltiazem    Tablet 30 milliGRAM(s) Oral every 8 hours  predniSONE   Tablet 50 milliGRAM(s) Oral daily  albuterol/ipratropium for Nebulization 3 milliLiter(s) Nebulizer every 6 hours PRN  acetaminophen     Tablet .. 650 milliGRAM(s) Oral every 6 hours PRN  ondansetron Injectable 4 milliGRAM(s) IV Push every 8 hours PRN  pantoprazole    Tablet 40 milliGRAM(s) Oral before breakfast  calcium carbonate 1250 mG  + Vitamin D (OsCal 500 + D) 1 Tablet(s) Oral two times a day  chlorhexidine 2% Cloths 1 Application(s) Topical <User Schedule>        LABS:  =====    Hgb Trend: 10.5<--, 11.4<--    Creatinine Trend: 0.62<--, 0.61<--, 0.84<--, 0.65<--, 0.74<--, 0.73<--    Lactate, Blood: 3.0  Lactate, Blood: 5.9  Lactate, Blood: 6.8  Lactate, Blood: 1.3  Lactate, Blood: 1.0    MICROBIOLOGY:     Culture - Sputum (collected 10-06-23 @ 21:00)  Source: .Sputum Sputum  Gram Stain (10-07-23 @ 06:47):    Few polymorphonuclear leukocytes per low power field    No Squamous epithelial cells per low power field    Rare Gram Negative Rods per oil power field  Final Report (10-08-23 @ 16:34):    No growth    Culture - Urine (collected 10-06-23 @ 10:25)  Source: Clean Catch Clean Catch (Midstream)  Final Report (10-07-23 @ 13:38):    <10,000 CFU/mL Normal Urogenital Lolis    Culture - Blood (collected 10-06-23 @ 05:30)  Source: .Blood Blood-Peripheral  Final Report (10-11-23 @ 09:01):    No growth at 5 days    Culture - Blood (collected 10-06-23 @ 05:30)  Source: .Blood Blood-Peripheral  Final Report (10-11-23 @ 09:01):    No growth at 5 days    azithromycin  IVPB 500 every 24 hours  azithromycin  IVPB 500 once  azithromycin  IVPB 500 every 24 hours  cefTRIAXone   IVPB 1000 once  cefTRIAXone   IVPB 1000 every 24 hours  levoFLOXacin IVPB 750 every 24 hours  piperacillin/tazobactam IVPB. 3.375 once  piperacillin/tazobactam IVPB.- 3.375 once  piperacillin/tazobactam IVPB.. 3.375 every 8 hours  trimethoprim  160 mG/sulfamethoxazole 800 mG 1 <User Schedule>  trimethoprim  160 mG/sulfamethoxazole 800 mG 1 once      RADIOLOGY:  ==========  < from: CT Chest No Cont (10.12.23 @ 18:23) >  IMPRESSION:  Interval evolution and decrease of diffuse groundglass opacities in   bilateral lungs which is more consolidative in the left upper lobe and is   associated traction bronchiectasis. The finding is in keeping with   organization from prior infection process.      PULMONARY:  ============  albuterol/ipratropium for Nebulization 3 Nebulizer every 6 hours PRN    CARDIAC:  ========  < from: TTE Echo Complete w/o Contrast w/ Doppler (10.07.23 @ 10:57) >  Summary:   1. Left ventricular ejection fraction, by visual estimation, is 55 to   60%.   2. Technically adequate study.   3. Normal global left ventricular systolic function.   4. Mildly increased LV wall thickness.   5. Normal left ventricular internal cavity size.   6. Mildly reduced RV systolic function.   7. Moderately enlarged right ventricle.   8. Mildly enlarged right atrium.   9. Normal left atrial size.  10. There is no evidence of pericardial effusion.  11. Structurally normal mitral valve, with normal leaflet excursion.  12. Trace mitral valve regurgitation.  13. Moderate-severe tricuspid regurgitation.  14. Mild aortic valve stenosis.  15. Estimated pulmonary artery systolic pressure is 64.4 mmHg assuming a   right atrial pressure of 8 mmHg, which is consistent with severe   pulmonary hypertension.  16. Increased relative wall thickness with normal mass index consistent   with left ventricular concentric remodeling.    < end of copied text >   CHIEF COMPLAINT:  ===============  Respiratory failure - requiring intubation     INTERVAL EVENTS:  ==================    - now on room air,  -pending discharge     - T(F): , Max: 99 (10-17-23 @ 04:42)  RR: 18 (10-17-23 @ 04:42)  SpO2: 97% (10-17-23 @ 04:42)        REVIEW OF SYSTEMS:  ==================  - no fever, no chills  - no HA, no dizziness  - no visual changes, no auditory changes  - no sore throat, no sinus congestion  - no SOB, no cough  - no chest pain, no palpitations  - no abdominal pain, no N/V/D  - no dysuria, no hematuria  - no myalgias, no arthralgias  - no pain in extremity, no swelling   - no rashes, no pruritis  - no neuro deficits  - no psychiatric concerns       HPI:  53 years old female with no significant PMH present to ED with complain of worsening SOB for 1 day. Patietn reported cough for 3-4 days, fever for 1 day and worsening SOB for 1 day. No nausea, vomiting or diarrhea. No known sick exposure.  Hypoxic to 69 % at RA, require nonrebreather, tachycardic. WBC 16.81, plt 218, ddimer 282, K 3.4, Cr 0.85, lactate 3, hsTnT 107.4, proBNP 344. CTA chest with no PE. Bilateral lung opacities. Bronchiectasis.     Extubated 10/9  Quantiferon indeterminate     OBJECTIVE:  ===========    ICU Vital Signs Last 24 Hrs  T(C): 37.2 (17 Oct 2023 04:42), Max: 37.2 (17 Oct 2023 04:42)  T(F): 99 (17 Oct 2023 04:42), Max: 99 (17 Oct 2023 04:42)  HR: 112 (17 Oct 2023 04:42) (83 - 128)  BP: 154/90 (17 Oct 2023 04:42) (124/82 - 158/97)  RR: 18 (17 Oct 2023 04:42) (16 - 18)  SpO2: 97% (17 Oct 2023 04:42) (91% - 97%)    O2 Parameters below as of 17 Oct 2023 04:42  Patient On (Oxygen Delivery Method): room air      10-16 @ 07:01  -  10-17 @ 07:00  --------------------------------------------------------  IN: 1200 mL / OUT: 0 mL / NET: 1200 mL      CAPILLARY BLOOD GLUCOSE      PHYSICAL EXAM:  ==============  General: awake , alert   HEENT: EOMI   Respiratory: CTA B/L   Cardiovascular: S1S2 RRR  Abdomen: soft + BS   Extremities: NO edema   Skin: intact   Neurological: no deficit    HOSPITAL MEDICATIONS:  ======================  enoxaparin Injecable 40 milliGRAM(s) SubCutaneous every 24 hours  trimethoprim  160 mG/sulfamethoxazole 800 mG 1 Tablet(s) Oral <User Schedule>  diltiazem    Tablet 30 milliGRAM(s) Oral every 8 hours  predniSONE   Tablet 50 milliGRAM(s) Oral daily  albuterol/ipratropium for Nebulization 3 milliLiter(s) Nebulizer every 6 hours PRN  acetaminophen     Tablet .. 650 milliGRAM(s) Oral every 6 hours PRN  ondansetron Injectable 4 milliGRAM(s) IV Push every 8 hours PRN  pantoprazole    Tablet 40 milliGRAM(s) Oral before breakfast  calcium carbonate 1250 mG  + Vitamin D (OsCal 500 + D) 1 Tablet(s) Oral two times a day  chlorhexidine 2% Cloths 1 Application(s) Topical <User Schedule>        LABS:  =====    Hgb Trend: 10.5<--, 11.4<--    Creatinine Trend: 0.62<--, 0.61<--, 0.84<--, 0.65<--, 0.74<--, 0.73<--    Lactate, Blood: 3.0  Lactate, Blood: 5.9  Lactate, Blood: 6.8  Lactate, Blood: 1.3  Lactate, Blood: 1.0    MICROBIOLOGY:     Culture - Sputum (collected 10-06-23 @ 21:00)  Source: .Sputum Sputum  Gram Stain (10-07-23 @ 06:47):    Few polymorphonuclear leukocytes per low power field    No Squamous epithelial cells per low power field    Rare Gram Negative Rods per oil power field  Final Report (10-08-23 @ 16:34):    No growth    Culture - Urine (collected 10-06-23 @ 10:25)  Source: Clean Catch Clean Catch (Midstream)  Final Report (10-07-23 @ 13:38):    <10,000 CFU/mL Normal Urogenital Lolis    Culture - Blood (collected 10-06-23 @ 05:30)  Source: .Blood Blood-Peripheral  Final Report (10-11-23 @ 09:01):    No growth at 5 days    Culture - Blood (collected 10-06-23 @ 05:30)  Source: .Blood Blood-Peripheral  Final Report (10-11-23 @ 09:01):    No growth at 5 days    azithromycin  IVPB 500 every 24 hours  cefTRIAXone   IVPB 1000 every 24 hours  levoFLOXacin IVPB 750 every 24 hours  piperacillin/tazobactam IVPB.. 3.375 every 8 hours  trimethoprim  160 mG/sulfamethoxazole 800 mG 1 <User Schedule>  trimethoprim  160 mG/sulfamethoxazole 800 mG 1 once      RADIOLOGY:  ==========  < from: CT Chest No Cont (10.12.23 @ 18:23) >  IMPRESSION:  Interval evolution and decrease of diffuse groundglass opacities in   bilateral lungs which is more consolidative in the left upper lobe and is   associated traction bronchiectasis. The finding is in keeping with   organization from prior infection process.      PULMONARY:  ============  albuterol/ipratropium for Nebulization 3 Nebulizer every 6 hours PRN    CARDIAC:  ========  < from: TTE Echo Complete w/o Contrast w/ Doppler (10.07.23 @ 10:57) >  Summary:   1. Left ventricular ejection fraction, by visual estimation, is 55 to   60%.   2. Technically adequate study.   3. Normal global left ventricular systolic function.   4. Mildly increased LV wall thickness.   5. Normal left ventricular internal cavity size.   6. Mildly reduced RV systolic function.   7. Moderately enlarged right ventricle.   8. Mildly enlarged right atrium.   9. Normal left atrial size.  10. There is no evidence of pericardial effusion.  11. Structurally normal mitral valve, with normal leaflet excursion.  12. Trace mitral valve regurgitation.  13. Moderate-severe tricuspid regurgitation.  14. Mild aortic valve stenosis.  15. Estimated pulmonary artery systolic pressure is 64.4 mmHg assuming a   right atrial pressure of 8 mmHg, which is consistent with severe   pulmonary hypertension.  16. Increased relative wall thickness with normal mass index consistent   with left ventricular concentric remodeling.    < end of copied text >

## 2023-10-17 NOTE — PROGRESS NOTE ADULT - SUBJECTIVE AND OBJECTIVE BOX
Patient is a 53y old  Female who presents with a chief complaint of hypoxic respiratory failure, multifocal pneumonia (12 Oct 2023 12:07)      OVERNIGHT EVENTS:    MEDICATIONS  (STANDING):  calcium carbonate 1250 mG  + Vitamin D (OsCal 500 + D) 1 Tablet(s) Oral two times a day  chlorhexidine 2% Cloths 1 Application(s) Topical <User Schedule>  diltiazem    Tablet 30 milliGRAM(s) Oral every 8 hours  enoxaparin Injectable 40 milliGRAM(s) SubCutaneous every 24 hours  pantoprazole    Tablet 40 milliGRAM(s) Oral before breakfast  predniSONE   Tablet 50 milliGRAM(s) Oral daily  trimethoprim  160 mG/sulfamethoxazole 800 mG 1 Tablet(s) Oral <User Schedule>    MEDICATIONS  (PRN):  acetaminophen     Tablet .. 650 milliGRAM(s) Oral every 6 hours PRN Temp greater or equal to 38C (100.4F), Mild Pain (1 - 3), Moderate Pain (4 - 6)  albuterol/ipratropium for Nebulization 3 milliLiter(s) Nebulizer every 6 hours PRN Shortness of Breath and/or Wheezing  ondansetron Injectable 4 milliGRAM(s) IV Push every 8 hours PRN Nausea and/or Vomiting      Allergies    No Known Drug Allergies  Tomatoes (Urticaria)  strawberries, tomatoes, chocolate; reaction of cough, itchy throat (Other)    Intolerances        SUBJECTIVE: in bed in NAD, no acute events overnight     Vital Signs Last 24 Hrs  T(C): 37.2 (17 Oct 2023 04:42), Max: 37.2 (17 Oct 2023 04:42)  T(F): 99 (17 Oct 2023 04:42), Max: 99 (17 Oct 2023 04:42)  HR: 90 (17 Oct 2023 07:01) (83 - 128)  BP: 154/90 (17 Oct 2023 04:42) (124/82 - 158/97)  BP(mean): --  RR: 18 (17 Oct 2023 04:42) (16 - 18)  SpO2: 97% (17 Oct 2023 04:42) (91% - 97%)    Parameters below as of 17 Oct 2023 04:42  Patient On (Oxygen Delivery Method): room air      PHYSICAL EXAM:  GENERAL: NAD, well-groomed, well-developed  HEAD:  Atraumatic, Normocephalic  EYES: EOMI, PERRLA, conjunctiva and sclera clear  ENMT: No tonsillar erythema, exudates, or enlargement; Moist mucous membranes, Good dentition, No lesions  NECK: Supple,   CHEST/LUNG: decreased bs at bases; No rales, rhonchi, wheezing, or rubs  bilaterally  HEART: Regular rate and rhythm; No murmurs, rubs, or gallops  ABDOMEN: Soft, Nontender, Nondistended; Bowel sounds present  EXTREMITIES:  2+ Peripheral Pulses, No clubbing, cyanosis, or edema BL LE  SKIN: No rashes or lesions  NERVOUS SYSTEM:  Alert & Oriented X3, Good concentration; Motor Strength 5/5 B/L upper and lower extremities;   DTRs 2+ intact and symmetric, sensation intact BL    LABS:              Urinalysis Basic - ( 12 Oct 2023 06:58 )    Color: x / Appearance: x / SG: x / pH: x  Gluc: 99 mg/dL / Ketone: x  / Bili: x / Urobili: x   Blood: x / Protein: x / Nitrite: x   Leuk Esterase: x / RBC: x / WBC x   Sq Epi: x / Non Sq Epi: x / Bacteria: x      Cultures;   CAPILLARY BLOOD GLUCOSE        Lipid panel:           RADIOLOGY & ADDITIONAL TESTS:  < from: TTE Echo Complete w/o Contrast w/ Doppler (10.07.23 @ 10:57) >  Summary:   1. Left ventricular ejection fraction, by visual estimation, is 55 to   60%.   2. Technically adequate study.   3. Normal global left ventricular systolic function.   4. Mildly increased LV wall thickness.   5. Normal left ventricular internal cavity size.   6. Mildly reduced RV systolic function.   7. Moderately enlarged right ventricle.   8. Mildly enlarged right atrium.   9. Normal left atrial size.  10. There is no evidence of pericardial effusion.  11. Structurally normal mitral valve, with normal leaflet excursion.  12. Trace mitral valve regurgitation.  13. Moderate-severe tricuspid regurgitation.  14. Mild aortic valve stenosis.  15. Estimated pulmonary artery systolic pressure is 64.4 mmHg assuming a   right atrial pressure of 8 mmHg, which is consistent with severe   pulmonary hypertension.    < end of copied text >

## 2023-10-18 LAB
THYROGLOB SERPL-MCNC: 68.3 NG/ML — HIGH
THYROGLOB SERPL-MCNC: 68.3 NG/ML — HIGH
THYROGLOB SERPL-MCNC: <1 IU/ML — SIGNIFICANT CHANGE UP
THYROGLOB SERPL-MCNC: <1 IU/ML — SIGNIFICANT CHANGE UP
THYROGLOB SERPL-MCNC: SIGNIFICANT CHANGE UP NG/ML
THYROGLOB SERPL-MCNC: SIGNIFICANT CHANGE UP NG/ML

## 2023-10-19 ENCOUNTER — APPOINTMENT (OUTPATIENT)
Dept: PULMONOLOGY | Facility: CLINIC | Age: 53
End: 2023-10-19
Payer: MEDICAID

## 2023-10-19 ENCOUNTER — LABORATORY RESULT (OUTPATIENT)
Age: 53
End: 2023-10-19

## 2023-10-19 VITALS
SYSTOLIC BLOOD PRESSURE: 133 MMHG | TEMPERATURE: 97 F | HEART RATE: 117 BPM | OXYGEN SATURATION: 93 % | BODY MASS INDEX: 18.95 KG/M2 | HEIGHT: 62 IN | RESPIRATION RATE: 15 BRPM | WEIGHT: 103 LBS | DIASTOLIC BLOOD PRESSURE: 84 MMHG

## 2023-10-19 PROCEDURE — 99496 TRANSJ CARE MGMT HIGH F2F 7D: CPT

## 2023-10-23 LAB
ANA PAT FLD IF-IMP: ABNORMAL
ANA SER IF-ACNC: ABNORMAL
CRP SERPL-MCNC: 38 MG/L
DEPRECATED KAPPA LC FREE/LAMBDA SER: 1.22 RATIO
IGA SER QL IEP: 296 MG/DL
IGG SER QL IEP: 965 MG/DL
IGM SER QL IEP: 128 MG/DL
KAPPA LC CSF-MCNC: 0.92 MG/DL
KAPPA LC SERPL-MCNC: 1.12 MG/DL

## 2023-10-24 ENCOUNTER — APPOINTMENT (OUTPATIENT)
Dept: PULMONOLOGY | Facility: CLINIC | Age: 53
End: 2023-10-24

## 2023-10-31 DIAGNOSIS — J18.9 PNEUMONIA, UNSPECIFIED ORGANISM: ICD-10-CM

## 2023-10-31 DIAGNOSIS — R65.21 SEVERE SEPSIS WITH SEPTIC SHOCK: ICD-10-CM

## 2023-10-31 DIAGNOSIS — Z86.11 PERSONAL HISTORY OF TUBERCULOSIS: ICD-10-CM

## 2023-10-31 DIAGNOSIS — E06.3 AUTOIMMUNE THYROIDITIS: ICD-10-CM

## 2023-10-31 DIAGNOSIS — I07.1 RHEUMATIC TRICUSPID INSUFFICIENCY: ICD-10-CM

## 2023-10-31 DIAGNOSIS — J96.01 ACUTE RESPIRATORY FAILURE WITH HYPOXIA: ICD-10-CM

## 2023-10-31 DIAGNOSIS — J84.10 PULMONARY FIBROSIS, UNSPECIFIED: ICD-10-CM

## 2023-10-31 DIAGNOSIS — M06.841 OTHER SPECIFIED RHEUMATOID ARTHRITIS, RIGHT HAND: ICD-10-CM

## 2023-10-31 DIAGNOSIS — E87.6 HYPOKALEMIA: ICD-10-CM

## 2023-10-31 DIAGNOSIS — Z91.018 ALLERGY TO OTHER FOODS: ICD-10-CM

## 2023-10-31 DIAGNOSIS — J47.9 BRONCHIECTASIS, UNCOMPLICATED: ICD-10-CM

## 2023-10-31 DIAGNOSIS — I27.20 PULMONARY HYPERTENSION, UNSPECIFIED: ICD-10-CM

## 2023-10-31 DIAGNOSIS — M06.842 OTHER SPECIFIED RHEUMATOID ARTHRITIS, LEFT HAND: ICD-10-CM

## 2023-10-31 DIAGNOSIS — A41.9 SEPSIS, UNSPECIFIED ORGANISM: ICD-10-CM

## 2023-10-31 DIAGNOSIS — J80 ACUTE RESPIRATORY DISTRESS SYNDROME: ICD-10-CM

## 2023-11-06 LAB
ANTI PM-SCL-100 PLUS: <20 UNITS — SIGNIFICANT CHANGE UP
ANTI PM-SCL-100 PLUS: <20 UNITS — SIGNIFICANT CHANGE UP
ANTI-SAE 1 IGG: <20 UNITS — SIGNIFICANT CHANGE UP
ANTI-SAE 1 IGG: <20 UNITS — SIGNIFICANT CHANGE UP
ANTI-SS-A 52 KD AB, IGG PLUS: 42 UNITS — HIGH
ANTI-SS-A 52 KD AB, IGG PLUS: 42 UNITS — HIGH
ANTI-U1-RNP AB PLUS: <20 UNITS — SIGNIFICANT CHANGE UP
ANTI-U1-RNP AB PLUS: <20 UNITS — SIGNIFICANT CHANGE UP
EJ MYOMARKER3 PLUS: NEGATIVE — SIGNIFICANT CHANGE UP
EJ MYOMARKER3 PLUS: NEGATIVE — SIGNIFICANT CHANGE UP
ENA JO1 AB SER IA-ACNC: <20 UNITS — SIGNIFICANT CHANGE UP
ENA JO1 AB SER IA-ACNC: <20 UNITS — SIGNIFICANT CHANGE UP
FIBRILLARIN (U3 RNP) PLUS: NEGATIVE — SIGNIFICANT CHANGE UP
FIBRILLARIN (U3 RNP) PLUS: NEGATIVE — SIGNIFICANT CHANGE UP
KU MYOMARKER3 PLUS: ABNORMAL
KU MYOMARKER3 PLUS: ABNORMAL
MDA5 (P140)(CADM-140) PLUS: <20 UNITS — SIGNIFICANT CHANGE UP
MDA5 (P140)(CADM-140) PLUS: <20 UNITS — SIGNIFICANT CHANGE UP
MI-2 PLUS: ABNORMAL
MI-2 PLUS: ABNORMAL
NXP-2 (P140) MYOPLUS: 27 UNITS — HIGH
NXP-2 (P140) MYOPLUS: 27 UNITS — HIGH
OJ MYOMARKER3 PLUS: NEGATIVE — SIGNIFICANT CHANGE UP
OJ MYOMARKER3 PLUS: NEGATIVE — SIGNIFICANT CHANGE UP
PL-12 PLUS: NEGATIVE — SIGNIFICANT CHANGE UP
PL-12 PLUS: NEGATIVE — SIGNIFICANT CHANGE UP
PL-7 PLUS: ABNORMAL
PL-7 PLUS: ABNORMAL
SRP MYOMARKER3 PLUS: NEGATIVE — SIGNIFICANT CHANGE UP
SRP MYOMARKER3 PLUS: NEGATIVE — SIGNIFICANT CHANGE UP
TIF GAMMA (P155/140) PLUS: <20 UNITS — SIGNIFICANT CHANGE UP
TIF GAMMA (P155/140) PLUS: <20 UNITS — SIGNIFICANT CHANGE UP
U2 SNRNP PLUS: NEGATIVE — SIGNIFICANT CHANGE UP
U2 SNRNP PLUS: NEGATIVE — SIGNIFICANT CHANGE UP

## 2023-11-09 ENCOUNTER — APPOINTMENT (OUTPATIENT)
Dept: PULMONOLOGY | Facility: CLINIC | Age: 53
End: 2023-11-09
Payer: MEDICAID

## 2023-11-09 VITALS
SYSTOLIC BLOOD PRESSURE: 130 MMHG | HEART RATE: 115 BPM | WEIGHT: 100 LBS | BODY MASS INDEX: 18.88 KG/M2 | HEIGHT: 61 IN | OXYGEN SATURATION: 100 % | DIASTOLIC BLOOD PRESSURE: 108 MMHG

## 2023-11-09 VITALS — DIASTOLIC BLOOD PRESSURE: 99 MMHG | SYSTOLIC BLOOD PRESSURE: 157 MMHG

## 2023-11-09 DIAGNOSIS — J96.01 ACUTE RESPIRATORY FAILURE WITH HYPOXIA: ICD-10-CM

## 2023-11-09 PROCEDURE — ZZZZZ: CPT

## 2023-11-09 PROCEDURE — 99214 OFFICE O/P EST MOD 30 MIN: CPT

## 2023-11-11 ENCOUNTER — APPOINTMENT (OUTPATIENT)
Dept: CT IMAGING | Facility: CLINIC | Age: 53
End: 2023-11-11
Payer: MEDICAID

## 2023-11-11 ENCOUNTER — OUTPATIENT (OUTPATIENT)
Dept: OUTPATIENT SERVICES | Facility: HOSPITAL | Age: 53
LOS: 1 days | End: 2023-11-11
Payer: MEDICAID

## 2023-11-11 DIAGNOSIS — M06.9 RHEUMATOID ARTHRITIS, UNSPECIFIED: ICD-10-CM

## 2023-11-11 PROCEDURE — 71250 CT THORAX DX C-: CPT

## 2023-11-11 PROCEDURE — 71250 CT THORAX DX C-: CPT | Mod: 26

## 2023-11-21 ENCOUNTER — APPOINTMENT (OUTPATIENT)
Dept: PULMONOLOGY | Facility: CLINIC | Age: 53
End: 2023-11-21
Payer: MEDICAID

## 2023-11-21 PROCEDURE — 94729 DIFFUSING CAPACITY: CPT

## 2023-11-21 PROCEDURE — 94618 PULMONARY STRESS TESTING: CPT

## 2023-11-21 PROCEDURE — 94060 EVALUATION OF WHEEZING: CPT

## 2023-11-21 PROCEDURE — 94726 PLETHYSMOGRAPHY LUNG VOLUMES: CPT

## 2023-11-21 PROCEDURE — ZZZZZ: CPT

## 2023-11-22 LAB
CULTURE RESULTS: SIGNIFICANT CHANGE UP
NIGHT BLUE STAIN TISS: SIGNIFICANT CHANGE UP
SPECIMEN SOURCE: SIGNIFICANT CHANGE UP

## 2023-11-27 ENCOUNTER — RX RENEWAL (OUTPATIENT)
Age: 53
End: 2023-11-27

## 2023-11-29 LAB
CULTURE RESULTS: SIGNIFICANT CHANGE UP
CULTURE RESULTS: SIGNIFICANT CHANGE UP
NIGHT BLUE STAIN TISS: SIGNIFICANT CHANGE UP
NIGHT BLUE STAIN TISS: SIGNIFICANT CHANGE UP
SPECIMEN SOURCE: SIGNIFICANT CHANGE UP
SPECIMEN SOURCE: SIGNIFICANT CHANGE UP

## 2023-11-30 ENCOUNTER — APPOINTMENT (OUTPATIENT)
Dept: RHEUMATOLOGY | Facility: CLINIC | Age: 53
End: 2023-11-30
Payer: MEDICAID

## 2023-11-30 VITALS
OXYGEN SATURATION: 95 % | DIASTOLIC BLOOD PRESSURE: 90 MMHG | HEART RATE: 111 BPM | TEMPERATURE: 98 F | SYSTOLIC BLOOD PRESSURE: 145 MMHG

## 2023-11-30 DIAGNOSIS — Z78.9 OTHER SPECIFIED HEALTH STATUS: ICD-10-CM

## 2023-11-30 PROCEDURE — 99215 OFFICE O/P EST HI 40 MIN: CPT

## 2023-11-30 RX ORDER — PANTOPRAZOLE SODIUM 20 MG/1
TABLET, DELAYED RELEASE ORAL
Refills: 0 | Status: ACTIVE | COMMUNITY

## 2023-11-30 RX ORDER — PREDNISONE 5 MG/1
5 TABLET ORAL
Qty: 30 | Refills: 1 | Status: ACTIVE | COMMUNITY
Start: 2023-11-30

## 2023-11-30 RX ORDER — PREDNISONE 10 MG/1
10 TABLET ORAL
Qty: 30 | Refills: 1 | Status: COMPLETED | COMMUNITY
Start: 2023-10-19 | End: 2023-11-30

## 2023-11-30 RX ORDER — DILTIAZEM HCL 5 MG/ML
5 SYRINGE (ML) INTRAVENOUS
Refills: 0 | Status: ACTIVE | COMMUNITY

## 2023-11-30 RX ORDER — PNV NO.95/FERROUS FUM/FOLIC AC 28MG-0.8MG
500-3.125 TABLET ORAL
Refills: 0 | Status: ACTIVE | COMMUNITY
Start: 2023-11-30

## 2023-11-30 RX ORDER — SULFAMETHOXAZOLE AND TRIMETHOPRIM 800; 160 MG/1; MG/1
800-160 TABLET ORAL
Qty: 30 | Refills: 0 | Status: COMPLETED | COMMUNITY
Start: 2023-10-19 | End: 2023-11-30

## 2023-12-01 LAB
ALBUMIN SERPL ELPH-MCNC: 4.4 G/DL
ALP BLD-CCNC: 76 U/L
ALT SERPL-CCNC: 42 U/L
ANION GAP SERPL CALC-SCNC: 14 MMOL/L
AST SERPL-CCNC: 30 U/L
BILIRUB SERPL-MCNC: 0.5 MG/DL
BUN SERPL-MCNC: 9 MG/DL
CALCIUM SERPL-MCNC: 10.2 MG/DL
CHLORIDE SERPL-SCNC: 99 MMOL/L
CK SERPL-CCNC: 51 U/L
CO2 SERPL-SCNC: 24 MMOL/L
CREAT SERPL-MCNC: 0.68 MG/DL
CRP SERPL-MCNC: 4 MG/L
EGFR: 103 ML/MIN/1.73M2
ERYTHROCYTE [SEDIMENTATION RATE] IN BLOOD BY WESTERGREN METHOD: 28 MM/HR
GLUCOSE SERPL-MCNC: 91 MG/DL
HCT VFR BLD CALC: 40.9 %
HGB BLD-MCNC: 12.2 G/DL
MCHC RBC-ENTMCNC: 28.1 PG
MCHC RBC-ENTMCNC: 29.8 GM/DL
MCV RBC AUTO: 94.2 FL
MYOGLOBIN SERPL-MCNC: 21 NG/ML
PLATELET # BLD AUTO: 210 K/UL
POTASSIUM SERPL-SCNC: 4.1 MMOL/L
PROT SERPL-MCNC: 7.1 G/DL
RBC # BLD: 4.34 M/UL
RBC # FLD: 15.7 %
SODIUM SERPL-SCNC: 137 MMOL/L
WBC # FLD AUTO: 9.32 K/UL

## 2023-12-04 LAB — ALDOLASE SERPL-CCNC: 11.9 U/L

## 2023-12-08 NOTE — SWALLOW BEDSIDE ASSESSMENT ADULT - COMMENTS
----- Message from Arcadio Miguel MD sent at 12/7/2023  1:48 PM EST -----    I thought about her overnight -- I guess my concern is that she could have triple vessel disease, hence causing the normal stress. Would she be a good candidate for CABG if that were the case? If she is amenable to a cath, I think it's reasonable. augustina       CXR 10/7/2023 COMPARISON: 10/6/2023 FINDINGS: Heart/Vascular: The heart size, mediastinum, hilum and aorta are within normal limits for projection. Pulmonary: Midline trachea. There are again seen large confluent airspace opacities and consolidations greater in the upper lobes. There is no pneumothorax. Bones: There is no fracture. Lines and catheter: ET tube upper trachea. Tip of left IJ catheter in SVC. NG tube below diaphragm. Tip beyond field-of-view.   Impression: No significant change in large confluent airspace opacities and consolidations greater in the upper lobes.

## 2023-12-11 ENCOUNTER — APPOINTMENT (OUTPATIENT)
Dept: RADIOLOGY | Facility: IMAGING CENTER | Age: 53
End: 2023-12-11
Payer: MEDICAID

## 2023-12-11 ENCOUNTER — OUTPATIENT (OUTPATIENT)
Dept: OUTPATIENT SERVICES | Facility: HOSPITAL | Age: 53
LOS: 1 days | End: 2023-12-11
Payer: MEDICAID

## 2023-12-11 DIAGNOSIS — M06.9 RHEUMATOID ARTHRITIS, UNSPECIFIED: ICD-10-CM

## 2023-12-11 PROCEDURE — 73130 X-RAY EXAM OF HAND: CPT

## 2023-12-11 PROCEDURE — 73130 X-RAY EXAM OF HAND: CPT | Mod: 26,LT,RT

## 2023-12-11 PROCEDURE — 73620 X-RAY EXAM OF FOOT: CPT | Mod: 26,LT,RT

## 2023-12-11 PROCEDURE — 73620 X-RAY EXAM OF FOOT: CPT

## 2023-12-27 ENCOUNTER — APPOINTMENT (OUTPATIENT)
Dept: RHEUMATOLOGY | Facility: CLINIC | Age: 53
End: 2023-12-27
Payer: MEDICAID

## 2023-12-27 VITALS
OXYGEN SATURATION: 96 % | HEIGHT: 61 IN | DIASTOLIC BLOOD PRESSURE: 64 MMHG | HEART RATE: 86 BPM | WEIGHT: 100 LBS | BODY MASS INDEX: 18.88 KG/M2 | SYSTOLIC BLOOD PRESSURE: 110 MMHG

## 2023-12-27 DIAGNOSIS — R76.8 OTHER SPECIFIED ABNORMAL IMMUNOLOGICAL FINDINGS IN SERUM: ICD-10-CM

## 2023-12-27 PROCEDURE — 99214 OFFICE O/P EST MOD 30 MIN: CPT

## 2023-12-27 NOTE — ASSESSMENT
[FreeTextEntry1] : 53 year old female with reported hx of RA (though untreated and asymptomatic since 2009) was recently admitted for acute respiratory failure and diagnosed w/ likely ILD.  w/u also revealed (+)antisynthetase AB's.  Pt also w/ pulmonary hypertension.   - While there is no current indication for DMARD's for RA, will discuss w/ pulm re: need to treat for ILD.   - Awaiting repeat TTE now and CT chest, per pulm.   - Cont to monitor off prednisone for now.  If symptoms begin to recur, will need to restart it.

## 2023-12-27 NOTE — HISTORY OF PRESENT ILLNESS
[FreeTextEntry1] : Feeling fine since last visit.  She successfully tapered off prednisone.  No recurrence of dyspnea  No cough.  No joint pain/swelling.  No current complaints. [Anorexia] : no anorexia [Weight Loss] : no weight loss [Malaise] : no malaise [Fever] : no fever [Chills] : no chills [Fatigue] : no fatigue [Malar Facial Rash] : no malar facial rash [Skin Lesions] : no lesions [Skin Nodules] : no skin nodules [Oral Ulcers] : no oral ulcers [Cough] : no cough [Dry Mouth] : no dry mouth [Dysphonia] : no dysphonia [Dysphagia] : no dysphagia [Shortness of Breath] : no shortness of breath [Chest Pain] : no chest pain [Arthralgias] : no arthralgias [Joint Swelling] : no joint swelling [Joint Warmth] : no joint warmth [Joint Deformity] : no joint deformity [Decreased ROM] : no decreased range of motion [Morning Stiffness] : no morning stiffness [Falls] : no falls [Difficulty Standing] : no difficulty standing [Difficulty Walking] : no difficulty walking [Dyspnea] : no dyspnea [Myalgias] : no myalgias [Muscle Weakness] : no muscle weakness [Muscle Spasms] : no muscle spasms [Muscle Cramping] : no muscle cramping [Visual Changes] : no visual changes [Eye Pain] : no eye pain [Eye Redness] : no eye redness [Dry Eyes] : no dry eyes

## 2023-12-27 NOTE — PHYSICAL EXAM
[General Appearance - Alert] : alert [General Appearance - In No Acute Distress] : in no acute distress [Sclera] : the sclera and conjunctiva were normal [Outer Ear] : the ears and nose were normal in appearance [Oropharynx] : the oropharynx was normal [Neck Appearance] : the appearance of the neck was normal [Neck Cervical Mass (___cm)] : no neck mass was observed [Jugular Venous Distention Increased] : there was no jugular-venous distention [Thyroid Diffuse Enlargement] : the thyroid was not enlarged [Thyroid Nodule] : there were no palpable thyroid nodules [Auscultation Breath Sounds / Voice Sounds] : lungs were clear to auscultation bilaterally [Heart Rate And Rhythm] : heart rate was normal and rhythm regular [Heart Sounds] : normal S1 and S2 [Heart Sounds Gallop] : no gallops [Murmurs] : no murmurs [Heart Sounds Pericardial Friction Rub] : no pericardial rub [Edema] : there was no peripheral edema [Bowel Sounds] : normal bowel sounds [Abdomen Soft] : soft [Abdomen Tenderness] : non-tender [Abdomen Mass (___ Cm)] : no abdominal mass palpated [Cervical Lymph Nodes Enlarged Posterior Bilaterally] : posterior cervical [Supraclavicular Lymph Nodes Enlarged Bilaterally] : supraclavicular [Cervical Lymph Nodes Enlarged Anterior Bilaterally] : anterior cervical [No Spinal Tenderness] : no spinal tenderness [Skin Color & Pigmentation] : normal skin color and pigmentation [Skin Turgor] : normal skin turgor [] : no rash [No Focal Deficits] : no focal deficits [Oriented To Time, Place, And Person] : oriented to person, place, and time [Impaired Insight] : insight and judgment were intact [Affect] : the affect was normal [FreeTextEntry1] : strength 5/5 in all 4 extremities - proximal and distal

## 2023-12-27 NOTE — DATA REVIEWED
[FreeTextEntry1] : (+)LORA 1:640 (+)PL-7, weak positive MI-2, (+)NXP-2, (+)SSA 52KD dsDNA< Sm, RNP, SSA/SSB all negative (+)RF, (+)CCP ESR 74 /  mg/L ANCA negative C3/C4 166/30  (+)TPO AB 3

## 2024-01-12 ENCOUNTER — APPOINTMENT (OUTPATIENT)
Dept: PULMONOLOGY | Facility: CLINIC | Age: 54
End: 2024-01-12
Payer: MEDICAID

## 2024-01-12 VITALS
OXYGEN SATURATION: 98 % | DIASTOLIC BLOOD PRESSURE: 94 MMHG | RESPIRATION RATE: 16 BRPM | HEART RATE: 90 BPM | BODY MASS INDEX: 19.69 KG/M2 | SYSTOLIC BLOOD PRESSURE: 171 MMHG | WEIGHT: 107 LBS | TEMPERATURE: 98.1 F | HEIGHT: 62 IN

## 2024-01-12 VITALS
TEMPERATURE: 98.1 F | OXYGEN SATURATION: 98 % | WEIGHT: 107 LBS | HEART RATE: 91 BPM | SYSTOLIC BLOOD PRESSURE: 161 MMHG | HEIGHT: 62 IN | BODY MASS INDEX: 19.69 KG/M2 | DIASTOLIC BLOOD PRESSURE: 102 MMHG | RESPIRATION RATE: 16 BRPM

## 2024-01-12 PROCEDURE — 99214 OFFICE O/P EST MOD 30 MIN: CPT

## 2024-01-12 NOTE — HISTORY OF PRESENT ILLNESS
[TextBox_4] : 53-year-old woman who stated she has no past medical history presents for follow up. She was first seen after  hospitalization at [facility] in October 2023 for acute hypoxic respiratory failure requiring intubation and ICU stay.  At baseline patient lives at home and is fully independent. She works as a hair braider/stylist.  She states she had been feeling well and that prior to hospital admission she had had a cough for 3 to 4 days. She had significant shortness of breath on the day of admission so presented to the hospital. Upon presentation she was found to be hypoxic to 69% on room air. Labs are significant for leukocytosis to 16 with a lactate of 3 and proBNP of 344. CT chest was negative for pulmonary embolus but showed diffuse groundglass opacities throughout lung fields with sparing of anterior segment of the right upper lobe. There is also traction bronchiectasis. Repeat CT chest later in the hospitalization showed interval evolution and decrease of diffuse groundglass opacities with consolidation in the left upper lobe.  During her hospitalization she desaturated to SPO2 of 70s with increased work of breathing requiring intubation and mechanical ventilation. In the ICU she had significant dyssynchrony and elevated airway pressures requiring neuromuscular blockade and proning. She met criteria for ARDS. She was treated with Zosyn and high-dose steroids and was able to be extubated on October 9.  Of note patient states she has no medical history however hospital records indicate she has a history of rheumatoid arthritis with prior tuberculosis.  Her presentation was concerning for possible acute interstitial pneumonia vs bacterial pneumonia superimposed on ILD/ILD flare. She had profound hypoxia and inflammation with significant response to steroids. She has never smoked. She is from [facility] no has been in the [place] for over 25 years.Never smoker.  Since our last visit she has felt well and has no complaints. Dyspnea resolved.  She has not required any oxygen.  SHe is off prednisone. She was able to ambulate to 5 hours in [place] last weekend without any dyspnea.  She is not taking any medications at this time.  She does states she has a chronic cough which is dry.  Does not report a prior infection or pneumonia prior to October hospitalization.

## 2024-01-12 NOTE — ASSESSMENT
[FreeTextEntry1] : 53-year-old woman presents to [facility]/ acute hypoxic respiratory failure requiring intubation, proning and meeting criteria for ARDS. THough patient stated she had no know medical history, chart indicates she has a history of rheumatoid arthritis. CT chest was significant for bilateral diffuse groundglass opacities with interlobular thickening and traction bronchiectasis concerning for interstitial lung disease. Her presentation was concerning for possible AIP versus pneumonia superimposed on ILD with ILD flare vs . She was treated with broad-spectrum antibiotics-Zosyn and ceftriaxone. She was also treated with high-dose steroids. She has significant response to treatment. She is now feeling much improved and almost back to baseline. She does have supplemental oxygen she is now using only intermittently with exertion.  Of note, records from outside institution noted that she had 2 full months of ripe therapy for tuberculosis in 2009. Class IV Tb, no further tx deemed necessary.  TTE during hospitalization showed enlarged RV, reduced RVSF, PASP 64, consistent with severe pulmonary htn. No signs of R sided HF currently.    -   Now off oxygen PFTs in November showed restrictive ventilatory impairment and severely decreased DLCO.  Initially with normal FEV1/FVC ratio however decreased FEV1/FVC of 65 after albuterol.  Will repeat PFTs   Awaiting repeat TTE  November CT chest showed significant improvement in diffuse groundglass opacities.  She continues to have areas of biapical bronchiectasis and scarring left greater than right.  Will obtain repeat CT chest.  At this time she is clinically feeling very well with no complaints.  Will await repeat PFTs and CT imaging before determining need for treatment at this time.   I spent 38minutes during this encounter. Total time excludes separate billing services.

## 2024-02-06 ENCOUNTER — APPOINTMENT (OUTPATIENT)
Dept: CT IMAGING | Facility: IMAGING CENTER | Age: 54
End: 2024-02-06

## 2024-02-20 ENCOUNTER — APPOINTMENT (OUTPATIENT)
Dept: CT IMAGING | Facility: IMAGING CENTER | Age: 54
End: 2024-02-20
Payer: MEDICAID

## 2024-02-20 ENCOUNTER — OUTPATIENT (OUTPATIENT)
Dept: OUTPATIENT SERVICES | Facility: HOSPITAL | Age: 54
LOS: 1 days | End: 2024-02-20
Payer: COMMERCIAL

## 2024-02-20 DIAGNOSIS — J84.9 INTERSTITIAL PULMONARY DISEASE, UNSPECIFIED: ICD-10-CM

## 2024-02-20 PROCEDURE — 71250 CT THORAX DX C-: CPT | Mod: 26

## 2024-02-20 PROCEDURE — 71250 CT THORAX DX C-: CPT

## 2024-03-13 ENCOUNTER — APPOINTMENT (OUTPATIENT)
Dept: PULMONOLOGY | Facility: CLINIC | Age: 54
End: 2024-03-13
Payer: COMMERCIAL

## 2024-03-13 PROCEDURE — 94729 DIFFUSING CAPACITY: CPT

## 2024-03-13 PROCEDURE — 94010 BREATHING CAPACITY TEST: CPT

## 2024-03-13 PROCEDURE — 94726 PLETHYSMOGRAPHY LUNG VOLUMES: CPT

## 2024-03-15 ENCOUNTER — OUTPATIENT (OUTPATIENT)
Dept: OUTPATIENT SERVICES | Facility: HOSPITAL | Age: 54
LOS: 1 days | End: 2024-03-15
Payer: COMMERCIAL

## 2024-03-15 ENCOUNTER — APPOINTMENT (OUTPATIENT)
Dept: CT IMAGING | Facility: IMAGING CENTER | Age: 54
End: 2024-03-15
Payer: COMMERCIAL

## 2024-03-15 DIAGNOSIS — J38.01 PARALYSIS OF VOCAL CORDS AND LARYNX, UNILATERAL: ICD-10-CM

## 2024-03-15 PROCEDURE — 71260 CT THORAX DX C+: CPT

## 2024-03-15 PROCEDURE — 71260 CT THORAX DX C+: CPT | Mod: 26

## 2024-03-15 PROCEDURE — 70491 CT SOFT TISSUE NECK W/DYE: CPT | Mod: 26

## 2024-03-15 PROCEDURE — 70491 CT SOFT TISSUE NECK W/DYE: CPT

## 2024-03-28 ENCOUNTER — APPOINTMENT (OUTPATIENT)
Dept: PULMONOLOGY | Facility: CLINIC | Age: 54
End: 2024-03-28
Payer: COMMERCIAL

## 2024-03-28 VITALS
WEIGHT: 108 LBS | TEMPERATURE: 98 F | RESPIRATION RATE: 15 BRPM | SYSTOLIC BLOOD PRESSURE: 128 MMHG | HEIGHT: 62 IN | BODY MASS INDEX: 19.88 KG/M2 | HEART RATE: 109 BPM | OXYGEN SATURATION: 98 % | DIASTOLIC BLOOD PRESSURE: 86 MMHG

## 2024-03-28 DIAGNOSIS — M06.9 RHEUMATOID ARTHRITIS, UNSPECIFIED: ICD-10-CM

## 2024-03-28 DIAGNOSIS — R91.8 OTHER NONSPECIFIC ABNORMAL FINDING OF LUNG FIELD: ICD-10-CM

## 2024-03-28 DIAGNOSIS — J84.9 INTERSTITIAL PULMONARY DISEASE, UNSPECIFIED: ICD-10-CM

## 2024-03-28 PROCEDURE — 99214 OFFICE O/P EST MOD 30 MIN: CPT

## 2024-03-28 NOTE — HISTORY OF PRESENT ILLNESS
[TextBox_4] : 53-year-old woman who stated she has no past medical history presents for follow up.   Pt initially seen after hospitalization at [facility] in October 2023 for acute hypoxic respiratory failure requiring intubation and ICU stay.  At baseline patient lives at home and is fully independent. She works as a hair braider/stylist.  She states she had been feeling well and that prior to hospital admission she had had a cough for 3 to 4 days. She had significant shortness of breath on the day of admission so presented to the hospital. Upon presentation she was found to be hypoxic to 69% on room air. Labs are significant for leukocytosis to 16 with a lactate of 3 and proBNP of 344. CT chest was negative for pulmonary embolus but showed diffuse groundglass opacities throughout lung fields with sparing of anterior segment of the right upper lobe. There is also traction bronchiectasis. Repeat CT chest later in the hospitalization showed interval evolution and decrease of diffuse groundglass opacities with consolidation in the left upper lobe.  During her hospitalization she desaturated to SPO2 of 70s with increased work of breathing requiring intubation and mechanical ventilation. In the ICU she had significant dyssynchrony and elevated airway pressures requiring neuromuscular blockade and proning. She met criteria for ARDS. She was treated with Zosyn and high-dose steroids and was able to be extubated on October 9.  Of note patient states she has no medical history however hospital records indicate she has a history of rheumatoid arthritis with prior tuberculosis.  Her presentation was concerning for possible acute interstitial pneumonia vs bacterial pneumonia superimposed on ILD/ILD flare. She had profound hypoxia and inflammation with significant response to steroids. She has never smoked. She is from [facility] no has been in the [place] for over 25 years.Never smoker.  Since discharge she has felt well and has no complaints. No significant cough, dyspnea resolved.  She has not required any oxygen for months now.  No new complaints.  She states in the past she is to have joint pain.  She has no joint pain or joint swelling at this time.  She was seen by rheumatology - no treatment offered at this time.

## 2024-03-28 NOTE — ASSESSMENT
[FreeTextEntry1] : 53-year-old woman presents to [facility] w/ acute hypoxic respiratory failure requiring intubation, proning and meeting criteria for ARDS. Though patient stated she had no know medical history, chart indicates she has a history of rheumatoid arthritis. CT chest was significant for bilateral diffuse groundglass opacities with interlobular thickening and traction bronchiectasis concerning for interstitial lung disease. Her presentation was concerning for possible AIP versus pneumonia superimposed on ILD with ILD flare vs . She was treated with broad-spectrum antibiotics-Zosyn and ceftriaxone. She was also treated with high-dose steroids. She has significant response to treatment. She is now feeling much improved and almost back to baseline. She does have supplemental oxygen she is now using only intermittently with exertion.  Of note, records from outside institution noted that she had 2 full months of ripe therapy for tuberculosis in 2009. Class IV Tb, no further tx deemed necessary.  TTE during hospitalization showed enlarged RV, reduced RVSF, PASP 64, consistent with severe pulmonary htn. No signs of R sided HF currently. Will repeat TTE   -Follow-up with rheumatology   - repeat TTE - not yet obtained. severe pulmonary htn on prior study - will repeat now that pt clinically improved - CT chest without change  - PFT with severe obstructive and restrictive impairment. Reduced DLCO though no longer requiring oxygen, pt asymptomatic   I spent 39 minutes during this encounter. Total time excludes separate billing services.

## 2024-03-28 NOTE — PHYSICAL EXAM
[No Acute Distress] : no acute distress [Normal Appearance] : normal appearance [Normal Oropharynx] : normal oropharynx [No Neck Mass] : no neck mass [Normal Rate/Rhythm] : normal rate/rhythm [Normal S1, S2] : normal s1, s2 [No Murmurs] : no murmurs [No Resp Distress] : no resp distress [Clear to Auscultation Bilaterally] : clear to auscultation bilaterally [No Abnormalities] : no abnormalities [Benign] : benign [Normal Gait] : normal gait [No Cyanosis] : no cyanosis [No Clubbing] : no clubbing [No Edema] : no edema [FROM] : FROM [Normal Color/ Pigmentation] : normal color/ pigmentation [No Focal Deficits] : no focal deficits [Oriented x3] : oriented x3 [Normal Affect] : normal affect

## 2024-05-16 NOTE — PROGRESS NOTE ADULT - SUBJECTIVE AND OBJECTIVE BOX
INTERVAL HPI/OVERNIGHT EVENTS:  No new complaints.    MEDICATIONS  (STANDING):  calcium carbonate 1250 mG  + Vitamin D (OsCal 500 + D) 1 Tablet(s) Oral two times a day  chlorhexidine 2% Cloths 1 Application(s) Topical <User Schedule>  diltiazem    Tablet 30 milliGRAM(s) Oral every 8 hours  enoxaparin Injectable 40 milliGRAM(s) SubCutaneous every 24 hours  pantoprazole    Tablet 40 milliGRAM(s) Oral before breakfast  predniSONE   Tablet 50 milliGRAM(s) Oral daily    MEDICATIONS  (PRN):  acetaminophen     Tablet .. 650 milliGRAM(s) Oral every 6 hours PRN Temp greater or equal to 38C (100.4F), Mild Pain (1 - 3), Moderate Pain (4 - 6)  albuterol/ipratropium for Nebulization 3 milliLiter(s) Nebulizer every 6 hours PRN Shortness of Breath and/or Wheezing  ondansetron Injectable 4 milliGRAM(s) IV Push every 8 hours PRN Nausea and/or Vomiting      Allergies    No Known Drug Allergies  Tomatoes (Urticaria)  strawberries, tomatoes, chocolate; reaction of cough, itchy throat (Other)        Vital Signs Last 24 Hrs  T(C): 37.1 (15 Oct 2023 10:33), Max: 37.6 (14 Oct 2023 23:54)  T(F): 98.8 (15 Oct 2023 10:33), Max: 99.6 (14 Oct 2023 23:54)  HR: 127 (15 Oct 2023 10:33) (74 - 136)  BP: 127/85 (15 Oct 2023 10:33) (116/79 - 136/87)  BP(mean): 103 (15 Oct 2023 04:39) (89 - 103)  RR: 20 (15 Oct 2023 10:33) (18 - 20)  SpO2: 98% (15 Oct 2023 10:33) (81% - 98%)    Parameters below as of 15 Oct 2023 10:01  Patient On (Oxygen Delivery Method): nasal cannula  O2 Flow (L/min): 2      PHYSICAL EXAM:  General :  NAD  HEENT--  no oral ulcers  Nodes--  no LAD  Lungs--  CTA B/L  Heart--  RRR, nlS1 &S2 normal;   Abdomen--  soft, NT, ND +BS  Skin:  no rashes  Musculoskeletal exam:  No synovitis;  normal ROM in all joints        LABS:    Thyroperoxidase Antibody (10.13.23 @ 07:20)    Thyroperoxidase Antibody: 39.6 IU/mL    [place] Antibody Screening Test (10.13.23 @ 07:20)    SM (Angel) Ab FBIA: <0.2 AI   Anti-Ribonuclear Protein: 0.2: Fluorescent Bead Immunoassy                      Reference Ranges for RNP and SM:                      <1.0 AI (negative)                      > or =1.0 AI (positive)                      Reference values apply to all ages AI    Sjogren&#x27;s Syndrome Antibodies (10.13.23 @ 07:20)    Anti SS-A Antibody: <0.2 AI   Anti SS-B Antibody: <0.2: Fluorescent Bead Immunoassay   Reference Ranges for SS-A AND SS-B:   <1.0 AI (negative)   > or =1.0 AI (positive)   Reference values apply  to all ages. AI        RADIOLOGY & ADDITIONAL TESTS:   Female

## 2024-05-17 DIAGNOSIS — I27.20 PULMONARY HYPERTENSION, UNSPECIFIED: ICD-10-CM

## 2024-05-20 ENCOUNTER — OUTPATIENT (OUTPATIENT)
Dept: OUTPATIENT SERVICES | Facility: HOSPITAL | Age: 54
LOS: 1 days | End: 2024-05-20
Payer: COMMERCIAL

## 2024-05-20 ENCOUNTER — APPOINTMENT (OUTPATIENT)
Dept: CV DIAGNOSITCS | Facility: HOSPITAL | Age: 54
End: 2024-05-20

## 2024-05-20 ENCOUNTER — RESULT REVIEW (OUTPATIENT)
Age: 54
End: 2024-05-20

## 2024-05-20 DIAGNOSIS — I27.20 PULMONARY HYPERTENSION, UNSPECIFIED: ICD-10-CM

## 2024-05-20 PROCEDURE — 76376 3D RENDER W/INTRP POSTPROCES: CPT | Mod: 26

## 2024-05-20 PROCEDURE — 93306 TTE W/DOPPLER COMPLETE: CPT | Mod: 26

## 2024-07-30 NOTE — PHYSICAL THERAPY INITIAL EVALUATION ADULT - RISK REDUCTION/PREVENTION, PT EVAL
Pt was seen in the [facility].  Pt is in usual state , no complaint  General appearance: Comfortable, no distress  ROS: No SOB  Medications reviewed  Head: Normal  Neck: Soft  Heart: Regular  Lungs: Clear  Abdomen: soft    Plan:   1)clinically doing fine  2) To continue lipitor 40 mg daily    Problem List Items Addressed This Visit       Hyperlipidemia - Primary         risk factors no
